# Patient Record
Sex: FEMALE | Race: WHITE | NOT HISPANIC OR LATINO | Employment: FULL TIME | URBAN - METROPOLITAN AREA
[De-identification: names, ages, dates, MRNs, and addresses within clinical notes are randomized per-mention and may not be internally consistent; named-entity substitution may affect disease eponyms.]

---

## 2017-10-31 ENCOUNTER — OFFICE VISIT (OUTPATIENT)
Dept: SLEEP MEDICINE | Facility: HOSPITAL | Age: 57
End: 2017-10-31
Attending: INTERNAL MEDICINE

## 2017-10-31 ENCOUNTER — APPOINTMENT (OUTPATIENT)
Dept: SLEEP MEDICINE | Facility: HOSPITAL | Age: 57
End: 2017-10-31
Attending: INTERNAL MEDICINE

## 2017-10-31 VITALS
SYSTOLIC BLOOD PRESSURE: 123 MMHG | WEIGHT: 215 LBS | BODY MASS INDEX: 32.58 KG/M2 | DIASTOLIC BLOOD PRESSURE: 77 MMHG | HEIGHT: 68 IN | HEART RATE: 76 BPM

## 2017-10-31 DIAGNOSIS — E11.9 TYPE 2 DIABETES MELLITUS WITHOUT COMPLICATION, UNSPECIFIED LONG TERM INSULIN USE STATUS: ICD-10-CM

## 2017-10-31 DIAGNOSIS — K21.9 GASTROESOPHAGEAL REFLUX DISEASE WITHOUT ESOPHAGITIS: ICD-10-CM

## 2017-10-31 DIAGNOSIS — G47.33 OBSTRUCTIVE SLEEP APNEA, ADULT: Primary | ICD-10-CM

## 2017-10-31 PROCEDURE — G0463 HOSPITAL OUTPT CLINIC VISIT: HCPCS

## 2017-10-31 NOTE — PROGRESS NOTES
PULMONARY SLEEP CONSULT NOTE      PATIENT IDENTIFICATION:  Name: Magda Nath  Age: 56 y.o.  Sex: female  :  1960  MRN: [id number]    DATE OF CONSULTATION:  10/31/2017   Referring Physician: No admitting provider for patient encounter.                  CHIEF COMPLAINT: Obstructive Sleep Apnea    History of Present Illness:   Magda Nath is a 56 y.o. female Pt with still multiple wakening up at night with sleepiness fatigue and snoring, witnessed apnea, Hard  to get up in the morning. Daytime fatigue sleepiness loss of energy, Redrock score of (16 )       Review of Systems:   Constitutional: negative   Eyes: negative   ENT/oropharynx: negative   Cardiovascular: negative   Respiratory: negative   Gastrointestinal: negative   Genitourinary: negative   Neurological: negative   Musculoskeletal: negative   Integument/breast: negative   Endocrine: negative   Allergic/Immunologic: negative     Past Medical History:  No past medical history on file.  DM  GERD  Anxiety  Past Surgical History:  No past surgical history on file.     Family History:  No family history on file.     Social History:   Social History   Substance Use Topics   • Smoking status: Not on file   • Smokeless tobacco: Not on file   • Alcohol use Not on file    current smoker     Allergies:  Allergies not on file    Home Meds:    (Not in a hospital admission)    Objective:    Vitals Ranges:   Heart Rate:  [76] 76  BP: (123)/(77) 123/77  Body mass index is 32.69 kg/(m^2).         Exam:    General Appearance:      Head:  Normocephalic, without obvious abnormality, atraumatic.   Eyes:  Conjunctiva/corneas clear, EOM's intact, both eyes.         Ears:  Normal external ear canals, both ears.    Nose:  Nares normal, no drainage      Throat:  Lips, mucosa, and tongue normal. Mallampati score 3    Neck:  Supple, symmetrical, trachea midline. No JVD.  Lungs:   Bilateral basal rhonchi bilaterally, respirations unlabored symmetrical wall movement.     Chest wall:  No tenderness or deformity.    Heart:  Regular rate and rhythm, S1 and S2 normal.  Abdomen: Soft, non-tender, no masses, no organomegaly.    Extremities: Trace edema no clubbing or Cyanosis        Data Review:  All labs (24hrs): No results found for this or any previous visit (from the past 24 hour(s)).     Imaging:  [unfilled]    ASSESSMENT:  Diagnoses and all orders for this visit:    Obstructive sleep apnea, adult    Type 2 diabetes mellitus without complication, unspecified long term insulin use status    Gastroesophageal reflux disease without esophagitis        PLAN:   This is patient with symptoms of obstructive sleep apnea, NPSG study ASAP / split night study, Avoid supine avoid sedative meds in pm, weight loss, Avoid driving. Long discussion with patient about the physiology of YAYO, and long term and short term benefit of treating YAYO   Treating YAYO will improve BP control           Robbi Shi MD. D, ABSM.  10/31/2017  11:54 AM

## 2017-11-03 ENCOUNTER — TRANSCRIBE ORDERS (OUTPATIENT)
Dept: PULMONOLOGY | Facility: HOSPITAL | Age: 57
End: 2017-11-03

## 2017-11-03 ENCOUNTER — TRANSCRIBE ORDERS (OUTPATIENT)
Dept: SLEEP MEDICINE | Facility: HOSPITAL | Age: 57
End: 2017-11-03

## 2017-11-03 DIAGNOSIS — G47.33 OSA (OBSTRUCTIVE SLEEP APNEA): Primary | ICD-10-CM

## 2017-12-17 ENCOUNTER — HOSPITAL ENCOUNTER (OUTPATIENT)
Dept: SLEEP MEDICINE | Facility: HOSPITAL | Age: 57
Discharge: HOME | End: 2017-12-17
Admitting: INTERNAL MEDICINE

## 2017-12-17 DIAGNOSIS — G47.33 OSA (OBSTRUCTIVE SLEEP APNEA): ICD-10-CM

## 2017-12-17 PROCEDURE — 95810 POLYSOM 6/> YRS 4/> PARAM: CPT

## 2018-01-02 ENCOUNTER — TELEPHONE (OUTPATIENT)
Dept: SLEEP MEDICINE | Facility: HOSPITAL | Age: 58
End: 2018-01-02

## 2018-01-02 NOTE — TELEPHONE ENCOUNTER
Tech spoke w/ pt regarding detailed SS results. Pt understands and is agreeable to scheduling titration study per MD. PAP titration scheduled for 2/18/18.

## 2018-02-09 ENCOUNTER — TELEPHONE (OUTPATIENT)
Dept: SLEEP MEDICINE | Facility: HOSPITAL | Age: 58
End: 2018-02-09

## 2018-05-29 ENCOUNTER — APPOINTMENT (OUTPATIENT)
Dept: SLEEP MEDICINE | Facility: HOSPITAL | Age: 58
End: 2018-05-29
Attending: INTERNAL MEDICINE

## 2018-06-12 ENCOUNTER — APPOINTMENT (OUTPATIENT)
Dept: SLEEP MEDICINE | Facility: HOSPITAL | Age: 58
End: 2018-06-12
Attending: INTERNAL MEDICINE

## 2023-03-22 ENCOUNTER — TRANSCRIBE ORDERS (OUTPATIENT)
Dept: ADMINISTRATIVE | Facility: HOSPITAL | Age: 63
End: 2023-03-22
Payer: COMMERCIAL

## 2023-03-22 ENCOUNTER — HOSPITAL ENCOUNTER (OUTPATIENT)
Dept: CARDIOLOGY | Facility: HOSPITAL | Age: 63
Discharge: HOME | End: 2023-03-22
Payer: COMMERCIAL

## 2023-03-22 ENCOUNTER — LAB (OUTPATIENT)
Dept: LAB | Facility: HOSPITAL | Age: 63
End: 2023-03-22
Payer: COMMERCIAL

## 2023-03-22 DIAGNOSIS — Z01.812 PRE-OPERATIVE LABORATORY EXAMINATION: ICD-10-CM

## 2023-03-22 DIAGNOSIS — Z01.812 PRE-OPERATIVE LABORATORY EXAMINATION: Primary | ICD-10-CM

## 2023-03-22 LAB
ANION GAP SERPL CALCULATED.3IONS-SCNC: 7 MMOL/L (ref 5–15)
BUN SERPL-MCNC: 10 MG/DL (ref 8–23)
BUN/CREAT SERPL: 16.9 (ref 7–25)
CALCIUM SPEC-SCNC: 8.9 MG/DL (ref 8.6–10.5)
CHLORIDE SERPL-SCNC: 104 MMOL/L (ref 98–107)
CO2 SERPL-SCNC: 29 MMOL/L (ref 22–29)
CREAT SERPL-MCNC: 0.59 MG/DL (ref 0.57–1)
DEPRECATED RDW RBC AUTO: 40.9 FL (ref 37–54)
EGFRCR SERPLBLD CKD-EPI 2021: 102.7 ML/MIN/1.73
EOSINOPHIL # BLD MANUAL: 0.18 10*3/MM3 (ref 0–0.4)
EOSINOPHIL NFR BLD MANUAL: 2 % (ref 0.3–6.2)
ERYTHROCYTE [DISTWIDTH] IN BLOOD BY AUTOMATED COUNT: 12.5 % (ref 12.3–15.4)
GLUCOSE SERPL-MCNC: 120 MG/DL (ref 65–99)
HBA1C MFR BLD: 8.4 % (ref 4.8–5.6)
HCT VFR BLD AUTO: 52.5 % (ref 34–46.6)
HGB BLD-MCNC: 17.3 G/DL (ref 12–15.9)
LYMPHOCYTES # BLD MANUAL: 2.85 10*3/MM3 (ref 0.7–3.1)
LYMPHOCYTES NFR BLD MANUAL: 6.1 % (ref 5–12)
MCH RBC QN AUTO: 29.5 PG (ref 26.6–33)
MCHC RBC AUTO-ENTMCNC: 33 G/DL (ref 31.5–35.7)
MCV RBC AUTO: 89.6 FL (ref 79–97)
MONOCYTES # BLD: 0.54 10*3/MM3 (ref 0.1–0.9)
NEUTROPHILS # BLD AUTO: 5.26 10*3/MM3 (ref 1.7–7)
NEUTROPHILS NFR BLD MANUAL: 59.6 % (ref 42.7–76)
PLAT MORPH BLD: NORMAL
PLATELET # BLD AUTO: 296 10*3/MM3 (ref 140–450)
PMV BLD AUTO: 10.1 FL (ref 6–12)
POTASSIUM SERPL-SCNC: 4.6 MMOL/L (ref 3.5–5.2)
QT INTERVAL: 416 MS
RBC # BLD AUTO: 5.86 10*6/MM3 (ref 3.77–5.28)
RBC MORPH BLD: NORMAL
SODIUM SERPL-SCNC: 140 MMOL/L (ref 136–145)
VARIANT LYMPHS NFR BLD MANUAL: 32.3 % (ref 19.6–45.3)
WBC MORPH BLD: NORMAL
WBC NRBC COR # BLD: 8.83 10*3/MM3 (ref 3.4–10.8)

## 2023-03-22 PROCEDURE — 83036 HEMOGLOBIN GLYCOSYLATED A1C: CPT

## 2023-03-22 PROCEDURE — 80048 BASIC METABOLIC PNL TOTAL CA: CPT

## 2023-03-22 PROCEDURE — 93005 ELECTROCARDIOGRAM TRACING: CPT | Performed by: ORTHOPAEDIC SURGERY

## 2023-03-22 PROCEDURE — 85007 BL SMEAR W/DIFF WBC COUNT: CPT

## 2023-03-22 PROCEDURE — 36415 COLL VENOUS BLD VENIPUNCTURE: CPT

## 2023-03-22 PROCEDURE — 93010 ELECTROCARDIOGRAM REPORT: CPT | Performed by: INTERNAL MEDICINE

## 2023-03-22 PROCEDURE — 85027 COMPLETE CBC AUTOMATED: CPT

## 2023-10-01 ENCOUNTER — HOSPITAL ENCOUNTER (INPATIENT)
Facility: HOSPITAL | Age: 63
LOS: 4 days | Discharge: HOME | DRG: 190 | End: 2023-10-05
Attending: EMERGENCY MEDICINE | Admitting: FAMILY MEDICINE
Payer: COMMERCIAL

## 2023-10-01 ENCOUNTER — APPOINTMENT (OUTPATIENT)
Dept: CT IMAGING | Facility: HOSPITAL | Age: 63
DRG: 190 | End: 2023-10-01
Payer: COMMERCIAL

## 2023-10-01 ENCOUNTER — APPOINTMENT (OUTPATIENT)
Dept: GENERAL RADIOLOGY | Facility: HOSPITAL | Age: 63
DRG: 190 | End: 2023-10-01
Payer: COMMERCIAL

## 2023-10-01 DIAGNOSIS — J44.1 COPD WITH ACUTE EXACERBATION: Primary | ICD-10-CM

## 2023-10-01 DIAGNOSIS — J96.01 ACUTE HYPOXIC RESPIRATORY FAILURE: ICD-10-CM

## 2023-10-01 DIAGNOSIS — J44.1 COPD EXACERBATION: ICD-10-CM

## 2023-10-01 LAB
ALBUMIN SERPL-MCNC: 3.7 G/DL (ref 3.5–5.2)
ALBUMIN/GLOB SERPL: 1.5 G/DL
ALP SERPL-CCNC: 93 U/L (ref 39–117)
ALT SERPL W P-5'-P-CCNC: 12 U/L (ref 1–33)
ANION GAP SERPL CALCULATED.3IONS-SCNC: 11.5 MMOL/L (ref 5–15)
AST SERPL-CCNC: 12 U/L (ref 1–32)
BASOPHILS # BLD AUTO: 0.06 10*3/MM3 (ref 0–0.2)
BASOPHILS NFR BLD AUTO: 0.7 % (ref 0–1.5)
BILIRUB SERPL-MCNC: 0.5 MG/DL (ref 0–1.2)
BUN SERPL-MCNC: 15 MG/DL (ref 8–23)
BUN/CREAT SERPL: 27.3 (ref 7–25)
CALCIUM SPEC-SCNC: 8.6 MG/DL (ref 8.6–10.5)
CHLORIDE SERPL-SCNC: 101 MMOL/L (ref 98–107)
CO2 SERPL-SCNC: 26.5 MMOL/L (ref 22–29)
CREAT SERPL-MCNC: 0.55 MG/DL (ref 0.57–1)
D DIMER PPP FEU-MCNC: 0.73 MCGFEU/ML (ref 0–0.61)
DEPRECATED RDW RBC AUTO: 48.2 FL (ref 37–54)
EGFRCR SERPLBLD CKD-EPI 2021: 104.4 ML/MIN/1.73
EOSINOPHIL # BLD AUTO: 0.31 10*3/MM3 (ref 0–0.4)
EOSINOPHIL NFR BLD AUTO: 3.6 % (ref 0.3–6.2)
ERYTHROCYTE [DISTWIDTH] IN BLOOD BY AUTOMATED COUNT: 14.6 % (ref 12.3–15.4)
GLOBULIN UR ELPH-MCNC: 2.5 GM/DL
GLUCOSE BLDC GLUCOMTR-MCNC: 247 MG/DL (ref 70–130)
GLUCOSE BLDC GLUCOMTR-MCNC: 337 MG/DL (ref 70–130)
GLUCOSE BLDC GLUCOMTR-MCNC: 340 MG/DL (ref 70–130)
GLUCOSE SERPL-MCNC: 203 MG/DL (ref 65–99)
HCT VFR BLD AUTO: 49.9 % (ref 34–46.6)
HGB BLD-MCNC: 15.8 G/DL (ref 12–15.9)
IMM GRANULOCYTES # BLD AUTO: 0.04 10*3/MM3 (ref 0–0.05)
IMM GRANULOCYTES NFR BLD AUTO: 0.5 % (ref 0–0.5)
LYMPHOCYTES # BLD AUTO: 2.16 10*3/MM3 (ref 0.7–3.1)
LYMPHOCYTES NFR BLD AUTO: 25 % (ref 19.6–45.3)
MCH RBC QN AUTO: 28.5 PG (ref 26.6–33)
MCHC RBC AUTO-ENTMCNC: 31.7 G/DL (ref 31.5–35.7)
MCV RBC AUTO: 89.9 FL (ref 79–97)
MONOCYTES # BLD AUTO: 0.97 10*3/MM3 (ref 0.1–0.9)
MONOCYTES NFR BLD AUTO: 11.2 % (ref 5–12)
NEUTROPHILS NFR BLD AUTO: 5.1 10*3/MM3 (ref 1.7–7)
NEUTROPHILS NFR BLD AUTO: 59 % (ref 42.7–76)
NRBC BLD AUTO-RTO: 0 /100 WBC (ref 0–0.2)
PLATELET # BLD AUTO: 293 10*3/MM3 (ref 140–450)
PMV BLD AUTO: 10 FL (ref 6–12)
POTASSIUM SERPL-SCNC: 4.2 MMOL/L (ref 3.5–5.2)
PROT SERPL-MCNC: 6.2 G/DL (ref 6–8.5)
RBC # BLD AUTO: 5.55 10*6/MM3 (ref 3.77–5.28)
SODIUM SERPL-SCNC: 139 MMOL/L (ref 136–145)
WBC NRBC COR # BLD: 8.64 10*3/MM3 (ref 3.4–10.8)

## 2023-10-01 PROCEDURE — 36415 COLL VENOUS BLD VENIPUNCTURE: CPT

## 2023-10-01 PROCEDURE — 99285 EMERGENCY DEPT VISIT HI MDM: CPT

## 2023-10-01 PROCEDURE — 85025 COMPLETE CBC W/AUTO DIFF WBC: CPT | Performed by: EMERGENCY MEDICINE

## 2023-10-01 PROCEDURE — 94799 UNLISTED PULMONARY SVC/PX: CPT

## 2023-10-01 PROCEDURE — 82948 REAGENT STRIP/BLOOD GLUCOSE: CPT

## 2023-10-01 PROCEDURE — 80053 COMPREHEN METABOLIC PANEL: CPT | Performed by: EMERGENCY MEDICINE

## 2023-10-01 PROCEDURE — 94761 N-INVAS EAR/PLS OXIMETRY MLT: CPT

## 2023-10-01 PROCEDURE — 71275 CT ANGIOGRAPHY CHEST: CPT

## 2023-10-01 PROCEDURE — 94640 AIRWAY INHALATION TREATMENT: CPT

## 2023-10-01 PROCEDURE — 85379 FIBRIN DEGRADATION QUANT: CPT | Performed by: EMERGENCY MEDICINE

## 2023-10-01 PROCEDURE — 25010000002 METHYLPREDNISOLONE PER 125 MG: Performed by: EMERGENCY MEDICINE

## 2023-10-01 PROCEDURE — 25010000002 METHYLPREDNISOLONE PER 40 MG: Performed by: FAMILY MEDICINE

## 2023-10-01 PROCEDURE — 25510000001 IOPAMIDOL PER 1 ML: Performed by: EMERGENCY MEDICINE

## 2023-10-01 PROCEDURE — 63710000001 INSULIN ASPART PER 5 UNITS: Performed by: FAMILY MEDICINE

## 2023-10-01 PROCEDURE — 71045 X-RAY EXAM CHEST 1 VIEW: CPT

## 2023-10-01 PROCEDURE — 63710000001 INSULIN DETEMIR PER 5 UNITS: Performed by: FAMILY MEDICINE

## 2023-10-01 RX ORDER — CYCLOBENZAPRINE HCL 10 MG
10 TABLET ORAL NIGHTLY PRN
COMMUNITY

## 2023-10-01 RX ORDER — FUROSEMIDE 20 MG/1
20 TABLET ORAL DAILY PRN
COMMUNITY

## 2023-10-01 RX ORDER — DEXTROSE MONOHYDRATE 25 G/50ML
10-50 INJECTION, SOLUTION INTRAVENOUS
Status: DISCONTINUED | OUTPATIENT
Start: 2023-10-01 | End: 2023-10-01 | Stop reason: SDUPTHER

## 2023-10-01 RX ORDER — MULTIVIT-MIN/IRON/FOLIC ACID/K 18-600-40
500 CAPSULE ORAL DAILY
COMMUNITY

## 2023-10-01 RX ORDER — ASPIRIN 81 MG/1
81 TABLET ORAL DAILY
COMMUNITY

## 2023-10-01 RX ORDER — VITAMIN B COMPLEX
CAPSULE ORAL DAILY
COMMUNITY

## 2023-10-01 RX ORDER — INSULIN ASPART 100 [IU]/ML
3-14 INJECTION, SOLUTION INTRAVENOUS; SUBCUTANEOUS
Status: DISCONTINUED | OUTPATIENT
Start: 2023-10-01 | End: 2023-10-05 | Stop reason: HOSPADM

## 2023-10-01 RX ORDER — ALBUTEROL SULFATE 2.5 MG/3ML
2.5 SOLUTION RESPIRATORY (INHALATION)
Status: COMPLETED | OUTPATIENT
Start: 2023-10-01 | End: 2023-10-01

## 2023-10-01 RX ORDER — SODIUM CHLORIDE 0.9 % (FLUSH) 0.9 %
10 SYRINGE (ML) INJECTION AS NEEDED
Status: DISCONTINUED | OUTPATIENT
Start: 2023-10-01 | End: 2023-10-05 | Stop reason: HOSPADM

## 2023-10-01 RX ORDER — BENZONATATE 100 MG/1
200 CAPSULE ORAL 3 TIMES DAILY PRN
Status: DISCONTINUED | OUTPATIENT
Start: 2023-10-01 | End: 2023-10-05 | Stop reason: HOSPADM

## 2023-10-01 RX ORDER — GUAIFENESIN 600 MG/1
600 TABLET, EXTENDED RELEASE ORAL EVERY 12 HOURS SCHEDULED
Status: DISCONTINUED | OUTPATIENT
Start: 2023-10-01 | End: 2023-10-05 | Stop reason: HOSPADM

## 2023-10-01 RX ORDER — NICOTINE POLACRILEX 4 MG
15 LOZENGE BUCCAL
Status: DISCONTINUED | OUTPATIENT
Start: 2023-10-01 | End: 2023-10-05 | Stop reason: HOSPADM

## 2023-10-01 RX ORDER — MELATONIN
1000 DAILY
COMMUNITY

## 2023-10-01 RX ORDER — NICOTINE POLACRILEX 4 MG
15 LOZENGE BUCCAL
Status: DISCONTINUED | OUTPATIENT
Start: 2023-10-01 | End: 2023-10-01 | Stop reason: SDUPTHER

## 2023-10-01 RX ORDER — MELOXICAM 15 MG/1
15 TABLET ORAL DAILY
COMMUNITY

## 2023-10-01 RX ORDER — IPRATROPIUM BROMIDE AND ALBUTEROL SULFATE 2.5; .5 MG/3ML; MG/3ML
3 SOLUTION RESPIRATORY (INHALATION) ONCE
Status: COMPLETED | OUTPATIENT
Start: 2023-10-01 | End: 2023-10-01

## 2023-10-01 RX ORDER — ATORVASTATIN CALCIUM 40 MG/1
40 TABLET, FILM COATED ORAL DAILY
COMMUNITY

## 2023-10-01 RX ORDER — ALBUTEROL SULFATE 2.5 MG/3ML
2.5 SOLUTION RESPIRATORY (INHALATION) EVERY 4 HOURS PRN
Status: DISCONTINUED | OUTPATIENT
Start: 2023-10-01 | End: 2023-10-05 | Stop reason: HOSPADM

## 2023-10-01 RX ORDER — LISINOPRIL 20 MG/1
20 TABLET ORAL
Status: DISCONTINUED | OUTPATIENT
Start: 2023-10-01 | End: 2023-10-05 | Stop reason: HOSPADM

## 2023-10-01 RX ORDER — PLECANATIDE 3 MG/1
3 TABLET ORAL DAILY PRN
COMMUNITY

## 2023-10-01 RX ORDER — MELOXICAM 7.5 MG/1
15 TABLET ORAL DAILY
Status: DISCONTINUED | OUTPATIENT
Start: 2023-10-01 | End: 2023-10-05 | Stop reason: HOSPADM

## 2023-10-01 RX ORDER — IPRATROPIUM BROMIDE AND ALBUTEROL SULFATE 2.5; .5 MG/3ML; MG/3ML
3 SOLUTION RESPIRATORY (INHALATION) EVERY 4 HOURS PRN
COMMUNITY

## 2023-10-01 RX ORDER — ESTRADIOL 2 MG/1
2 TABLET ORAL DAILY
COMMUNITY

## 2023-10-01 RX ORDER — LISINOPRIL 10 MG/1
10 TABLET ORAL DAILY
COMMUNITY

## 2023-10-01 RX ORDER — ZOLPIDEM TARTRATE 10 MG/1
10 TABLET ORAL NIGHTLY PRN
COMMUNITY

## 2023-10-01 RX ORDER — DEXTROSE MONOHYDRATE 25 G/50ML
25 INJECTION, SOLUTION INTRAVENOUS
Status: DISCONTINUED | OUTPATIENT
Start: 2023-10-01 | End: 2023-10-05 | Stop reason: HOSPADM

## 2023-10-01 RX ORDER — METHYLPREDNISOLONE SODIUM SUCCINATE 40 MG/ML
40 INJECTION, POWDER, LYOPHILIZED, FOR SOLUTION INTRAMUSCULAR; INTRAVENOUS EVERY 8 HOURS
Status: DISCONTINUED | OUTPATIENT
Start: 2023-10-01 | End: 2023-10-02

## 2023-10-01 RX ORDER — IPRATROPIUM BROMIDE AND ALBUTEROL SULFATE 2.5; .5 MG/3ML; MG/3ML
3 SOLUTION RESPIRATORY (INHALATION)
Status: DISCONTINUED | OUTPATIENT
Start: 2023-10-01 | End: 2023-10-02

## 2023-10-01 RX ORDER — ATORVASTATIN CALCIUM 40 MG/1
40 TABLET, FILM COATED ORAL NIGHTLY
Status: DISCONTINUED | OUTPATIENT
Start: 2023-10-01 | End: 2023-10-02

## 2023-10-01 RX ORDER — METHYLPREDNISOLONE SODIUM SUCCINATE 125 MG/2ML
125 INJECTION, POWDER, LYOPHILIZED, FOR SOLUTION INTRAMUSCULAR; INTRAVENOUS ONCE
Status: COMPLETED | OUTPATIENT
Start: 2023-10-01 | End: 2023-10-01

## 2023-10-01 RX ORDER — LEVOCETIRIZINE DIHYDROCHLORIDE 5 MG/1
5 TABLET, FILM COATED ORAL EVERY EVENING
COMMUNITY

## 2023-10-01 RX ORDER — FERROUS SULFATE 325(65) MG
325 TABLET ORAL 2 TIMES DAILY WITH MEALS
COMMUNITY

## 2023-10-01 RX ORDER — BUSPIRONE HYDROCHLORIDE 5 MG/1
5 TABLET ORAL 3 TIMES DAILY PRN
COMMUNITY

## 2023-10-01 RX ORDER — ENOXAPARIN SODIUM 100 MG/ML
40 INJECTION SUBCUTANEOUS EVERY 24 HOURS
Status: DISCONTINUED | OUTPATIENT
Start: 2023-10-02 | End: 2023-10-05 | Stop reason: HOSPADM

## 2023-10-01 RX ORDER — INSULIN ASPART 100 [IU]/ML
3-14 INJECTION, SOLUTION INTRAVENOUS; SUBCUTANEOUS ONCE
Status: COMPLETED | OUTPATIENT
Start: 2023-10-01 | End: 2023-10-01

## 2023-10-01 RX ADMIN — IOPAMIDOL 100 ML: 755 INJECTION, SOLUTION INTRAVENOUS at 09:00

## 2023-10-01 RX ADMIN — METHYLPREDNISOLONE SODIUM SUCCINATE 40 MG: 40 INJECTION, POWDER, FOR SOLUTION INTRAMUSCULAR; INTRAVENOUS at 11:40

## 2023-10-01 RX ADMIN — INSULIN DETEMIR 20 UNITS: 100 INJECTION, SOLUTION SUBCUTANEOUS at 21:24

## 2023-10-01 RX ADMIN — GUAIFENESIN 600 MG: 600 TABLET, EXTENDED RELEASE ORAL at 11:40

## 2023-10-01 RX ADMIN — INSULIN ASPART 10 UNITS: 100 INJECTION, SOLUTION INTRAVENOUS; SUBCUTANEOUS at 21:25

## 2023-10-01 RX ADMIN — METHYLPREDNISOLONE SODIUM SUCCINATE 40 MG: 40 INJECTION, POWDER, FOR SOLUTION INTRAMUSCULAR; INTRAVENOUS at 18:45

## 2023-10-01 RX ADMIN — ALBUTEROL SULFATE 2.5 MG: 2.5 SOLUTION RESPIRATORY (INHALATION) at 07:37

## 2023-10-01 RX ADMIN — IPRATROPIUM BROMIDE AND ALBUTEROL SULFATE 3 ML: .5; 3 SOLUTION RESPIRATORY (INHALATION) at 19:19

## 2023-10-01 RX ADMIN — LISINOPRIL 20 MG: 20 TABLET ORAL at 13:39

## 2023-10-01 RX ADMIN — ALBUTEROL SULFATE 2.5 MG: 2.5 SOLUTION RESPIRATORY (INHALATION) at 07:17

## 2023-10-01 RX ADMIN — Medication 10 ML: at 20:09

## 2023-10-01 RX ADMIN — INSULIN ASPART 10 UNITS: 100 INJECTION, SOLUTION INTRAVENOUS; SUBCUTANEOUS at 17:42

## 2023-10-01 RX ADMIN — IPRATROPIUM BROMIDE AND ALBUTEROL SULFATE 3 ML: .5; 3 SOLUTION RESPIRATORY (INHALATION) at 07:11

## 2023-10-01 RX ADMIN — METHYLPREDNISOLONE SODIUM SUCCINATE 125 MG: 125 INJECTION, POWDER, FOR SOLUTION INTRAMUSCULAR; INTRAVENOUS at 07:18

## 2023-10-01 RX ADMIN — BENZONATATE 200 MG: 100 CAPSULE ORAL at 20:09

## 2023-10-01 RX ADMIN — MELOXICAM 15 MG: 7.5 TABLET ORAL at 13:39

## 2023-10-01 RX ADMIN — INSULIN ASPART 5 UNITS: 100 INJECTION, SOLUTION INTRAVENOUS; SUBCUTANEOUS at 13:38

## 2023-10-01 RX ADMIN — IPRATROPIUM BROMIDE AND ALBUTEROL SULFATE 3 ML: .5; 3 SOLUTION RESPIRATORY (INHALATION) at 14:20

## 2023-10-01 RX ADMIN — ATORVASTATIN CALCIUM 40 MG: 40 TABLET, FILM COATED ORAL at 20:09

## 2023-10-01 RX ADMIN — INSULIN DETEMIR 60 UNITS: 100 INJECTION, SOLUTION SUBCUTANEOUS at 13:38

## 2023-10-01 RX ADMIN — GUAIFENESIN 600 MG: 600 TABLET, EXTENDED RELEASE ORAL at 20:09

## 2023-10-01 NOTE — Clinical Note
Level of Care: Telemetry [5]   Diagnosis: COPD exacerbation [942936]   Admitting Physician: MICHAELLE CALDERON [[id number]]   Bed Request Comments: COPD exacerbation with COVID 8 days ago.

## 2023-10-01 NOTE — PLAN OF CARE
Goal Outcome Evaluation:  Plan of Care Reviewed With: patient           Outcome Evaluation: Vss, Admitted from er. O2 at 2 liters. Receiving iv solumedrol. Placed on telemetry.

## 2023-10-01 NOTE — H&P
"[facility] HOSPITALIST     PCP: Provider, No Known    CODE status: Full     CHIEF COMPLAINT: SOB; low oxygen sats    HISTORY OF PRESENT ILLNESS:    63 y/o female with hx of DM II, hyperlipidemia, and recently diagnosed HTN, as well as tobacco abuse and likely COPD, presents to [facility] ED for persistent SOB.  Patient was diagnosed with COVID-19 about 10 days ago.  She was seen in immediate care and prescribed oral steroids, breathing treatments, Paxlovid, Symbicort, and doxycycline.  She started to feel a little better, and returned to work 5 days later.  Over the last few days, she has had more wheezing and a more productive cough with \"white foamy sputum.\"  She feels her lungs and chest getting tight, especially with exertion.  She has a pulse ox at home and has been noticing her sats drop to the 70s at night and mid to low 80s during the day.  It improves to high 80s after a breathing treatment, then drops back to low 80s.  She had fevers for the first few days of her illness, but none since.  She continues to smoke a few cigarettes before and after work.        Past Medical History:   Diagnosis Date    Diabetes mellitus      Past Surgical History:   Procedure Laterality Date    JOINT REPLACEMENT       History reviewed. No pertinent family history.  Social History     Tobacco Use    Smoking status: Every Day     Types: Cigarettes   Substance Use Topics    Alcohol use: Never    Drug use: Never     (Not in a hospital admission)    Allergies:  Macrobid [nitrofurantoin] and Erythromycin    Immunization History   Administered Date(s) Administered    COVID-19 (MODERNA) 1st,2nd,3rd Dose Monovalent 02/25/2021, 03/25/2021, 12/04/2021       REVIEW OF SYSTEMS:  Please see the above history of present illness for pertinent positives and negatives.  The remainder of the patient's systems have been reviewed and are negative.      Vital Signs  Temp:  [98.4 °F (36.9 °C)] 98.4 °F (36.9 °C)  Heart Rate:  " [90-99] 92  Resp:  [20-22] 20  BP: (153)/(81) 153/81         Physical Exam:  General: sitting up in chair; on oxygen; increased work of breathing  HENT: Head is atraumatic, normocephalic. Hearing is grossly intact  Eyes: Vision is grossly intact. Pupils appear equal and round.   Cardiovascular: RRR, S1-S2; no murmurs  Respiratory: Diffuse bilateral wheezing with diminished breath sounds at bases  Abdominal/GI: Soft, nontender, bowel sounds present. No rebound or guarding present.   Extremities: No peripheral edema noted.   Musculoskeletal: 5/5 MS Ues and Les   Skin: Warm and dry.   Psych: Mood and affect are appropriate. Cooperative with exam.   Neuro: No facial asymmetry noted. No focal deficits noted, hearing and vision are grossly intact.    Emotional Behavior: all of the following were found to be normal   Judgment and Insight   Mental Status   Memory   Mood and Affect: neither of the following found acutely        Depression                 Anxiety     Debilities: no signs of the following found    Physical Weakness     Handicaps     Disabilities     Agitation      Results Review:    I reviewed the patient's new clinical results.  Lab Results (most recent)       Procedure Component Value Units Date/Time    Comprehensive Metabolic Panel [[id number]]  (Abnormal) Collected: 10/01/23 0720    Specimen: Blood from Arm, Right Updated: 10/01/23 0744     Glucose 203 mg/dL      BUN 15 mg/dL      Creatinine 0.55 mg/dL      Sodium 139 mmol/L      Potassium 4.2 mmol/L      Chloride 101 mmol/L      CO2 26.5 mmol/L      Calcium 8.6 mg/dL      Total Protein 6.2 g/dL      Albumin 3.7 g/dL      ALT (SGPT) 12 U/L      AST (SGOT) 12 U/L      Alkaline Phosphatase 93 U/L      Total Bilirubin 0.5 mg/dL      Globulin 2.5 gm/dL      A/G Ratio 1.5 g/dL      BUN/Creatinine Ratio 27.3     Anion Gap 11.5 mmol/L      eGFR 104.4 mL/min/1.73     Narrative:      GFR Normal >60  Chronic Kidney Disease <60  Kidney Failure <15      D-dimer,  "Quantitative [[id number]]  (Abnormal) Collected: 10/01/23 0720    Specimen: Blood from Arm, Right Updated: 10/01/23 0740     D-Dimer, Quantitative 0.73 MCGFEU/mL     Narrative:      According to the assay 's published package insert, a normal (<0.50 MCGFEU/mL) D-dimer result in conjunction with a non-high clinical probability assessment, excludes deep vein thrombosis (DVT) and pulmonary embolism (PE) with high sensitivity.    D-dimer values increase with age and this can make VTE exclusion of an older population difficult. To address this, the [employer], based on best available evidence and recent guidelines, recommends that clinicians use age-adjusted D-dimer thresholds in patients greater than 50 years of age with: a) a low probability of PE who do not meet all Pulmonary Embolism Rule Out Criteria, or b) in those with intermediate probability of PE.   The formula for an age-adjusted D-dimer cut-off is \"age/100\".  For example, a 60 year old patient would have an age-adjusted cut-off of 0.60 MCGFEU/mL and an 80 year old 0.80 MCGFEU/mL.    CBC & Differential [[id number]]  (Abnormal) Collected: 10/01/23 0720    Specimen: Blood from Arm, Right Updated: 10/01/23 0727    Narrative:      The following orders were created for panel order CBC & Differential.  Procedure                               Abnormality         Status                     ---------                               -----------         ------                     CBC Auto Differential[[id number]]        Abnormal            Final result                 Please view results for these tests on the individual orders.    CBC Auto Differential [[id number]]  (Abnormal) Collected: 10/01/23 0720    Specimen: Blood from Arm, Right Updated: 10/01/23 0727     WBC 8.64 10*3/mm3      RBC 5.55 10*6/mm3      Hemoglobin 15.8 g/dL      Hematocrit 49.9 %      MCV 89.9 fL      MCH 28.5 pg      MCHC 31.7 g/dL      RDW 14.6 %      RDW-SD 48.2 fl      " MPV 10.0 fL      Platelets 293 10*3/mm3      Neutrophil % 59.0 %      Lymphocyte % 25.0 %      Monocyte % 11.2 %      Eosinophil % 3.6 %      Basophil % 0.7 %      Immature Grans % 0.5 %      Neutrophils, Absolute 5.10 10*3/mm3      Lymphocytes, Absolute 2.16 10*3/mm3      Monocytes, Absolute 0.97 10*3/mm3      Eosinophils, Absolute 0.31 10*3/mm3      Basophils, Absolute 0.06 10*3/mm3      Immature Grans, Absolute 0.04 10*3/mm3      nRBC 0.0 /100 WBC             Imaging Results (Most Recent)       Procedure Component Value Units Date/Time    CT Angiogram Chest [[id number]] Collected: 10/01/23 0919     Updated: 10/01/23 0926    Narrative:      CT ANGIOGRAM CHEST-10/1/2023     INDICATION:  Worsening shortness of air and cough. COVID diagnosis 8 days ago.     TECHNIQUE:  CT angiogram of the chest with IV contrast. 3-D reconstructions were  obtained and reviewed.   Radiation dose reduction techniques included  automated exposure control or exposure modulation based on body size.  Count of known CT and cardiac nuc med studies performed in previous 12  months: 0.     COMPARISON:  None available.     FINDINGS:  Adequate opacification of the pulmonary arteries with no filling  defects. Thoracic aorta normal in course and caliber without dissection.  Heart size is normal. No pericardial effusion.     No pleural effusion. No pneumothorax. No focal pulmonary infiltrates. 5  mm noncalcified pulmonary nodule in the left lower lobe (image 87).     Visualized upper abdomen is unremarkable.     No acute osseous abnormality.       Impression:      1. Negative for pulmonary embolus.  2. Negative for thoracic aortic aneurysm/dissection.  3. No acute pulmonary process.  4. 5 mm noncalcified pulmonary nodule in the left lower lobe. Management  recommendation: Based on current published guidelines, uncomplicated  pulmonary nodules less than 6 mm do not require CT follow-up. In high  risk patients, follow-up CT in 12 months is optional.  ([employer] guidelines, 2017).     This report was finalized on 10/1/2023 9:24 AM by Dr. Kevin Mendez MD.       XR Chest 1 View [[id number]] Collected: 10/01/23 0805     Updated: 10/01/23 0808    Narrative:      XR CHEST 1 VW-, 10/1/2023     HISTORY:  Shortness of breath. COVID diagnosis 1 week ago.     COMPARISON:  *  None available     FINDINGS:  Single frontal view(s) of the chest. The lungs are clear. No pleural  effusions. No pneumothorax. Heart size and mediastinal contours are  within normal limits. Pulmonary vasculature is unremarkable. No acute  osseous abnormalities.          Impression:      No acute cardiopulmonary findings.     This report was finalized on 10/1/2023 8:05 AM by Dr. Kevin Mendez MD.                 ECG/EMG Results (most recent)       None          reviewed    Assessment & Plan     Acute hypoxic respiratory failure  Secondary to acute COPD exacerbation likely triggered by COVID-19  I personally reviewed CXR and CTA chest showing no acute findings or infiltrates  Failed outpatient therapy with oral steroids, inhalers, and doxycycline  Admit and start IV steroids q 8 hours with scheduled and prn Duonebs  Mucinex and tessalon prn  IS to bedside; wean oxygen as tolerated  Considered Azithromycin, but patient with allergy to erythromycin years ago, so hold off for now     Pulmonary nodule  Noted on CTA chest  Needs outpatient follow-up in 6-12 months     DM II uncontrolled with hyperglycemia  On basaglar 60 U daily at home  Continue this daily and add 20 U dose nightly while on steroids  Accuchecks with SSI  Check hgb A1c in AM     HTN  Recently diagnosed following surgery   Continue lisinopril daily     Hyperlipidemia  Continue statin    Chronic back pain   Continue daily mobic    DVT ppx: Lovenox/ambulation           Chronic stable conditions to be managed with home medications include the following conditions listed below. Also please note: Every effort was made to accurately  obtain patient's home medications. This was done utilizing extensive chart review, ED documentation, recent pharmacy records, and where possible thorough discussion with the patient if medications were known by the patient. I have reviewed and edited the patient's home medications as per my efforts above and current med list can be found within home medications portion of this electronic medical record and is accurate as possible as of 10/01/23             I discussed the patient's findings and my recommendations with patient at bedside.      Michael Sandoval, DO  10/01/23  10:37 EDT      At [facility], we believe that sharing information builds trust and better relationships. You are receiving this note because you recently visited [facility]. It is possible you will see health information before a provider has talked with you about it. This kind of information can be easy to misunderstand. To help you fully understand what it means for your health, we urge you to discuss this note with your provider.

## 2023-10-01 NOTE — ED PROVIDER NOTES
"Subjective     History provided by:  Patient  History of Present Illness    Chief complaint: Shortness of breath and cough.    Location: Lungs    Quality/Severity: The patient states she is severely short of breath.  She has a cough that is productive of foamy sputum.  She has had postnasal drip and runny nose.  She has been wheezing.    Timing/Onset: Started 8 days ago.    Modifying Factors: Patient was diagnosed with COVID 8 days ago.  She was treated initially with Paxlovid, Doxy, and steroids.  She is using her Symbicort and albuterol inhalers without improvement.    Associated symptoms: Denies chest pain or fever.    Narrative: The patient is a 62-year-old white female who developed a cough, postnasal drip and congestion and shortness of breath 8 days ago.  She was diagnosed with COVID 9/21/2023 and treated with Paxlovid, doxycycline and steroids.  Her symptoms persist.  She had a bad coughing paroxysm last night.  Her Symbicort and albuterol inhalers are not not helping.  She has a history of COPD and continues to smoke a pack per day.    PLAN:bs  /64 (BP Location: Left arm, Patient Position: Lying)   Pulse 95   Temp 98.2 °F (36.8 °C) (Oral)   Resp 20   Ht 177.8 cm (70\")   Wt 95.2 kg (209 lb 14.1 oz)   LMP  (Exact Date)   SpO2 (!) 89%   BMI 30.11 kg/m²   Review of Systems    .edla  Past Medical History:   Diagnosis Date    Diabetes mellitus        Allergies   Allergen Reactions    Macrobid [Nitrofurantoin] Headache    Erythromycin Rash       Past Surgical History:   Procedure Laterality Date    JOINT REPLACEMENT         History reviewed. No pertinent family history.    Social History     Socioeconomic History    Marital status:    Tobacco Use    Smoking status: Every Day     Packs/day: 1.00     Types: Cigarettes   Vaping Use    Vaping Use: Never used   Substance and Sexual Activity    Alcohol use: Never    Drug use: Never           Objective   Physical Exam  Vitals and nursing note " reviewed.   Constitutional:       General: She is not in acute distress.     Appearance: She is well-developed. She is not toxic-appearing or diaphoretic.      Comments: The patient appears mild respiratory distress.  She does not appear toxic.  Review of her vital signs: She is afebrile with a temperature 98.4, respirations are slightly elevated 20 with a room air oxygen saturation this diminished at 89%, slightly tachycardic with heart rate of 99, blood pressure slightly elevated 153/81.   HENT:      Head: Normocephalic and atraumatic.      Nose: Nose normal.      Mouth/Throat:      Mouth: Mucous membranes are moist.      Pharynx: Oropharynx is clear. No pharyngeal swelling or oropharyngeal exudate.   Eyes:      General: No scleral icterus.        Right eye: No discharge.         Left eye: No discharge.      Pupils: Pupils are equal, round, and reactive to light.   Neck:      Thyroid: No thyromegaly.      Vascular: No JVD.   Cardiovascular:      Rate and Rhythm: Normal rate and regular rhythm.      Heart sounds: Normal heart sounds. No murmur heard.  Pulmonary:      Effort: Pulmonary effort is normal.      Breath sounds: Normal breath sounds. Examination of the right-upper field reveals wheezing. Examination of the left-upper field reveals wheezing. Examination of the right-middle field reveals wheezing. Examination of the left-middle field reveals wheezing. Examination of the right-lower field reveals wheezing. Examination of the left-lower field reveals wheezing. Wheezing (Inspiratory and expiratory) present. No decreased breath sounds or rales.   Chest:      Chest wall: No tenderness.   Abdominal:      General: Bowel sounds are normal. There is no distension.      Palpations: Abdomen is soft.      Tenderness: There is no abdominal tenderness.   Musculoskeletal:         General: No tenderness or deformity. Normal range of motion.      Cervical back: Normal range of motion and neck supple.      Right lower leg:  No edema.      Left lower leg: No edema.   Lymphadenopathy:      Cervical: No cervical adenopathy.   Skin:     General: Skin is warm and dry.      Capillary Refill: Capillary refill takes less than 2 seconds.      Findings: No erythema or rash.   Neurological:      General: No focal deficit present.      Mental Status: She is alert and oriented to person, place, and time.      Cranial Nerves: No cranial nerve deficit.      Coordination: Coordination normal.      Comments: No focal motor sensory deficit   Psychiatric:         Mood and Affect: Mood normal.         Behavior: Behavior normal.         Thought Content: Thought content normal.         Judgment: Judgment normal.       Procedures           ED Course  ED Course as of 10/01/23 1545   Sun Oct 01, 2023   0932 Review the patient's laboratory studies: The patient's CMP has an elevated glucose of 203, otherwise normal electrolytes, normal renal function test and normal liver enzymes.  D-dimer slightly elevated 0.73.  CBC is normal white count 8.6 with a normal differential.  Hemoglobin, hematocrit are essentially within normal limits. [TP]   0933 The patient's chest x-ray was interpreted by me and the radiologist as no acute disease. [TP]   0933 CT angiogram was obtained due to the patient having low oxygen saturations, slightly elevated D-dimer, and recently having COVID.  The CT angiogram was negative for pulmonary embolus and negative for acute pulmonary process.  A 5 mm noncalcified left lower lobe pulmonary nodule was noted. [TP]   0934 The patient has wheezing and appears to be having a COPD exacerbation.  She was administered Solu-Medrol 125 mg IV.  She was administered a DuoNeb followed by 2 albuterol nebulizer treatments. [TP]   0934 Repeat examination at 09: 30 after above treatments the patient still has expiratory wheezes.  On a liter of oxygen nasal cannula the patient has an oxygen saturation of 90%.  I increased her O2 to 2 L. [TP]   0937 09:37  patient discussed with Dr. Sandoval, hospitalist, who agrees to admit the patient. [TP]      ED Course User Index  [TP] Juwan Munoz MD                                           Medical Decision Making  My differential diagnosis for dyspnea includes but is not limited to:  Asthma, COPD, pneumonia, pulmonary embolus, acute respiratory distress syndrome, pneumothorax, pleural effusion, pulmonary fibrosis, congestive heart failure, myocardial infarction, DKA, uremia, acidosis, sepsis, anemia, drug related, hyperventilation, CNS disease     Problems Addressed:  COPD with acute exacerbation: complicated acute illness or injury    Amount and/or Complexity of Data Reviewed  Labs: ordered. Decision-making details documented in ED Course.  Radiology: ordered and independent interpretation performed. Decision-making details documented in ED Course.  Discussion of management or test interpretation with external provider(s): Details documented in the ED course.    Risk  Prescription drug management.  Decision regarding hospitalization.      Labs Reviewed   COMPREHENSIVE METABOLIC PANEL - Abnormal; Notable for the following components:       Result Value    Glucose 203 (*)     Creatinine 0.55 (*)     BUN/Creatinine Ratio 27.3 (*)     All other components within normal limits    Narrative:     GFR Normal >60  Chronic Kidney Disease <60  Kidney Failure <15     D-DIMER, QUANTITATIVE - Abnormal; Notable for the following components:    D-Dimer, Quantitative 0.73 (*)     All other components within normal limits    Narrative:     According to the assay 's published package insert, a normal (<0.50 MCGFEU/mL) D-dimer result in conjunction with a non-high clinical probability assessment, excludes deep vein thrombosis (DVT) and pulmonary embolism (PE) with high sensitivity.    D-dimer values increase with age and this can make VTE exclusion of an older population difficult. To address this, the [employer] of  "Physicians, based on best available evidence and recent guidelines, recommends that clinicians use age-adjusted D-dimer thresholds in patients greater than 50 years of age with: a) a low probability of PE who do not meet all Pulmonary Embolism Rule Out Criteria, or b) in those with intermediate probability of PE.   The formula for an age-adjusted D-dimer cut-off is \"age/100\".  For example, a 60 year old patient would have an age-adjusted cut-off of 0.60 MCGFEU/mL and an 80 year old 0.80 MCGFEU/mL.   CBC WITH AUTO DIFFERENTIAL - Abnormal; Notable for the following components:    RBC 5.55 (*)     Hematocrit 49.9 (*)     Monocytes, Absolute 0.97 (*)     All other components within normal limits   POCT GLUCOSE FINGERSTICK - Abnormal; Notable for the following components:    Glucose 247 (*)     All other components within normal limits   POCT GLUCOSE FINGERSTICK   POCT GLUCOSE FINGERSTICK   POCT GLUCOSE FINGERSTICK   CBC AND DIFFERENTIAL    Narrative:     The following orders were created for panel order CBC & Differential.  Procedure                               Abnormality         Status                     ---------                               -----------         ------                     CBC Auto Differential[[id number]]        Abnormal            Final result                 Please view results for these tests on the individual orders.   Rishi fry       Medication List      No changes were made to your prescriptions during this visit.             Final diagnoses:   COPD with acute exacerbation       ED Disposition  ED Disposition       ED Disposition   Decision to Admit    Condition   --    Comment   Level of Care: Telemetry [5]   Diagnosis: Acute hypoxic respiratory failure [[id number]]   Admitting Physician: MICHAELLE CALDERON [[id number]]   Certification: I Certify That Inpatient Hospital Services Are Medically Necessary For Greater Than 2 Midnights                 No follow-up provider specified.       Medication List "      No changes were made to your prescriptions during this visit.            Juwan Munoz MD  10/01/23 9361

## 2023-10-01 NOTE — NURSING NOTE
..Nursing report ED to floor  Magda Nath  62 y.o.  female    HPI :   Chief Complaint   Patient presents with    Shortness of Breath       Admitting doctor:   Michael Sadnoval DO    Admitting diagnosis:   The encounter diagnosis was COPD with acute exacerbation.    Code status:   Current Code Status       Date Active Code Status Order ID Comments User Context       Not on file            Allergies:   Macrobid [nitrofurantoin] and Erythromycin    Isolation:   No active isolations    Intake and Output  No intake or output data in the 24 hours ending 10/01/23 1034    Weight:   There were no vitals filed for this visit.    Most recent vitals:   Vitals:    10/01/23 0745 10/01/23 0800 10/01/23 0900 10/01/23 1000   BP:       BP Location:       Patient Position:       Pulse: 95 92 91 92   Resp:       Temp:       TempSrc:       SpO2: (!) 89% 92% 93%        Active LDAs/IV Access:   Lines, Drains & Airways       Active LDAs       Name Placement date Placement time Site Days    Peripheral IV 10/01/23 0658 Right Antecubital 10/01/23  0658  Antecubital  less than 1                    Labs (abnormal labs have a star):   Labs Reviewed   COMPREHENSIVE METABOLIC PANEL - Abnormal; Notable for the following components:       Result Value    Glucose 203 (*)     Creatinine 0.55 (*)     BUN/Creatinine Ratio 27.3 (*)     All other components within normal limits    Narrative:     GFR Normal >60  Chronic Kidney Disease <60  Kidney Failure <15     D-DIMER, QUANTITATIVE - Abnormal; Notable for the following components:    D-Dimer, Quantitative 0.73 (*)     All other components within normal limits    Narrative:     According to the assay 's published package insert, a normal (<0.50 MCGFEU/mL) D-dimer result in conjunction with a non-high clinical probability assessment, excludes deep vein thrombosis (DVT) and pulmonary embolism (PE) with high sensitivity.    D-dimer values increase with age and this can make VTE exclusion of an  "older population difficult. To address this, the [employer], based on best available evidence and recent guidelines, recommends that clinicians use age-adjusted D-dimer thresholds in patients greater than 50 years of age with: a) a low probability of PE who do not meet all Pulmonary Embolism Rule Out Criteria, or b) in those with intermediate probability of PE.   The formula for an age-adjusted D-dimer cut-off is \"age/100\".  For example, a 60 year old patient would have an age-adjusted cut-off of 0.60 MCGFEU/mL and an 80 year old 0.80 MCGFEU/mL.   CBC WITH AUTO DIFFERENTIAL - Abnormal; Notable for the following components:    RBC 5.55 (*)     Hematocrit 49.9 (*)     Monocytes, Absolute 0.97 (*)     All other components within normal limits   CBC AND DIFFERENTIAL    Narrative:     The following orders were created for panel order CBC & Differential.  Procedure                               Abnormality         Status                     ---------                               -----------         ------                     CBC Auto Differential[[id number]]        Abnormal            Final result                 Please view results for these tests on the individual orders.       EKG:   No orders to display       Meds given in ED:   Medications   sodium chloride 0.9 % flush 10 mL (has no administration in time range)   albuterol (PROVENTIL) nebulizer solution 0.083% 2.5 mg/3mL (has no administration in time range)   ipratropium-albuterol (DUO-NEB) nebulizer solution 3 mL (has no administration in time range)   methylPREDNISolone sodium succinate (SOLU-Medrol) injection 40 mg (has no administration in time range)   benzonatate (TESSALON) capsule 200 mg (has no administration in time range)   guaiFENesin (MUCINEX) 12 hr tablet 600 mg (has no administration in time range)   methylPREDNISolone sodium succinate (SOLU-Medrol) injection 125 mg (125 mg Intravenous Given 10/1/23 0718)   ipratropium-albuterol " (DUO-NEB) nebulizer solution 3 mL (3 mL Nebulization Given 10/1/23 0711)   albuterol (PROVENTIL) nebulizer solution 0.083% 2.5 mg/3mL (2.5 mg Nebulization Given 10/1/23 0737)   iopamidol (ISOVUE-370) 76 % injection 100 mL (100 mL Intravenous Given 10/1/23 0900)       Imaging results:  XR Chest 1 View    Result Date: 10/1/2023  No acute cardiopulmonary findings.  This report was finalized on 10/1/2023 8:05 AM by Dr. Kevin Mendez MD.      CT Angiogram Chest    Result Date: 10/1/2023  1. Negative for pulmonary embolus. 2. Negative for thoracic aortic aneurysm/dissection. 3. No acute pulmonary process. 4. 5 mm noncalcified pulmonary nodule in the left lower lobe. Management recommendation: Based on current published guidelines, uncomplicated pulmonary nodules less than 6 mm do not require CT follow-up. In high risk patients, follow-up CT in 12 months is optional. (Fleischner Society guidelines, 2017).  This report was finalized on 10/1/2023 9:24 AM by Dr. Kevin Mendez MD.       Ambulatory status:   -ambulatory    Social issues:   Social History     Socioeconomic History    Marital status:    Tobacco Use    Smoking status: Every Day     Types: Cigarettes   Substance and Sexual Activity    Alcohol use: Never    Drug use: Never       NIH Stroke Scale:         Sarkis Joshua RN  10/01/23 10:34 EDT

## 2023-10-02 LAB
ANION GAP SERPL CALCULATED.3IONS-SCNC: 9.1 MMOL/L (ref 5–15)
B PARAPERT DNA SPEC QL NAA+PROBE: NOT DETECTED
B PERT DNA SPEC QL NAA+PROBE: NOT DETECTED
BASOPHILS # BLD AUTO: 0.03 10*3/MM3 (ref 0–0.2)
BASOPHILS NFR BLD AUTO: 0.3 % (ref 0–1.5)
BUN SERPL-MCNC: 17 MG/DL (ref 8–23)
BUN/CREAT SERPL: 34.7 (ref 7–25)
C PNEUM DNA NPH QL NAA+NON-PROBE: NOT DETECTED
CALCIUM SPEC-SCNC: 8.7 MG/DL (ref 8.6–10.5)
CHLORIDE SERPL-SCNC: 104 MMOL/L (ref 98–107)
CO2 SERPL-SCNC: 26.9 MMOL/L (ref 22–29)
CREAT SERPL-MCNC: 0.49 MG/DL (ref 0.57–1)
DEPRECATED RDW RBC AUTO: 46.2 FL (ref 37–54)
EGFRCR SERPLBLD CKD-EPI 2021: 107.4 ML/MIN/1.73
EOSINOPHIL # BLD AUTO: 0 10*3/MM3 (ref 0–0.4)
EOSINOPHIL NFR BLD AUTO: 0 % (ref 0.3–6.2)
ERYTHROCYTE [DISTWIDTH] IN BLOOD BY AUTOMATED COUNT: 14.4 % (ref 12.3–15.4)
FLUAV SUBTYP SPEC NAA+PROBE: NOT DETECTED
FLUBV RNA ISLT QL NAA+PROBE: NOT DETECTED
GLUCOSE BLDC GLUCOMTR-MCNC: 225 MG/DL (ref 70–130)
GLUCOSE BLDC GLUCOMTR-MCNC: 243 MG/DL (ref 70–130)
GLUCOSE SERPL-MCNC: 266 MG/DL (ref 65–99)
HADV DNA SPEC NAA+PROBE: NOT DETECTED
HBA1C MFR BLD: 7.5 % (ref 4.8–5.6)
HCOV 229E RNA SPEC QL NAA+PROBE: NOT DETECTED
HCOV HKU1 RNA SPEC QL NAA+PROBE: NOT DETECTED
HCOV NL63 RNA SPEC QL NAA+PROBE: NOT DETECTED
HCOV OC43 RNA SPEC QL NAA+PROBE: NOT DETECTED
HCT VFR BLD AUTO: 46.4 % (ref 34–46.6)
HGB BLD-MCNC: 14.9 G/DL (ref 12–15.9)
HMPV RNA NPH QL NAA+NON-PROBE: NOT DETECTED
HPIV1 RNA ISLT QL NAA+PROBE: NOT DETECTED
HPIV2 RNA SPEC QL NAA+PROBE: NOT DETECTED
HPIV3 RNA NPH QL NAA+PROBE: NOT DETECTED
HPIV4 P GENE NPH QL NAA+PROBE: NOT DETECTED
IMM GRANULOCYTES # BLD AUTO: 0.11 10*3/MM3 (ref 0–0.05)
IMM GRANULOCYTES NFR BLD AUTO: 0.9 % (ref 0–0.5)
LYMPHOCYTES # BLD AUTO: 1.31 10*3/MM3 (ref 0.7–3.1)
LYMPHOCYTES NFR BLD AUTO: 11.1 % (ref 19.6–45.3)
M PNEUMO IGG SER IA-ACNC: NOT DETECTED
MCH RBC QN AUTO: 28.6 PG (ref 26.6–33)
MCHC RBC AUTO-ENTMCNC: 32.1 G/DL (ref 31.5–35.7)
MCV RBC AUTO: 89.1 FL (ref 79–97)
MONOCYTES # BLD AUTO: 0.81 10*3/MM3 (ref 0.1–0.9)
MONOCYTES NFR BLD AUTO: 6.9 % (ref 5–12)
NEUTROPHILS NFR BLD AUTO: 80.8 % (ref 42.7–76)
NEUTROPHILS NFR BLD AUTO: 9.53 10*3/MM3 (ref 1.7–7)
NRBC BLD AUTO-RTO: 0 /100 WBC (ref 0–0.2)
PLATELET # BLD AUTO: 268 10*3/MM3 (ref 140–450)
PMV BLD AUTO: 10.1 FL (ref 6–12)
POTASSIUM SERPL-SCNC: 4.7 MMOL/L (ref 3.5–5.2)
RBC # BLD AUTO: 5.21 10*6/MM3 (ref 3.77–5.28)
RHINOVIRUS RNA SPEC NAA+PROBE: DETECTED
RSV RNA NPH QL NAA+NON-PROBE: NOT DETECTED
SARS-COV-2 RNA NPH QL NAA+NON-PROBE: NOT DETECTED
SODIUM SERPL-SCNC: 140 MMOL/L (ref 136–145)
WBC NRBC COR # BLD: 11.79 10*3/MM3 (ref 3.4–10.8)

## 2023-10-02 PROCEDURE — 25010000002 METHYLPREDNISOLONE PER 40 MG: Performed by: FAMILY MEDICINE

## 2023-10-02 PROCEDURE — 94799 UNLISTED PULMONARY SVC/PX: CPT

## 2023-10-02 PROCEDURE — 83036 HEMOGLOBIN GLYCOSYLATED A1C: CPT | Performed by: FAMILY MEDICINE

## 2023-10-02 PROCEDURE — 25010000002 METHYLPREDNISOLONE PER 125 MG: Performed by: HOSPITALIST

## 2023-10-02 PROCEDURE — 63710000001 INSULIN ASPART PER 5 UNITS: Performed by: FAMILY MEDICINE

## 2023-10-02 PROCEDURE — 25010000002 ENOXAPARIN PER 10 MG: Performed by: FAMILY MEDICINE

## 2023-10-02 PROCEDURE — 82948 REAGENT STRIP/BLOOD GLUCOSE: CPT

## 2023-10-02 PROCEDURE — 94760 N-INVAS EAR/PLS OXIMETRY 1: CPT

## 2023-10-02 PROCEDURE — 0202U NFCT DS 22 TRGT SARS-COV-2: CPT | Performed by: HOSPITALIST

## 2023-10-02 PROCEDURE — 94664 DEMO&/EVAL PT USE INHALER: CPT

## 2023-10-02 PROCEDURE — 63710000001 INSULIN DETEMIR PER 5 UNITS: Performed by: FAMILY MEDICINE

## 2023-10-02 PROCEDURE — 94761 N-INVAS EAR/PLS OXIMETRY MLT: CPT

## 2023-10-02 PROCEDURE — 85025 COMPLETE CBC W/AUTO DIFF WBC: CPT | Performed by: FAMILY MEDICINE

## 2023-10-02 PROCEDURE — 80048 BASIC METABOLIC PNL TOTAL CA: CPT | Performed by: FAMILY MEDICINE

## 2023-10-02 RX ORDER — MELATONIN
1000 DAILY
Status: DISCONTINUED | OUTPATIENT
Start: 2023-10-02 | End: 2023-10-05 | Stop reason: HOSPADM

## 2023-10-02 RX ORDER — CETIRIZINE HYDROCHLORIDE 10 MG/1
10 TABLET ORAL DAILY
Status: DISCONTINUED | OUTPATIENT
Start: 2023-10-02 | End: 2023-10-05 | Stop reason: HOSPADM

## 2023-10-02 RX ORDER — FERROUS SULFATE TAB EC 324 MG (65 MG FE EQUIVALENT) 324 (65 FE) MG
324 TABLET DELAYED RESPONSE ORAL 2 TIMES DAILY WITH MEALS
Status: DISCONTINUED | OUTPATIENT
Start: 2023-10-02 | End: 2023-10-05 | Stop reason: HOSPADM

## 2023-10-02 RX ORDER — BUSPIRONE HYDROCHLORIDE 5 MG/1
5 TABLET ORAL 3 TIMES DAILY PRN
Status: DISCONTINUED | OUTPATIENT
Start: 2023-10-02 | End: 2023-10-05 | Stop reason: HOSPADM

## 2023-10-02 RX ORDER — ASCORBIC ACID 500 MG
500 TABLET ORAL DAILY
Status: DISCONTINUED | OUTPATIENT
Start: 2023-10-02 | End: 2023-10-05 | Stop reason: HOSPADM

## 2023-10-02 RX ORDER — CYCLOBENZAPRINE HCL 10 MG
10 TABLET ORAL NIGHTLY PRN
Status: DISCONTINUED | OUTPATIENT
Start: 2023-10-02 | End: 2023-10-05 | Stop reason: HOSPADM

## 2023-10-02 RX ORDER — NICOTINE POLACRILEX 4 MG
15 LOZENGE BUCCAL
Status: DISCONTINUED | OUTPATIENT
Start: 2023-10-02 | End: 2023-10-05 | Stop reason: HOSPADM

## 2023-10-02 RX ORDER — ATORVASTATIN CALCIUM 40 MG/1
40 TABLET, FILM COATED ORAL DAILY
Status: DISCONTINUED | OUTPATIENT
Start: 2023-10-02 | End: 2023-10-05 | Stop reason: HOSPADM

## 2023-10-02 RX ORDER — METHYLPREDNISOLONE SODIUM SUCCINATE 125 MG/2ML
60 INJECTION, POWDER, LYOPHILIZED, FOR SOLUTION INTRAMUSCULAR; INTRAVENOUS EVERY 6 HOURS
Status: COMPLETED | OUTPATIENT
Start: 2023-10-02 | End: 2023-10-03

## 2023-10-02 RX ORDER — IPRATROPIUM BROMIDE AND ALBUTEROL SULFATE 2.5; .5 MG/3ML; MG/3ML
3 SOLUTION RESPIRATORY (INHALATION)
Status: DISCONTINUED | OUTPATIENT
Start: 2023-10-03 | End: 2023-10-05 | Stop reason: HOSPADM

## 2023-10-02 RX ORDER — FOLIC ACID/VIT B COMPLEX AND C 0.8 MG
1 TABLET ORAL DAILY
Status: DISCONTINUED | OUTPATIENT
Start: 2023-10-02 | End: 2023-10-05 | Stop reason: HOSPADM

## 2023-10-02 RX ORDER — ESTRADIOL 1 MG/1
2 TABLET ORAL DAILY
Status: DISCONTINUED | OUTPATIENT
Start: 2023-10-02 | End: 2023-10-05 | Stop reason: HOSPADM

## 2023-10-02 RX ORDER — ASPIRIN 81 MG/1
81 TABLET ORAL DAILY
Status: DISCONTINUED | OUTPATIENT
Start: 2023-10-02 | End: 2023-10-05 | Stop reason: HOSPADM

## 2023-10-02 RX ORDER — DEXTROSE MONOHYDRATE 25 G/50ML
25 INJECTION, SOLUTION INTRAVENOUS
Status: DISCONTINUED | OUTPATIENT
Start: 2023-10-02 | End: 2023-10-05 | Stop reason: HOSPADM

## 2023-10-02 RX ORDER — IPRATROPIUM BROMIDE AND ALBUTEROL SULFATE 2.5; .5 MG/3ML; MG/3ML
3 SOLUTION RESPIRATORY (INHALATION) EVERY 4 HOURS PRN
Status: DISCONTINUED | OUTPATIENT
Start: 2023-10-02 | End: 2023-10-05 | Stop reason: HOSPADM

## 2023-10-02 RX ADMIN — BUSPIRONE HYDROCHLORIDE 5 MG: 5 TABLET ORAL at 13:21

## 2023-10-02 RX ADMIN — Medication 1 TABLET: at 13:08

## 2023-10-02 RX ADMIN — INSULIN DETEMIR 20 UNITS: 100 INJECTION, SOLUTION SUBCUTANEOUS at 21:20

## 2023-10-02 RX ADMIN — MELOXICAM 15 MG: 7.5 TABLET ORAL at 09:54

## 2023-10-02 RX ADMIN — ATORVASTATIN CALCIUM 40 MG: 40 TABLET, FILM COATED ORAL at 13:08

## 2023-10-02 RX ADMIN — METHYLPREDNISOLONE SODIUM SUCCINATE 60 MG: 125 INJECTION, POWDER, FOR SOLUTION INTRAMUSCULAR; INTRAVENOUS at 09:55

## 2023-10-02 RX ADMIN — ASPIRIN 81 MG: 81 TABLET, COATED ORAL at 13:08

## 2023-10-02 RX ADMIN — METHYLPREDNISOLONE SODIUM SUCCINATE 40 MG: 40 INJECTION, POWDER, FOR SOLUTION INTRAMUSCULAR; INTRAVENOUS at 02:53

## 2023-10-02 RX ADMIN — IPRATROPIUM BROMIDE AND ALBUTEROL SULFATE 3 ML: .5; 3 SOLUTION RESPIRATORY (INHALATION) at 19:18

## 2023-10-02 RX ADMIN — GUAIFENESIN 600 MG: 600 TABLET, EXTENDED RELEASE ORAL at 21:20

## 2023-10-02 RX ADMIN — OXYCODONE HYDROCHLORIDE AND ACETAMINOPHEN 500 MG: 500 TABLET ORAL at 13:08

## 2023-10-02 RX ADMIN — INSULIN ASPART 5 UNITS: 100 INJECTION, SOLUTION INTRAVENOUS; SUBCUTANEOUS at 13:08

## 2023-10-02 RX ADMIN — ESTRADIOL 2 MG: 1 TABLET ORAL at 13:08

## 2023-10-02 RX ADMIN — CETIRIZINE HYDROCHLORIDE 10 MG: 10 TABLET, FILM COATED ORAL at 13:08

## 2023-10-02 RX ADMIN — BENZONATATE 200 MG: 100 CAPSULE ORAL at 09:54

## 2023-10-02 RX ADMIN — IPRATROPIUM BROMIDE AND ALBUTEROL SULFATE 3 ML: .5; 3 SOLUTION RESPIRATORY (INHALATION) at 13:26

## 2023-10-02 RX ADMIN — ENOXAPARIN SODIUM 40 MG: 40 INJECTION SUBCUTANEOUS at 09:55

## 2023-10-02 RX ADMIN — FERROUS SULFATE TAB EC 324 MG (65 MG FE EQUIVALENT) 324 MG: 324 (65 FE) TABLET DELAYED RESPONSE at 17:45

## 2023-10-02 RX ADMIN — IPRATROPIUM BROMIDE AND ALBUTEROL SULFATE 3 ML: .5; 3 SOLUTION RESPIRATORY (INHALATION) at 00:36

## 2023-10-02 RX ADMIN — INSULIN ASPART 12 UNITS: 100 INJECTION, SOLUTION INTRAVENOUS; SUBCUTANEOUS at 21:20

## 2023-10-02 RX ADMIN — INSULIN ASPART 10 UNITS: 100 INJECTION, SOLUTION INTRAVENOUS; SUBCUTANEOUS at 17:38

## 2023-10-02 RX ADMIN — CHOLECALCIFEROL TAB 25 MCG (1000 UNIT) 1000 UNITS: 25 TAB at 13:08

## 2023-10-02 RX ADMIN — GUAIFENESIN 600 MG: 600 TABLET, EXTENDED RELEASE ORAL at 09:55

## 2023-10-02 RX ADMIN — METHYLPREDNISOLONE SODIUM SUCCINATE 60 MG: 125 INJECTION, POWDER, FOR SOLUTION INTRAMUSCULAR; INTRAVENOUS at 17:14

## 2023-10-02 RX ADMIN — IPRATROPIUM BROMIDE AND ALBUTEROL SULFATE 3 ML: .5; 3 SOLUTION RESPIRATORY (INHALATION) at 07:11

## 2023-10-02 RX ADMIN — INSULIN ASPART 5 UNITS: 100 INJECTION, SOLUTION INTRAVENOUS; SUBCUTANEOUS at 08:00

## 2023-10-02 RX ADMIN — LISINOPRIL 20 MG: 20 TABLET ORAL at 09:55

## 2023-10-02 RX ADMIN — BENZONATATE 200 MG: 100 CAPSULE ORAL at 21:20

## 2023-10-02 RX ADMIN — METHYLPREDNISOLONE SODIUM SUCCINATE 60 MG: 125 INJECTION, POWDER, FOR SOLUTION INTRAMUSCULAR; INTRAVENOUS at 21:20

## 2023-10-02 NOTE — PLAN OF CARE
"Goal Outcome Evaluation:  Plan of Care Reviewed With: patient        Progress: improving  Outcome Evaluation: Patient ambulated in room several times. VSS and titrated patient between 1-2L throughout shift. Home medications were added to treatment plan. Patient reported \"feeling better\" this evening.         "

## 2023-10-02 NOTE — PAYOR COMM NOTE
"Magda Garcia (62 y.o. Female)     [address] [address]  Facility NPI: [id number]    [facility]  Fax: [phone]  Phone: [phone] ([place]: 1053)  Subject: ADMISSION NOTIFICATION AUTH CLINICALS  Reference #: [id number]  Please don't hesitate to contact me with any questions or concerns.            Date of Birth   1960    Social Security Number       Address   [address] [place], [place] [address]    Home Phone   [phone]    MRN   [id number]       Buddhist   Unknown    Marital Status                               Admission Date   10/1/23    Admission Type   Emergency    Admitting Provider   Michael Sandoval DO    Attending Provider   Michael Sandoval DO    Department, Room/Bed   [facility] MED SURG, 1414/1       Discharge Date       Discharge Disposition       Discharge Destination                                 Attending Provider: Michael Sandoval DO    Allergies: Macrobid [Nitrofurantoin], Erythromycin    Isolation: Enh Drop/Con   Infection: COVID (rule out) (10/02/23)   Code Status: CPR    Ht: 177.8 cm (70\")   Wt: 95.2 kg (209 lb 14.1 oz)    Admission Cmt: None   Principal Problem: COPD exacerbation [J44.1]                   Active Insurance as of 10/1/2023       Primary Coverage       Payor Plan Insurance Group Employer/Plan Group    ANTHEM BLUE CROSS ANTHEM BLUE CROSS BLUE [employer]IELD PPO [id number]       Payor Plan Address Payor Plan Phone Number Payor Plan Fax Number Effective Dates    [address] [phone]  1/1/2014 - None Entered    [place] [address]         Subscriber Name Subscriber Birth Date Member ID       MAGDA GARCIA 1960 [id number]                     Emergency Contacts        (Rel.) Home Phone Work Phone Mobile Phone    Alexey Garcia (Spouse) [phone] -- --              Insurance Information                  ANTHEM BLUE CROSS/ANTHEM BLUE CROSS BLUE SHIELD PPO Phone: " "[phone]    Subscriber: Magda Nath Subscriber#: [id number]    Group#: [id number] Precert#: --          Problem List             Codes Noted - Resolved       Hospital    * (Principal) COPD exacerbation ICD-10-CM: J44.1  ICD-9-CM: 491.21 10/1/2023 - Present    Acute hypoxic respiratory failure ICD-10-CM: J96.01  ICD-9-CM: 518.81 10/1/2023 - Present       Non-Hospital    Gastroesophageal reflux disease without esophagitis ICD-10-CM: K21.9  ICD-9-CM: 530.81 10/31/2017 - Present    Type 2 diabetes mellitus without complication ICD-10-CM: E11.9  ICD-9-CM: 250.00 10/31/2017 - Present    Obstructive sleep apnea, adult ICD-10-CM: G47.33  ICD-9-CM: 327.23 10/31/2017 - Present        History & Physical        Michael Sandoval DO at 10/01/23 1036          [facility] HOSPITALIST     PCP: Provider, No Known    CODE status: Full     CHIEF COMPLAINT: SOB; low oxygen sats    HISTORY OF PRESENT ILLNESS:    61 y/o female with hx of DM II, hyperlipidemia, and recently diagnosed HTN, as well as tobacco abuse and likely COPD, presents to [facility] ED for persistent SOB.  Patient was diagnosed with COVID-19 about 10 days ago.  She was seen in immediate care and prescribed oral steroids, breathing treatments, Paxlovid, Symbicort, and doxycycline.  She started to feel a little better, and returned to work 5 days later.  Over the last few days, she has had more wheezing and a more productive cough with \"white foamy sputum.\"  She feels her lungs and chest getting tight, especially with exertion.  She has a pulse ox at home and has been noticing her sats drop to the 70s at night and mid to low 80s during the day.  It improves to high 80s after a breathing treatment, then drops back to low 80s.  She had fevers for the first few days of her illness, but none since.  She continues to smoke a few cigarettes before and after work.        Past Medical History:   Diagnosis Date    Diabetes mellitus      Past " Surgical History:   Procedure Laterality Date    JOINT REPLACEMENT       History reviewed. No pertinent family history.  Social History     Tobacco Use    Smoking status: Every Day     Types: Cigarettes   Substance Use Topics    Alcohol use: Never    Drug use: Never     (Not in a hospital admission)    Allergies:  Macrobid [nitrofurantoin] and Erythromycin    Immunization History   Administered Date(s) Administered    COVID-19 (MODERNA) 1st,2nd,3rd Dose Monovalent 02/25/2021, 03/25/2021, 12/04/2021       REVIEW OF SYSTEMS:  Please see the above history of present illness for pertinent positives and negatives.  The remainder of the patient's systems have been reviewed and are negative.      Vital Signs  Temp:  [98.4 °F (36.9 °C)] 98.4 °F (36.9 °C)  Heart Rate:  [90-99] 92  Resp:  [20-22] 20  BP: (153)/(81) 153/81         Physical Exam:  General: sitting up in chair; on oxygen; increased work of breathing  HENT: Head is atraumatic, normocephalic. Hearing is grossly intact  Eyes: Vision is grossly intact. Pupils appear equal and round.   Cardiovascular: RRR, S1-S2; no murmurs  Respiratory: Diffuse bilateral wheezing with diminished breath sounds at bases  Abdominal/GI: Soft, nontender, bowel sounds present. No rebound or guarding present.   Extremities: No peripheral edema noted.   Musculoskeletal: 5/5 MS Ues and Les   Skin: Warm and dry.   Psych: Mood and affect are appropriate. Cooperative with exam.   Neuro: No facial asymmetry noted. No focal deficits noted, hearing and vision are grossly intact.    Emotional Behavior: all of the following were found to be normal   Judgment and Insight   Mental Status   Memory   Mood and Affect: neither of the following found acutely        Depression                 Anxiety     Debilities: no signs of the following found    Physical Weakness     Handicaps     Disabilities     Agitation      Results Review:    I reviewed the patient's new clinical results.  Lab Results (most  "recent)       Procedure Component Value Units Date/Time    Comprehensive Metabolic Panel [[id number]]  (Abnormal) Collected: 10/01/23 0720    Specimen: Blood from Arm, Right Updated: 10/01/23 0744     Glucose 203 mg/dL      BUN 15 mg/dL      Creatinine 0.55 mg/dL      Sodium 139 mmol/L      Potassium 4.2 mmol/L      Chloride 101 mmol/L      CO2 26.5 mmol/L      Calcium 8.6 mg/dL      Total Protein 6.2 g/dL      Albumin 3.7 g/dL      ALT (SGPT) 12 U/L      AST (SGOT) 12 U/L      Alkaline Phosphatase 93 U/L      Total Bilirubin 0.5 mg/dL      Globulin 2.5 gm/dL      A/G Ratio 1.5 g/dL      BUN/Creatinine Ratio 27.3     Anion Gap 11.5 mmol/L      eGFR 104.4 mL/min/1.73     Narrative:      GFR Normal >60  Chronic Kidney Disease <60  Kidney Failure <15      D-dimer, Quantitative [[id number]]  (Abnormal) Collected: 10/01/23 0720    Specimen: Blood from Arm, Right Updated: 10/01/23 0740     D-Dimer, Quantitative 0.73 MCGFEU/mL     Narrative:      According to the assay 's published package insert, a normal (<0.50 MCGFEU/mL) D-dimer result in conjunction with a non-high clinical probability assessment, excludes deep vein thrombosis (DVT) and pulmonary embolism (PE) with high sensitivity.    D-dimer values increase with age and this can make VTE exclusion of an older population difficult. To address this, the American College of Physicians, based on best available evidence and recent guidelines, recommends that clinicians use age-adjusted D-dimer thresholds in patients greater than 50 years of age with: a) a low probability of PE who do not meet all Pulmonary Embolism Rule Out Criteria, or b) in those with intermediate probability of PE.   The formula for an age-adjusted D-dimer cut-off is \"age/100\".  For example, a 60 year old patient would have an age-adjusted cut-off of 0.60 MCGFEU/mL and an 80 year old 0.80 MCGFEU/mL.    CBC & Differential [[id number]]  (Abnormal) Collected: 10/01/23 0720    Specimen: Blood from " Arm, Right Updated: 10/01/23 0727    Narrative:      The following orders were created for panel order CBC & Differential.  Procedure                               Abnormality         Status                     ---------                               -----------         ------                     CBC Auto Differential[[id number]]        Abnormal            Final result                 Please view results for these tests on the individual orders.    CBC Auto Differential [[id number]]  (Abnormal) Collected: 10/01/23 0720    Specimen: Blood from Arm, Right Updated: 10/01/23 0727     WBC 8.64 10*3/mm3      RBC 5.55 10*6/mm3      Hemoglobin 15.8 g/dL      Hematocrit 49.9 %      MCV 89.9 fL      MCH 28.5 pg      MCHC 31.7 g/dL      RDW 14.6 %      RDW-SD 48.2 fl      MPV 10.0 fL      Platelets 293 10*3/mm3      Neutrophil % 59.0 %      Lymphocyte % 25.0 %      Monocyte % 11.2 %      Eosinophil % 3.6 %      Basophil % 0.7 %      Immature Grans % 0.5 %      Neutrophils, Absolute 5.10 10*3/mm3      Lymphocytes, Absolute 2.16 10*3/mm3      Monocytes, Absolute 0.97 10*3/mm3      Eosinophils, Absolute 0.31 10*3/mm3      Basophils, Absolute 0.06 10*3/mm3      Immature Grans, Absolute 0.04 10*3/mm3      nRBC 0.0 /100 WBC             Imaging Results (Most Recent)       Procedure Component Value Units Date/Time    CT Angiogram Chest [[id number]] Collected: 10/01/23 0919     Updated: 10/01/23 0926    Narrative:      CT ANGIOGRAM CHEST-10/1/2023     INDICATION:  Worsening shortness of air and cough. COVID diagnosis 8 days ago.     TECHNIQUE:  CT angiogram of the chest with IV contrast. 3-D reconstructions were  obtained and reviewed.   Radiation dose reduction techniques included  automated exposure control or exposure modulation based on body size.  Count of known CT and cardiac nuc med studies performed in previous 12  months: 0.     COMPARISON:  None available.     FINDINGS:  Adequate opacification of the pulmonary arteries with no  filling  defects. Thoracic aorta normal in course and caliber without dissection.  Heart size is normal. No pericardial effusion.     No pleural effusion. No pneumothorax. No focal pulmonary infiltrates. 5  mm noncalcified pulmonary nodule in the left lower lobe (image 87).     Visualized upper abdomen is unremarkable.     No acute osseous abnormality.       Impression:      1. Negative for pulmonary embolus.  2. Negative for thoracic aortic aneurysm/dissection.  3. No acute pulmonary process.  4. 5 mm noncalcified pulmonary nodule in the left lower lobe. Management  recommendation: Based on current published guidelines, uncomplicated  pulmonary nodules less than 6 mm do not require CT follow-up. In high  risk patients, follow-up CT in 12 months is optional. (Fleisch[employer]  [employer] guidelines, 2017).     This report was finalized on 10/1/2023 9:24 AM by Dr. Kevin Mendez MD.       XR Chest 1 View [[id number]] Collected: 10/01/23 0805     Updated: 10/01/23 0808    Narrative:      XR CHEST 1 VW-, 10/1/2023     HISTORY:  Shortness of breath. COVID diagnosis 1 week ago.     COMPARISON:  *  None available     FINDINGS:  Single frontal view(s) of the chest. The lungs are clear. No pleural  effusions. No pneumothorax. Heart size and mediastinal contours are  within normal limits. Pulmonary vasculature is unremarkable. No acute  osseous abnormalities.          Impression:      No acute cardiopulmonary findings.     This report was finalized on 10/1/2023 8:05 AM by Dr. Kevin Mendez MD.                 ECG/EMG Results (most recent)       None          reviewed    Assessment & Plan     Acute hypoxic respiratory failure  Secondary to acute COPD exacerbation likely triggered by COVID-19  I personally reviewed CXR and CTA chest showing no acute findings or infiltrates  Failed outpatient therapy with oral steroids, inhalers, and doxycycline  Admit and start IV steroids q 8 hours with scheduled and prn Duonebs  Mucinex and tessalon  prn  IS to bedside; wean oxygen as tolerated  Considered Azithromycin, but patient with allergy to erythromycin years ago, so hold off for now     Pulmonary nodule  Noted on CTA chest  Needs outpatient follow-up in 6-12 months     DM II uncontrolled with hyperglycemia  On basaglar 60 U daily at home  Continue this daily and add 20 U dose nightly while on steroids  Accuchecks with SSI  Check hgb A1c in AM     HTN  Recently diagnosed following surgery   Continue lisinopril daily     Hyperlipidemia  Continue statin    Chronic back pain   Continue daily mobic    DVT ppx: Lovenox/ambulation           Chronic stable conditions to be managed with home medications include the following conditions listed below. Also please note: Every effort was made to accurately obtain patient's home medications. This was done utilizing extensive chart review, ED documentation, recent pharmacy records, and where possible thorough discussion with the patient if medications were known by the patient. I have reviewed and edited the patient's home medications as per my efforts above and current med list can be found within home medications portion of this electronic medical record and is accurate as possible as of 10/01/23             I discussed the patient's findings and my recommendations with patient at bedside.      Michael Sandoval DO  10/01/23  10:37 EDT      At [facility], we believe that sharing information builds trust and better relationships. You are receiving this note because you recently visited [facility]. It is possible you will see health information before a provider has talked with you about it. This kind of information can be easy to misunderstand. To help you fully understand what it means for your health, we urge you to discuss this note with your provider.      Electronically signed by Michael Sandoval DO at 10/01/23 1120       Facility-Administered Medications as of 10/2/2023   Medication Dose Route Frequency  Provider Last Rate Last Admin    [COMPLETED] albuterol (PROVENTIL) nebulizer solution 0.083% 2.5 mg/3mL  2.5 mg Nebulization Q15 Min Juwan Munoz MD   2.5 mg at 10/01/23 0737    albuterol (PROVENTIL) nebulizer solution 0.083% 2.5 mg/3mL  2.5 mg Nebulization Q4H PRN Michael Sandoval DO        ascorbic acid (VITAMIN C) tablet 500 mg  500 mg Oral Daily Agustín Villarreal MD   500 mg at 10/02/23 1308    aspirin EC tablet 81 mg  81 mg Oral Daily Agustín Villarreal MD   81 mg at 10/02/23 1308    atorvastatin (LIPITOR) tablet 40 mg  40 mg Oral Daily Agustín Villarreal MD   40 mg at 10/02/23 1308    b complex-vitamin c-folic acid (NEPHRO-AMIE) tablet 1 tablet  1 tablet Oral Daily Agustín Villarreal MD   1 tablet at 10/02/23 1308    benzonatate (TESSALON) capsule 200 mg  200 mg Oral TID PRN Michael Sandoval DO   200 mg at 10/02/23 0954    busPIRone (BUSPAR) tablet 5 mg  5 mg Oral TID PRN Agustín Villarreal MD        cetirizine (zyrTEC) tablet 10 mg  10 mg Oral Daily Agustín Villarreal MD   10 mg at 10/02/23 1308    cholecalciferol (VITAMIN D3) tablet 1,000 Units  1,000 Units Oral Daily Agustín Villarreal MD   1,000 Units at 10/02/23 1308    cyclobenzaprine (FLEXERIL) tablet 10 mg  10 mg Oral Nightly PRN Agustín Villarreal MD        dextrose (D50W) (25 g/50 mL) IV injection 25 g  25 g Intravenous Q15 Min PRN Michael Sandoval DO        dextrose (GLUTOSE) oral gel 15 g  15 g Oral Q15 Min PRN Michael Sandoval DO        Enoxaparin Sodium (LOVENOX) syringe 40 mg  40 mg Subcutaneous Q24H Michael Sandoval DO   40 mg at 10/02/23 0955    estradiol (ESTRACE) tablet 2 mg  2 mg Oral Daily Agustín Villarreal MD   2 mg at 10/02/23 1308    ferrous sulfate EC tablet 324 mg  324 mg Oral BID With Meals Agustín Villarreal MD        guaiFENesin (MUCINEX) 12 hr tablet 600 mg  600 mg Oral Q12H Michael Sandoval DO   600 mg at 10/02/23 0955    Insulin Aspart (novoLOG) injection 3-14 Units  3-14 Units Subcutaneous 4x Daily AC & at Bedtime Michael Sandoval DO   5 Units at 10/02/23  0800    [COMPLETED] Insulin Aspart (novoLOG) injection 3-14 Units  3-14 Units Subcutaneous Once Michael Sandoval DO   5 Units at 10/01/23 1338    insulin detemir (LEVEMIR) injection 20 Units  20 Units Subcutaneous Nightly Michael Sandoval DO   20 Units at 10/01/23 2124    [START ON 10/3/2023] insulin detemir (LEVEMIR) injection 70 Units  70 Units Subcutaneous Daily Agustín Villarreal MD        [COMPLETED] iopamidol (ISOVUE-370) 76 % injection 100 mL  100 mL Intravenous Once in imaging Juwan Munoz MD   100 mL at 10/01/23 0900    [COMPLETED] ipratropium-albuterol (DUO-NEB) nebulizer solution 3 mL  3 mL Nebulization Once Juwan Munoz MD   3 mL at 10/01/23 0711    ipratropium-albuterol (DUO-NEB) nebulizer solution 3 mL  3 mL Nebulization Q6H - RT Michael Sandoval DO   3 mL at 10/02/23 0711    lisinopril (PRINIVIL,ZESTRIL) tablet 20 mg  20 mg Oral Q24H Michael Sandoval DO   20 mg at 10/02/23 0955    meloxicam (MOBIC) tablet 15 mg  15 mg Oral Daily Michael Sandoval DO   15 mg at 10/02/23 0954    [COMPLETED] methylPREDNISolone sodium succinate (SOLU-Medrol) injection 125 mg  125 mg Intravenous Once Juwan Munoz MD   125 mg at 10/01/23 0718    methylPREDNISolone sodium succinate (SOLU-Medrol) injection 60 mg  60 mg Intravenous Q6H Agustín Villarreal MD   60 mg at 10/02/23 0955    sodium chloride 0.9 % flush 10 mL  10 mL Intravenous PRN Juwan Munoz MD   10 mL at 10/01/23 2009     Lab Results (last 72 hours)       Procedure Component Value Units Date/Time    POC Glucose Once [[id number]]  (Abnormal) Collected: 10/02/23 1219    Specimen: Blood Updated: 10/02/23 1224     Glucose 225 mg/dL     Respiratory Panel PCR w/COVID-19(SARS-CoV-2) HARPER/WILIAM/DINORA/PAD/COR/MAD/JASON In-House, NP Swab in UT/VTM, 3-4 HR TAT - Swab, Nasopharynx [[id number]] Collected: 10/02/23 1037    Specimen: Swab from Nasopharynx Updated: 10/02/23 1049    POC Glucose Once [[id number]]  (Abnormal) Collected: 10/02/23 0757    Specimen: Blood Updated: 10/02/23  0803     Glucose 243 mg/dL     Hemoglobin A1c [[id number]]  (Abnormal) Collected: 10/02/23 0353    Specimen: Blood Updated: 10/02/23 0450     Hemoglobin A1C 7.50 %     Narrative:      Hemoglobin A1C Ranges:    Increased Risk for Diabetes  5.7% to 6.4%  Diabetes                     >= 6.5%  Diabetic Goal                < 7.0%    Basic Metabolic Panel [[id number]]  (Abnormal) Collected: 10/02/23 0353    Specimen: Blood Updated: 10/02/23 0438     Glucose 266 mg/dL      BUN 17 mg/dL      Creatinine 0.49 mg/dL      Sodium 140 mmol/L      Potassium 4.7 mmol/L      Chloride 104 mmol/L      CO2 26.9 mmol/L      Calcium 8.7 mg/dL      BUN/Creatinine Ratio 34.7     Anion Gap 9.1 mmol/L      eGFR 107.4 mL/min/1.73     Narrative:      GFR Normal >60  Chronic Kidney Disease <60  Kidney Failure <15      CBC Auto Differential [[id number]]  (Abnormal) Collected: 10/02/23 0353    Specimen: Blood Updated: 10/02/23 0416     WBC 11.79 10*3/mm3      RBC 5.21 10*6/mm3      Hemoglobin 14.9 g/dL      Hematocrit 46.4 %      MCV 89.1 fL      MCH 28.6 pg      MCHC 32.1 g/dL      RDW 14.4 %      RDW-SD 46.2 fl      MPV 10.1 fL      Platelets 268 10*3/mm3      Neutrophil % 80.8 %      Lymphocyte % 11.1 %      Monocyte % 6.9 %      Eosinophil % 0.0 %      Basophil % 0.3 %      Immature Grans % 0.9 %      Neutrophils, Absolute 9.53 10*3/mm3      Lymphocytes, Absolute 1.31 10*3/mm3      Monocytes, Absolute 0.81 10*3/mm3      Eosinophils, Absolute 0.00 10*3/mm3      Basophils, Absolute 0.03 10*3/mm3      Immature Grans, Absolute 0.11 10*3/mm3      nRBC 0.0 /100 WBC     POC Glucose Once [[id number]]  (Abnormal) Collected: 10/01/23 2117    Specimen: Blood Updated: 10/01/23 2125     Glucose 337 mg/dL     POC Glucose Once [[id number]]  (Abnormal) Collected: 10/01/23 1709    Specimen: Blood Updated: 10/01/23 1717     Glucose 340 mg/dL     POC Glucose Once [[id number]]  (Abnormal) Collected: 10/01/23 1220    Specimen: Blood Updated: 10/01/23 1236     Glucose  "247 mg/dL     Comprehensive Metabolic Panel [[id number]]  (Abnormal) Collected: 10/01/23 0720    Specimen: Blood from Arm, Right Updated: 10/01/23 0744     Glucose 203 mg/dL      BUN 15 mg/dL      Creatinine 0.55 mg/dL      Sodium 139 mmol/L      Potassium 4.2 mmol/L      Chloride 101 mmol/L      CO2 26.5 mmol/L      Calcium 8.6 mg/dL      Total Protein 6.2 g/dL      Albumin 3.7 g/dL      ALT (SGPT) 12 U/L      AST (SGOT) 12 U/L      Alkaline Phosphatase 93 U/L      Total Bilirubin 0.5 mg/dL      Globulin 2.5 gm/dL      A/G Ratio 1.5 g/dL      BUN/Creatinine Ratio 27.3     Anion Gap 11.5 mmol/L      eGFR 104.4 mL/min/1.73     Narrative:      GFR Normal >60  Chronic Kidney Disease <60  Kidney Failure <15      D-dimer, Quantitative [[id number]]  (Abnormal) Collected: 10/01/23 0720    Specimen: Blood from Arm, Right Updated: 10/01/23 0740     D-Dimer, Quantitative 0.73 MCGFEU/mL     Narrative:      According to the assay 's published package insert, a normal (<0.50 MCGFEU/mL) D-dimer result in conjunction with a non-high clinical probability assessment, excludes deep vein thrombosis (DVT) and pulmonary embolism (PE) with high sensitivity.    D-dimer values increase with age and this can make VTE exclusion of an older population difficult. To address this, the [employer], based on best available evidence and recent guidelines, recommends that clinicians use age-adjusted D-dimer thresholds in patients greater than 50 years of age with: a) a low probability of PE who do not meet all Pulmonary Embolism Rule Out Criteria, or b) in those with intermediate probability of PE.   The formula for an age-adjusted D-dimer cut-off is \"age/100\".  For example, a 60 year old patient would have an age-adjusted cut-off of 0.60 MCGFEU/mL and an 80 year old 0.80 MCGFEU/mL.    CBC & Differential [[id number]]  (Abnormal) Collected: 10/01/23 0720    Specimen: Blood from Arm, Right Updated: 10/01/23 0727    " Narrative:      The following orders were created for panel order CBC & Differential.  Procedure                               Abnormality         Status                     ---------                               -----------         ------                     CBC Auto Differential[[id number]]        Abnormal            Final result                 Please view results for these tests on the individual orders.    CBC Auto Differential [[id number]]  (Abnormal) Collected: 10/01/23 0720    Specimen: Blood from Arm, Right Updated: 10/01/23 0727     WBC 8.64 10*3/mm3      RBC 5.55 10*6/mm3      Hemoglobin 15.8 g/dL      Hematocrit 49.9 %      MCV 89.9 fL      MCH 28.5 pg      MCHC 31.7 g/dL      RDW 14.6 %      RDW-SD 48.2 fl      MPV 10.0 fL      Platelets 293 10*3/mm3      Neutrophil % 59.0 %      Lymphocyte % 25.0 %      Monocyte % 11.2 %      Eosinophil % 3.6 %      Basophil % 0.7 %      Immature Grans % 0.5 %      Neutrophils, Absolute 5.10 10*3/mm3      Lymphocytes, Absolute 2.16 10*3/mm3      Monocytes, Absolute 0.97 10*3/mm3      Eosinophils, Absolute 0.31 10*3/mm3      Basophils, Absolute 0.06 10*3/mm3      Immature Grans, Absolute 0.04 10*3/mm3      nRBC 0.0 /100 WBC           Imaging Results (Last 72 Hours)       Procedure Component Value Units Date/Time    CT Angiogram Chest [[id number]] Collected: 10/01/23 0919     Updated: 10/01/23 0926    Narrative:      CT ANGIOGRAM CHEST-10/1/2023     INDICATION:  Worsening shortness of air and cough. COVID diagnosis 8 days ago.     TECHNIQUE:  CT angiogram of the chest with IV contrast. 3-D reconstructions were  obtained and reviewed.   Radiation dose reduction techniques included  automated exposure control or exposure modulation based on body size.  Count of known CT and cardiac nuc med studies performed in previous 12  months: 0.     COMPARISON:  None available.     FINDINGS:  Adequate opacification of the pulmonary arteries with no filling  defects. Thoracic aorta  normal in course and caliber without dissection.  Heart size is normal. No pericardial effusion.     No pleural effusion. No pneumothorax. No focal pulmonary infiltrates. 5  mm noncalcified pulmonary nodule in the left lower lobe (image 87).     Visualized upper abdomen is unremarkable.     No acute osseous abnormality.       Impression:      1. Negative for pulmonary embolus.  2. Negative for thoracic aortic aneurysm/dissection.  3. No acute pulmonary process.  4. 5 mm noncalcified pulmonary nodule in the left lower lobe. Management  recommendation: Based on current published guidelines, uncomplicated  pulmonary nodules less than 6 mm do not require CT follow-up. In high  risk patients, follow-up CT in 12 months is optional. ([employer] guidelines, 2017).     This report was finalized on 10/1/2023 9:24 AM by Dr. Kevin Mendez MD.       XR Chest 1 View [[id number]] Collected: 10/01/23 0805     Updated: 10/01/23 0808    Narrative:      XR CHEST 1 VW-, 10/1/2023     HISTORY:  Shortness of breath. COVID diagnosis 1 week ago.     COMPARISON:  *  None available     FINDINGS:  Single frontal view(s) of the chest. The lungs are clear. No pleural  effusions. No pneumothorax. Heart size and mediastinal contours are  within normal limits. Pulmonary vasculature is unremarkable. No acute  osseous abnormalities.          Impression:      No acute cardiopulmonary findings.     This report was finalized on 10/1/2023 8:05 AM by Dr. Kevin Mendze MD.             ECG/EMG Results (last 72 hours)       ** No results found for the last 72 hours. **          Orders (last 72 hrs)        Start     Ordered    10/03/23 0900  insulin detemir (LEVEMIR) injection 70 Units  Daily         10/02/23 0918    10/03/23 0600  Document Pulse Oximetry - On Room Air / Home O2 Level  Daily      Comments: Room Air Oxygen Levels Should Only Be Measured if Patient Oxygen Needs are <4L  Home O2 Oxygen Levels Should Only Be Measured Once Patient Within  2L of Home O2 Baseline  Reapply Oxygen if O2 Sat Drops Below 88%    10/01/23 1034    10/02/23 1800  ferrous sulfate EC tablet 324 mg  2 Times Daily With Meals         10/02/23 1124    10/02/23 1225  POC Glucose Once  PROCEDURE ONCE        Comments: Complete no more than 45 minutes prior to patient eating      10/02/23 1219    10/02/23 1215  ascorbic acid (VITAMIN C) tablet 500 mg  Daily         10/02/23 1124    10/02/23 1215  aspirin EC tablet 81 mg  Daily         10/02/23 1124    10/02/23 1215  atorvastatin (LIPITOR) tablet 40 mg  Daily         10/02/23 1124    10/02/23 1215  b complex-vitamin c-folic acid (NEPHRO-AMIE) tablet 1 tablet  Daily         10/02/23 1124    10/02/23 1215  cholecalciferol (VITAMIN D3) tablet 1,000 Units  Daily         10/02/23 1124    10/02/23 1215  estradiol (ESTRACE) tablet 2 mg  Daily         10/02/23 1124    10/02/23 1215  cetirizine (zyrTEC) tablet 10 mg  Daily         10/02/23 1124    10/02/23 1122  cyclobenzaprine (FLEXERIL) tablet 10 mg  Nightly PRN         10/02/23 1124    10/02/23 1122  busPIRone (BUSPAR) tablet 5 mg  3 Times Daily PRN         10/02/23 1124    10/02/23 0930  methylPREDNISolone sodium succinate (SOLU-Medrol) injection 60 mg  Every 6 Hours         10/02/23 0917    10/02/23 0917  Code Status and Medical Interventions:  Continuous         10/02/23 0916    10/02/23 0917  Respiratory Panel PCR w/COVID-19(SARS-CoV-2) [facility]/WILIAM/DINORA/PAD/COR/MAD/JASON In-House, NP Swab in UTM/VTM, 3-4 HR TAT - Swab, Nasopharynx  Once         10/02/23 0916    10/02/23 0900  Enoxaparin Sodium (LOVENOX) syringe 40 mg  Every 24 Hours         10/01/23 1122    10/02/23 0804  POC Glucose Once  PROCEDURE ONCE        Comments: Complete no more than 45 minutes prior to patient eating      10/02/23 0757    10/02/23 0600  Tobacco Cessation Education  Daily      Comments: Document in Education Activity    10/01/23 1034    10/02/23 0600  Respiratory Treatment Education (MDI / Spacer / Nebulizer)  Daily       Comments: Document in Education Activity    10/01/23 1034    10/02/23 0600  COPD Education  Daily      Comments: Document in Education Activity    10/01/23 1034    10/02/23 0600  Basic Metabolic Panel  Morning Draw         10/01/23 1034    10/02/23 0600  CBC Auto Differential  Morning Draw         10/01/23 1034    10/02/23 0600  Hemoglobin A1c  Morning Draw         10/01/23 1105    10/01/23 2126  POC Glucose Once  PROCEDURE ONCE        Comments: Complete no more than 45 minutes prior to patient eating      10/01/23 2117    10/01/23 2100  insulin detemir (LEVEMIR) injection 20 Units  Nightly         10/01/23 1106    10/01/23 2100  atorvastatin (LIPITOR) tablet 40 mg  Nightly,   Status:  Discontinued         10/01/23 1107    10/01/23 1730  Insulin Aspart (novoLOG) injection 3-14 Units  4 Times Daily Before Meals & Nightly         10/01/23 1304    10/01/23 1718  POC Glucose Once  PROCEDURE ONCE        Comments: Complete no more than 45 minutes prior to patient eating      10/01/23 1709    10/01/23 1700  POC Glucose TID AC  3 Times Daily Before Meals      Comments: Complete no more than 45 minutes prior to patient eating      10/01/23 1304    10/01/23 1415  Insulin Aspart (novoLOG) injection 3-14 Units  Once         10/01/23 1329    10/01/23 1400  Incentive Spirometry  Every 4 Hours While Awake       10/01/23 1123    10/01/23 1303  dextrose (GLUTOSE) oral gel 15 g  Every 15 Minutes PRN         10/01/23 1304    10/01/23 1303  dextrose (D50W) (25 g/50 mL) IV injection 25 g  Every 15 Minutes PRN         10/01/23 1304    10/01/23 1300  ipratropium-albuterol (DUO-NEB) nebulizer solution 3 mL  Every 6 Hours - RT         10/01/23 1034    10/01/23 1253  Diet: Diabetic Diets; Consistent Carbohydrate; Texture: Regular Texture (IDDSI 7); Fluid Consistency: Thin (IDDSI 0)  Diet Effective Now         10/01/23 1253    10/01/23 1237  POC Glucose Once  PROCEDURE ONCE        Comments: Complete no more than 45 minutes prior to patient  eating      10/01/23 1220    10/01/23 1200  Cough / Deep Breathe  Every 4 Hours       10/01/23 1034    10/01/23 1200  insulin detemir (LEVEMIR) injection 60 Units  Daily,   Status:  Discontinued         10/01/23 1106    10/01/23 1200  lisinopril (PRINIVIL,ZESTRIL) tablet 20 mg  Every 24 Hours Scheduled         10/01/23 1107    10/01/23 1200  meloxicam (MOBIC) tablet 15 mg  Daily         10/01/23 1107    10/01/23 1103  Initiate Glucommander™ SQ  Once,   Status:  Canceled         10/01/23 1105    10/01/23 1103  RN to Order STAT Glucose (Lab Performed) for POC Glucose <10 or >600  Continuous        Comments: Do NOT Delay Treatment of Unstable Patient to Obtain Glucose Sample From Lab  Notify Provider of Results    10/01/23 1105    10/01/23 1102  dextrose (GLUTOSE) oral gel 15 g  Every 15 Minutes PRN,   Status:  Discontinued         10/01/23 1105    10/01/23 1102  dextrose (D50W) (25 g/50 mL) IV injection 10-50 mL  Every 15 Minutes PRN,   Status:  Discontinued         10/01/23 1105    10/01/23 1102  glucagon (GLUCAGEN) injection 1 mg  Every 15 Minutes PRN,   Status:  Discontinued         10/01/23 1105    10/01/23 1050  methylPREDNISolone sodium succinate (SOLU-Medrol) injection 40 mg  Every 8 Hours,   Status:  Discontinued         10/01/23 1034    10/01/23 1050  guaiFENesin (MUCINEX) 12 hr tablet 600 mg  Every 12 Hours Scheduled         10/01/23 1034    10/01/23 1034  benzonatate (TESSALON) capsule 200 mg  3 Times Daily PRN         10/01/23 1034    10/01/23 1033  albuterol (PROVENTIL) nebulizer solution 0.083% 2.5 mg/3mL  Every 4 Hours PRN         10/01/23 1034    10/01/23 1033  Cardiac Monitoring  Continuous        Comments: Follow Standing Orders As Outlined in Process Instructions (Open Order Report to View Full Instructions)    10/01/23 1032    10/01/23 1033  Reason for COPD Admission: New Oxygen Requirements  Once         10/01/23 1034    10/01/23 1031  Inpatient Admission  Once         10/01/23 1032    10/01/23  0941  Initiate Observation Status  Once         10/01/23 0942    10/01/23 0921  iopamidol (ISOVUE-370) 76 % injection 100 mL  Once in Imaging         10/01/23 0905    10/01/23 0802  CT Angiogram Chest  1 Time Imaging         10/01/23 0802    10/01/23 0717  methylPREDNISolone sodium succinate (SOLU-Medrol) injection 125 mg  Once         10/01/23 0701    10/01/23 0717  ipratropium-albuterol (DUO-NEB) nebulizer solution 3 mL  Once         10/01/23 0701    10/01/23 0717  albuterol (PROVENTIL) nebulizer solution 0.083% 2.5 mg/3mL  Every 15 Minutes         10/01/23 0701    10/01/23 0702  CBC & Differential  Once         10/01/23 0701    10/01/23 0702  Comprehensive Metabolic Panel  Once         10/01/23 0701    10/01/23 0702  XR Chest 1 View  1 Time Imaging         10/01/23 0701    10/01/23 0702  D-dimer, Quantitative  Once         10/01/23 0701    10/01/23 0702  CBC Auto Differential  PROCEDURE ONCE         10/01/23 0702    10/01/23 0701  Insert Peripheral IV  Once        See Hyperspace for full Linked Orders Report.    10/01/23 0701    10/01/23 0700  sodium chloride 0.9 % flush 10 mL  As Needed        See Hyperspace for full Linked Orders Report.    10/01/23 0701    Unscheduled  POC Glucose PRN  As Needed      Comments: Complete no more than 45 minutes prior to patient eating      10/01/23 1105    Unscheduled  Treat Hypoglycemia As Recommended By Glucommander™ & Notify Provider of Treatment  As Needed      Comments: Follow Hypoglycemia Orders As Outlined in Process Instructions (Open Order Report to View Full Instructions)  Notify Provider Any Time Hypoglycemia Treatment is Administered    10/01/23 1105    Unscheduled  Follow Hypoglycemia Standing Orders For Blood Glucose <70 & Notify Provider of Treatment  As Needed      Comments: Follow Hypoglycemia Orders As Outlined in Process Instructions (Open Order Report to View Full Instructions)  Notify Provider Any Time Hypoglycemia Treatment is Administered    10/01/23 1304     Unscheduled  Oxygen Therapy- Nasal Cannula; Titrate 1-6 LPM Per SpO2; 88 - 92%  Continuous PRN       10/02/23 0610    --  Mirabegron ER (MYRBETRIQ) 25 MG tablet sustained-release 24 hour 24 hr tablet  Daily         10/01/23 1207    --  estradiol (ESTRACE) 2 MG tablet  Daily         10/01/23 1207    --  meloxicam (MOBIC) 15 MG tablet  Daily         10/01/23 1207    --  atorvastatin (LIPITOR) 40 MG tablet  Daily         10/01/23 1207    --  Insulin Glargine (BASAGLAR KWIKPEN SC)  Daily         10/01/23 1207    --  lisinopril (PRINIVIL,ZESTRIL) 10 MG tablet  Daily         10/01/23 1207    --  ferrous sulfate 325 (65 FE) MG tablet  2 Times Daily With Meals         10/01/23 1207    --  cyclobenzaprine (FLEXERIL) 10 MG tablet  Nightly PRN         10/01/23 1207    --  busPIRone (BUSPAR) 5 MG tablet  3 Times Daily PRN         10/01/23 1207    --  ALBUTEROL SULFATE IN  Every 6 Hours PRN         10/01/23 1207    --  zolpidem (Ambien) 10 MG tablet  Nightly PRN         10/01/23 1207    --  levocetirizine (XYZAL) 5 MG tablet  Every Evening         10/01/23 1207    --  aspirin 81 MG EC tablet  Daily         10/01/23 1207    --  ipratropium-albuterol (DUO-NEB) 0.5-2.5 mg/3 ml nebulizer  Every 4 Hours PRN         10/01/23 1207    --  Plecanatide (Trulance) 3 MG tablet  Daily PRN         10/01/23 1207    --  furosemide (LASIX) 20 MG tablet  Daily PRN         10/01/23 1207    --  B Complex Vitamins (vitamin b complex) capsule capsule  Daily         10/01/23 1207    --  Ascorbic Acid (Vitamin C) 500 MG capsule  Daily         10/01/23 1207    --  cholecalciferol (VITAMIN D3) 1.25 MG (75712 UT) capsule  Every 7 Days         10/01/23 1207    --  Cholecalciferol 25 MCG (1000 UT) tablet  Daily         10/01/23 1207                  Operative/Procedure Notes (last 72 hours)  Notes from 09/29/23 1308 through 10/02/23 1308   No notes of this type exist for this encounter.          Physician Progress Notes (last 72 hours)        Phil  "Agustín MALIK MD at 10/02/23 0918          Hospitalist Team      Patient Care Team:  Provider, No Known as PCP - General      Chief Complaint:  Follow-up AECOPD    Subjective    Feels a little better this morning, but she reports she desaturated to 82% as she was up to brush her teeth.  She reports no dyspnea at rest.  She denies chest pain.  Tolerating PO.    Objective    Vital Signs  Temp:  [97.9 °F (36.6 °C)-98.3 °F (36.8 °C)] 98.3 °F (36.8 °C)  Heart Rate:  [77-95] 78  Resp:  [16-22] 16  BP: (120-141)/(64-77) 120/66  Oxygen Therapy  SpO2: 92 %  Pulse Oximetry Type: Continuous  Device (Oxygen Therapy): nasal cannula  Flow (L/min): 2}    Flowsheet Rows      Flowsheet Row First Filed Value   Admission Height 177.8 cm (70\") Documented at 10/01/2023 1103   Admission Weight 95.2 kg (209 lb 14.1 oz) Documented at 10/01/2023 1103          Physical Exam:    General: Appears stated age in no acute distress.  Lungs: Diminished breath sounds w/ scattered expiratory wheezes and squeeks.  CV: Regular rate and rhythm.  No murmur appreciated.  Radial pulses are 2+ and symmetric.  Abdomen: Obese, soft, and non-tender w/ active bowel sounds.  MSK: No C/C/E.  Neuro: CN II-XII grossly intact.  Psych: Normal affect.  Ox3.    Results Review:     I reviewed the patient's new clinical results.    Lab Results (last 24 hours)       Procedure Component Value Units Date/Time    POC Glucose Once [[id number]]  (Abnormal) Collected: 10/02/23 0757    Specimen: Blood Updated: 10/02/23 0803     Glucose 243 mg/dL     Hemoglobin A1c [[id number]]  (Abnormal) Collected: 10/02/23 0353    Specimen: Blood Updated: 10/02/23 0450     Hemoglobin A1C 7.50 %     Narrative:      Hemoglobin A1C Ranges:    Increased Risk for Diabetes  5.7% to 6.4%  Diabetes                     >= 6.5%  Diabetic Goal                < 7.0%    Basic Metabolic Panel [[id number]]  (Abnormal) Collected: 10/02/23 0353    Specimen: Blood Updated: 10/02/23 0438     Glucose 266 mg/dL      BUN " 17 mg/dL      Creatinine 0.49 mg/dL      Sodium 140 mmol/L      Potassium 4.7 mmol/L      Chloride 104 mmol/L      CO2 26.9 mmol/L      Calcium 8.7 mg/dL      BUN/Creatinine Ratio 34.7     Anion Gap 9.1 mmol/L      eGFR 107.4 mL/min/1.73     Narrative:      GFR Normal >60  Chronic Kidney Disease <60  Kidney Failure <15      CBC Auto Differential [[id number]]  (Abnormal) Collected: 10/02/23 0353    Specimen: Blood Updated: 10/02/23 0416     WBC 11.79 10*3/mm3      RBC 5.21 10*6/mm3      Hemoglobin 14.9 g/dL      Hematocrit 46.4 %      MCV 89.1 fL      MCH 28.6 pg      MCHC 32.1 g/dL      RDW 14.4 %      RDW-SD 46.2 fl      MPV 10.1 fL      Platelets 268 10*3/mm3      Neutrophil % 80.8 %      Lymphocyte % 11.1 %      Monocyte % 6.9 %      Eosinophil % 0.0 %      Basophil % 0.3 %      Immature Grans % 0.9 %      Neutrophils, Absolute 9.53 10*3/mm3      Lymphocytes, Absolute 1.31 10*3/mm3      Monocytes, Absolute 0.81 10*3/mm3      Eosinophils, Absolute 0.00 10*3/mm3      Basophils, Absolute 0.03 10*3/mm3      Immature Grans, Absolute 0.11 10*3/mm3      nRBC 0.0 /100 WBC     POC Glucose Once [[id number]]  (Abnormal) Collected: 10/01/23 2117    Specimen: Blood Updated: 10/01/23 2125     Glucose 337 mg/dL     POC Glucose Once [[id number]]  (Abnormal) Collected: 10/01/23 1709    Specimen: Blood Updated: 10/01/23 1717     Glucose 340 mg/dL     POC Glucose Once [[id number]]  (Abnormal) Collected: 10/01/23 1220    Specimen: Blood Updated: 10/01/23 1236     Glucose 247 mg/dL             Imaging Results (Last 24 Hours)       Procedure Component Value Units Date/Time    CT Angiogram Chest [[id number]] Collected: 10/01/23 0919     Updated: 10/01/23 0926    Narrative:      CT ANGIOGRAM CHEST-10/1/2023     INDICATION:  Worsening shortness of air and cough. COVID diagnosis 8 days ago.     TECHNIQUE:  CT angiogram of the chest with IV contrast. 3-D reconstructions were  obtained and reviewed.   Radiation dose reduction techniques  included  automated exposure control or exposure modulation based on body size.  Count of known CT and cardiac nuc med studies performed in previous 12  months: 0.     COMPARISON:  None available.     FINDINGS:  Adequate opacification of the pulmonary arteries with no filling  defects. Thoracic aorta normal in course and caliber without dissection.  Heart size is normal. No pericardial effusion.     No pleural effusion. No pneumothorax. No focal pulmonary infiltrates. 5  mm noncalcified pulmonary nodule in the left lower lobe (image 87).     Visualized upper abdomen is unremarkable.     No acute osseous abnormality.       Impression:      1. Negative for pulmonary embolus.  2. Negative for thoracic aortic aneurysm/dissection.  3. No acute pulmonary process.  4. 5 mm noncalcified pulmonary nodule in the left lower lobe. Management  recommendation: Based on current published guidelines, uncomplicated  pulmonary nodules less than 6 mm do not require CT follow-up. In high  risk patients, follow-up CT in 12 months is optional. ([employer] guidelines, 2017).     This report was finalized on 10/1/2023 9:24 AM by Dr. Kevin Mendez MD.               X-ray reviewed personally by physician.        Medication Review:   I have reviewed the patient's current medication list    Current Facility-Administered Medications:     albuterol (PROVENTIL) nebulizer solution 0.083% 2.5 mg/3mL, 2.5 mg, Nebulization, Q4H PRN, Michael Sandoval DO    atorvastatin (LIPITOR) tablet 40 mg, 40 mg, Oral, Nightly, Mcihael Sandoval DO, 40 mg at 10/01/23 2009    benzonatate (TESSALON) capsule 200 mg, 200 mg, Oral, TID PRN, Michael Sandoval DO, 200 mg at 10/01/23 2009    dextrose (D50W) (25 g/50 mL) IV injection 25 g, 25 g, Intravenous, Q15 Min PRN, Michael Sandoval DO    dextrose (GLUTOSE) oral gel 15 g, 15 g, Oral, Q15 Min PRN, Michael Sandoval DO    Enoxaparin Sodium (LOVENOX) syringe 40 mg, 40 mg, Subcutaneous, Q24H, Michael Sandoval,      guaiFENesin (MUCINEX) 12 hr tablet 600 mg, 600 mg, Oral, Q12H, Michael Sandoval DO, 600 mg at 10/01/23 2009    Insulin Aspart (novoLOG) injection 3-14 Units, 3-14 Units, Subcutaneous, 4x Daily AC & at Bedtime, Michael Sandoval DO, 10 Units at 10/01/23 2125    insulin detemir (LEVEMIR) injection 20 Units, 20 Units, Subcutaneous, Nightly, Michael Sandoval DO, 20 Units at 10/01/23 2124    [START ON 10/3/2023] insulin detemir (LEVEMIR) injection 70 Units, 70 Units, Subcutaneous, Daily, Agustín Villarreal MD    ipratropium-albuterol (DUO-NEB) nebulizer solution 3 mL, 3 mL, Nebulization, Q6H - RT, Michael Sandoval DO, 3 mL at 10/02/23 0711    lisinopril (PRINIVIL,ZESTRIL) tablet 20 mg, 20 mg, Oral, Q24H, Michael Sandoval DO, 20 mg at 10/01/23 1339    meloxicam (MOBIC) tablet 15 mg, 15 mg, Oral, Daily, Michael Sandoval DO, 15 mg at 10/01/23 1339    methylPREDNISolone sodium succinate (SOLU-Medrol) injection 60 mg, 60 mg, Intravenous, Q6H, Agustín Villarreal MD    [COMPLETED] Insert Peripheral IV, , , Once **AND** sodium chloride 0.9 % flush 10 mL, 10 mL, Intravenous, PRN, Juwan Munoz MD, 10 mL at 10/01/23 2009      Assessment & Plan     AECOPD: Will increase steroid frequency and dose today.  Continue nebs.  I will check a vitral panel to exclude RSV.  CXR and CT negative for acute process.  Diabetes Mellitus, Type 2 in Obese: A1c near goal at 7.5%.  Increased basal insulin dose given blood glucose trend and change in steroid dose and freqeuncy.  Anxiety: resume home BuSpar.  Essential Hypertension: At goal on exam.  Trend is good.  No change to current regimen.    Plan for disposition: Predicated on hospital course.    Agustín Villarreal MD  10/02/23  09:18 EDT    Electronically signed by Agustín Villarreal MD at 10/02/23 1125       Consult Notes (last 72 hours)  Notes from 09/29/23 1308 through 10/02/23 1308   No notes of this type exist for this encounter.

## 2023-10-02 NOTE — CASE MANAGEMENT/SOCIAL WORK
Discharge Planning Assessment  [facility]     Patient Name: Magda Nath  MRN: [id number]  Today's Date: 10/2/2023    Admit Date: 10/1/2023    Plan: Home with    Discharge Needs Assessment       Row Name 10/02/23 1155       Living Environment    People in Home spouse    Name(s) of People in Home marie Blackburn    Current Living Arrangements home    Duration at Residence 20 + Y    Potentially Unsafe Housing Conditions none    Primary Care Provided by self    Provides Primary Care For no one    Caregiving Concerns No care giving concerns voiced by patient at this time.    Family Caregiver if Needed spouse    Family Caregiver Names Alxeey,     Quality of Family Relationships unable to assess    Able to Return to Prior Arrangements yes    Living Arrangement Comments Patient states she lives with her  in a single story house with a basement and two steps to gain entry       Resource/Environmental Concerns    Resource/Environmental Concerns none    Transportation Concerns none       Food Insecurity    Within the past 12 months, you worried that your food would run out before you got the money to buy more. Never true       Transition Planning    Patient/Family Anticipates Transition to home with family    Patient/Family Anticipated Services at Transition none    Transportation Anticipated family or friend will provide;car, drives self  pt states she has her car her for transportation home       Discharge Needs Assessment    Equipment Currently Used at Home nebulizer;pulse ox    Concerns to be Addressed denies needs/concerns at this time;adjustment to diagnosis/illness;discharge planning    Concerns Comments pt voiced no discharge needs at this time.    Anticipated Changes Related to Illness none    Equipment Needed After Discharge none    Outpatient/Agency/Support Group Needs --  pt declined needing these services at this time.    Discharge Facility/Level of Care Needs --  pt declined needing  "these services at this time.    Patient's Choice of Community Agency(s) pt states he [facility] after he hip surgery in April of this year    Current Discharge Risk --  none    Discharge Coordination/Progress Patient states he plans on returning home at discharge with her  to help if needed and voiced no discharge needs at this time.                   Discharge Plan       Row Name 10/02/23 0865       Plan    Plan Home with     Patient/Family in Agreement with Plan yes    Plan Comments 0930am, into room and introduced self and role of CM. Discussed discharge disposition with patient at bedside. Patient is currently sitting on the side of the bed and remains on supplemental oxygen via NC and voiced no concerns. Patient confirms that the info on her face sheet is correct and that she see's SVETA Briggs as PCP. She states she uses [facility] in [place] and currently has no problem picking up or paying for her med's. She also states she does not have a living will and declines information regarding one. Patient states she lives with her  in a single story house with a basement and two steps to gain entry and normally has no issues entering the home or maneuvering inside. She states she is independent with her ADL's, works and drives and has her car here for transportation home. She also states she uses a nebulizer and pulse ox at home but has \"all the equipment she needs from a hip surgery in April of this year\" and declines needing any other equipment at discharge. Patient states she used [facility] for [facility] therapy after her hip surgery but states she will not need HH at discharge and declined other services such as STR and LTC at this time. Patient states she plans on returning home at discharge with her  to help if needed and voiced no discharge needs at this time. She had no other questions or concerns regarding discharge plans. Name and number placed on white board in " room. CM will follow.                  Continued Care and Services - Admitted Since 10/1/2023    Coordination has not been started for this encounter.          Demographic Summary       Row Name 10/02/23 1154       General Information    Admission Type inpatient    Arrived From home    Referral Source admission list    Reason for Consult discharge planning    Preferred Language English       Contact Information    Permission Granted to Share Info With                    Functional Status    No documentation.                  Psychosocial    No documentation.                  Abuse/Neglect    No documentation.                  Legal    No documentation.                  Substance Abuse    No documentation.                  Patient Forms    No documentation.                     Yudelka Lawson RN

## 2023-10-02 NOTE — PROGRESS NOTES
"Hospitalist Team      Patient Care Team:  Provider, No Known as PCP - General      Chief Complaint:  Follow-up AECOPD    Subjective    Feels a little better this morning, but she reports she desaturated to 82% as she was up to brush her teeth.  She reports no dyspnea at rest.  She denies chest pain.  Tolerating PO.    Objective    Vital Signs  Temp:  [97.9 °F (36.6 °C)-98.3 °F (36.8 °C)] 98.3 °F (36.8 °C)  Heart Rate:  [77-95] 78  Resp:  [16-22] 16  BP: (120-141)/(64-77) 120/66  Oxygen Therapy  SpO2: 92 %  Pulse Oximetry Type: Continuous  Device (Oxygen Therapy): nasal cannula  Flow (L/min): 2}    Flowsheet Rows      Flowsheet Row First Filed Value   Admission Height 177.8 cm (70\") Documented at 10/01/2023 1103   Admission Weight 95.2 kg (209 lb 14.1 oz) Documented at 10/01/2023 1103          Physical Exam:    General: Appears stated age in no acute distress.  Lungs: Diminished breath sounds w/ scattered expiratory wheezes and squeeks.  CV: Regular rate and rhythm.  No murmur appreciated.  Radial pulses are 2+ and symmetric.  Abdomen: Obese, soft, and non-tender w/ active bowel sounds.  MSK: No C/C/E.  Neuro: CN II-XII grossly intact.  Psych: Normal affect.  Ox3.    Results Review:     I reviewed the patient's new clinical results.    Lab Results (last 24 hours)       Procedure Component Value Units Date/Time    POC Glucose Once [[id number]]  (Abnormal) Collected: 10/02/23 0757    Specimen: Blood Updated: 10/02/23 0803     Glucose 243 mg/dL     Hemoglobin A1c [[id number]]  (Abnormal) Collected: 10/02/23 0353    Specimen: Blood Updated: 10/02/23 0450     Hemoglobin A1C 7.50 %     Narrative:      Hemoglobin A1C Ranges:    Increased Risk for Diabetes  5.7% to 6.4%  Diabetes                     >= 6.5%  Diabetic Goal                < 7.0%    Basic Metabolic Panel [[id number]]  (Abnormal) Collected: 10/02/23 0353    Specimen: Blood Updated: 10/02/23 0438     Glucose 266 mg/dL      BUN 17 mg/dL      Creatinine 0.49 mg/dL     "  Sodium 140 mmol/L      Potassium 4.7 mmol/L      Chloride 104 mmol/L      CO2 26.9 mmol/L      Calcium 8.7 mg/dL      BUN/Creatinine Ratio 34.7     Anion Gap 9.1 mmol/L      eGFR 107.4 mL/min/1.73     Narrative:      GFR Normal >60  Chronic Kidney Disease <60  Kidney Failure <15      CBC Auto Differential [[id number]]  (Abnormal) Collected: 10/02/23 0353    Specimen: Blood Updated: 10/02/23 0416     WBC 11.79 10*3/mm3      RBC 5.21 10*6/mm3      Hemoglobin 14.9 g/dL      Hematocrit 46.4 %      MCV 89.1 fL      MCH 28.6 pg      MCHC 32.1 g/dL      RDW 14.4 %      RDW-SD 46.2 fl      MPV 10.1 fL      Platelets 268 10*3/mm3      Neutrophil % 80.8 %      Lymphocyte % 11.1 %      Monocyte % 6.9 %      Eosinophil % 0.0 %      Basophil % 0.3 %      Immature Grans % 0.9 %      Neutrophils, Absolute 9.53 10*3/mm3      Lymphocytes, Absolute 1.31 10*3/mm3      Monocytes, Absolute 0.81 10*3/mm3      Eosinophils, Absolute 0.00 10*3/mm3      Basophils, Absolute 0.03 10*3/mm3      Immature Grans, Absolute 0.11 10*3/mm3      nRBC 0.0 /100 WBC     POC Glucose Once [[id number]]  (Abnormal) Collected: 10/01/23 2117    Specimen: Blood Updated: 10/01/23 2125     Glucose 337 mg/dL     POC Glucose Once [[id number]]  (Abnormal) Collected: 10/01/23 1709    Specimen: Blood Updated: 10/01/23 1717     Glucose 340 mg/dL     POC Glucose Once [[id number]]  (Abnormal) Collected: 10/01/23 1220    Specimen: Blood Updated: 10/01/23 1236     Glucose 247 mg/dL             Imaging Results (Last 24 Hours)       Procedure Component Value Units Date/Time    CT Angiogram Chest [[id number]] Collected: 10/01/23 0919     Updated: 10/01/23 0926    Narrative:      CT ANGIOGRAM CHEST-10/1/2023     INDICATION:  Worsening shortness of air and cough. COVID diagnosis 8 days ago.     TECHNIQUE:  CT angiogram of the chest with IV contrast. 3-D reconstructions were  obtained and reviewed.   Radiation dose reduction techniques included  automated exposure control or exposure  modulation based on body size.  Count of known CT and cardiac nuc med studies performed in previous 12  months: 0.     COMPARISON:  None available.     FINDINGS:  Adequate opacification of the pulmonary arteries with no filling  defects. Thoracic aorta normal in course and caliber without dissection.  Heart size is normal. No pericardial effusion.     No pleural effusion. No pneumothorax. No focal pulmonary infiltrates. 5  mm noncalcified pulmonary nodule in the left lower lobe (image 87).     Visualized upper abdomen is unremarkable.     No acute osseous abnormality.       Impression:      1. Negative for pulmonary embolus.  2. Negative for thoracic aortic aneurysm/dissection.  3. No acute pulmonary process.  4. 5 mm noncalcified pulmonary nodule in the left lower lobe. Management  recommendation: Based on current published guidelines, uncomplicated  pulmonary nodules less than 6 mm do not require CT follow-up. In high  risk patients, follow-up CT in 12 months is optional. ([employer] guidelines, 2017).     This report was finalized on 10/1/2023 9:24 AM by Dr. Kevin Mendez MD.               X-ray reviewed personally by physician.        Medication Review:   I have reviewed the patient's current medication list    Current Facility-Administered Medications:     albuterol (PROVENTIL) nebulizer solution 0.083% 2.5 mg/3mL, 2.5 mg, Nebulization, Q4H PRN, Michael Sandoval DO    atorvastatin (LIPITOR) tablet 40 mg, 40 mg, Oral, Nightly, Michael Sandoval DO, 40 mg at 10/01/23 2009    benzonatate (TESSALON) capsule 200 mg, 200 mg, Oral, TID PRN, Michael Sandoval DO, 200 mg at 10/01/23 2009    dextrose (D50W) (25 g/50 mL) IV injection 25 g, 25 g, Intravenous, Q15 Min PRN, Michael Sandoval DO    dextrose (GLUTOSE) oral gel 15 g, 15 g, Oral, Q15 Min PRN, Michael Sandoval DO    Enoxaparin Sodium (LOVENOX) syringe 40 mg, 40 mg, Subcutaneous, Q24H, Michael Sandoval DO    guaiFENesin (MUCINEX) 12 hr tablet 600 mg, 600 mg, Oral,  Q12H, Michael Sandoval DO, 600 mg at 10/01/23 2009    Insulin Aspart (novoLOG) injection 3-14 Units, 3-14 Units, Subcutaneous, 4x Daily AC & at Bedtime, Michael Sandoval DO, 10 Units at 10/01/23 2125    insulin detemir (LEVEMIR) injection 20 Units, 20 Units, Subcutaneous, Nightly, Michael Sandoval DO, 20 Units at 10/01/23 2124    [START ON 10/3/2023] insulin detemir (LEVEMIR) injection 70 Units, 70 Units, Subcutaneous, Daily, Agustín Villarreal MD    ipratropium-albuterol (DUO-NEB) nebulizer solution 3 mL, 3 mL, Nebulization, Q6H - RT, Michael Sandoval DO, 3 mL at 10/02/23 0711    lisinopril (PRINIVIL,ZESTRIL) tablet 20 mg, 20 mg, Oral, Q24H, Michael Sandoval DO, 20 mg at 10/01/23 1339    meloxicam (MOBIC) tablet 15 mg, 15 mg, Oral, Daily, Michael Sandoval DO, 15 mg at 10/01/23 1339    methylPREDNISolone sodium succinate (SOLU-Medrol) injection 60 mg, 60 mg, Intravenous, Q6H, Agustín Villarreal MD    [COMPLETED] Insert Peripheral IV, , , Once **AND** sodium chloride 0.9 % flush 10 mL, 10 mL, Intravenous, PRN, Juwan Munoz MD, 10 mL at 10/01/23 2009      Assessment & Plan     AECOPD: Will increase steroid frequency and dose today.  Continue nebs.  I will check a vitral panel to exclude RSV.  CXR and CT negative for acute process.  Diabetes Mellitus, Type 2 in Obese: A1c near goal at 7.5%.  Increased basal insulin dose given blood glucose trend and change in steroid dose and freqeuncy.  Anxiety: resume home BuSpar.  Essential Hypertension: At goal on exam.  Trend is good.  No change to current regimen.    Plan for disposition: Predicated on hospital course.    Agustín Villarreal MD  10/02/23  09:18 EDT

## 2023-10-02 NOTE — PLAN OF CARE
Goal Outcome Evaluation:  Plan of Care Reviewed With: patient        Progress: no change  Outcome Evaluation: VSS, O2 2L NC, continues IV solumedrol, normal sinus rhythm on telemetry, PRN tessalon pearls for cough, ambulates independently, safety measures in place, call light within reach.

## 2023-10-03 PROCEDURE — 63710000001 INSULIN ASPART PER 5 UNITS: Performed by: FAMILY MEDICINE

## 2023-10-03 PROCEDURE — 25010000002 METHYLPREDNISOLONE PER 125 MG: Performed by: HOSPITALIST

## 2023-10-03 PROCEDURE — 94799 UNLISTED PULMONARY SVC/PX: CPT

## 2023-10-03 PROCEDURE — 63710000001 INSULIN DETEMIR PER 5 UNITS: Performed by: HOSPITALIST

## 2023-10-03 RX ORDER — METHYLPREDNISOLONE SODIUM SUCCINATE 125 MG/2ML
60 INJECTION, POWDER, LYOPHILIZED, FOR SOLUTION INTRAMUSCULAR; INTRAVENOUS EVERY 6 HOURS
Status: COMPLETED | OUTPATIENT
Start: 2023-10-03 | End: 2023-10-04

## 2023-10-03 RX ADMIN — INSULIN DETEMIR 25 UNITS: 100 INJECTION, SOLUTION SUBCUTANEOUS at 20:33

## 2023-10-03 RX ADMIN — INSULIN ASPART 8 UNITS: 100 INJECTION, SOLUTION INTRAVENOUS; SUBCUTANEOUS at 12:00

## 2023-10-03 RX ADMIN — METHYLPREDNISOLONE SODIUM SUCCINATE 60 MG: 125 INJECTION, POWDER, FOR SOLUTION INTRAMUSCULAR; INTRAVENOUS at 14:50

## 2023-10-03 RX ADMIN — INSULIN ASPART 10 UNITS: 100 INJECTION, SOLUTION INTRAVENOUS; SUBCUTANEOUS at 20:33

## 2023-10-03 RX ADMIN — LISINOPRIL 20 MG: 20 TABLET ORAL at 09:32

## 2023-10-03 RX ADMIN — INSULIN ASPART 10 UNITS: 100 INJECTION, SOLUTION INTRAVENOUS; SUBCUTANEOUS at 17:45

## 2023-10-03 RX ADMIN — IPRATROPIUM BROMIDE AND ALBUTEROL SULFATE 3 ML: .5; 3 SOLUTION RESPIRATORY (INHALATION) at 19:58

## 2023-10-03 RX ADMIN — ESTRADIOL 2 MG: 1 TABLET ORAL at 09:32

## 2023-10-03 RX ADMIN — ASPIRIN 81 MG: 81 TABLET, COATED ORAL at 09:32

## 2023-10-03 RX ADMIN — ATORVASTATIN CALCIUM 40 MG: 40 TABLET, FILM COATED ORAL at 09:32

## 2023-10-03 RX ADMIN — INSULIN DETEMIR 75 UNITS: 100 INJECTION, SOLUTION SUBCUTANEOUS at 09:32

## 2023-10-03 RX ADMIN — METHYLPREDNISOLONE SODIUM SUCCINATE 60 MG: 125 INJECTION, POWDER, FOR SOLUTION INTRAMUSCULAR; INTRAVENOUS at 05:18

## 2023-10-03 RX ADMIN — BENZONATATE 200 MG: 100 CAPSULE ORAL at 09:32

## 2023-10-03 RX ADMIN — FERROUS SULFATE TAB EC 324 MG (65 MG FE EQUIVALENT) 324 MG: 324 (65 FE) TABLET DELAYED RESPONSE at 09:32

## 2023-10-03 RX ADMIN — METHYLPREDNISOLONE SODIUM SUCCINATE 60 MG: 125 INJECTION, POWDER, FOR SOLUTION INTRAMUSCULAR; INTRAVENOUS at 09:33

## 2023-10-03 RX ADMIN — Medication 1 TABLET: at 09:32

## 2023-10-03 RX ADMIN — BUSPIRONE HYDROCHLORIDE 5 MG: 5 TABLET ORAL at 09:32

## 2023-10-03 RX ADMIN — CHOLECALCIFEROL TAB 25 MCG (1000 UNIT) 1000 UNITS: 25 TAB at 09:32

## 2023-10-03 RX ADMIN — OXYCODONE HYDROCHLORIDE AND ACETAMINOPHEN 500 MG: 500 TABLET ORAL at 09:32

## 2023-10-03 RX ADMIN — FERROUS SULFATE TAB EC 324 MG (65 MG FE EQUIVALENT) 324 MG: 324 (65 FE) TABLET DELAYED RESPONSE at 17:46

## 2023-10-03 RX ADMIN — GUAIFENESIN 600 MG: 600 TABLET, EXTENDED RELEASE ORAL at 09:32

## 2023-10-03 RX ADMIN — METHYLPREDNISOLONE SODIUM SUCCINATE 60 MG: 125 INJECTION, POWDER, FOR SOLUTION INTRAMUSCULAR; INTRAVENOUS at 20:29

## 2023-10-03 RX ADMIN — CETIRIZINE HYDROCHLORIDE 10 MG: 10 TABLET, FILM COATED ORAL at 09:32

## 2023-10-03 RX ADMIN — BUSPIRONE HYDROCHLORIDE 5 MG: 5 TABLET ORAL at 17:56

## 2023-10-03 RX ADMIN — GUAIFENESIN 600 MG: 600 TABLET, EXTENDED RELEASE ORAL at 20:29

## 2023-10-03 RX ADMIN — IPRATROPIUM BROMIDE AND ALBUTEROL SULFATE 3 ML: .5; 3 SOLUTION RESPIRATORY (INHALATION) at 11:14

## 2023-10-03 RX ADMIN — INSULIN ASPART 10 UNITS: 100 INJECTION, SOLUTION INTRAVENOUS; SUBCUTANEOUS at 08:15

## 2023-10-03 RX ADMIN — MELOXICAM 15 MG: 7.5 TABLET ORAL at 09:32

## 2023-10-03 RX ADMIN — IPRATROPIUM BROMIDE AND ALBUTEROL SULFATE 3 ML: .5; 3 SOLUTION RESPIRATORY (INHALATION) at 07:18

## 2023-10-03 RX ADMIN — IPRATROPIUM BROMIDE AND ALBUTEROL SULFATE 3 ML: .5; 3 SOLUTION RESPIRATORY (INHALATION) at 15:22

## 2023-10-03 NOTE — CASE MANAGEMENT/SOCIAL WORK
Continued Stay Note  [facility]     Patient Name: Magda Nath  MRN: [id number]  Today's Date: 10/3/2023    Admit Date: 10/1/2023    Plan: plan home with    Discharge Plan       Row Name 10/03/23 1136       Plan    Plan plan home with     Patient/Family in Agreement with Plan yes    Plan Comments Fllow up visit, patient is off unit initally, arrives to room with  and states she ambulated outside for the first time. Introduced self and CM # placed on white board. CM will follow for dc needs - possible need for new 02(?).                   Discharge Codes    No documentation.                       Bhavin Weber RN

## 2023-10-03 NOTE — PLAN OF CARE
"Goal Outcome Evaluation:  Plan of Care Reviewed With: patient        Progress: improving  Outcome Evaluation: Patient remained on 1L throughout shift. Ambulated in hallways and room. Able to shower today. Pt reported, \"Feeling emotionally better today.\" No c/o of pain/n/v. Patient is in better spirits and anxious to work on returning to baseline of no 02 with exertions.         "

## 2023-10-03 NOTE — PROGRESS NOTES
"Hospitalist Team      Patient Care Team:  Francesca Adrian PA as PCP - General (Physician Assistant)      Chief Complaint:  Follow-up AECOPD due to Acute Rhinovirus Infection    Subjective    Feeling much better this morning!  Ms. Nath reports she felt good enough to get herself cleaned up.  She denies chest pain, but still notes her SaO2 drop w/ any activity.  She is tolerating PO.    Objective    Vital Signs  Temp:  [98.1 °F (36.7 °C)-98.5 °F (36.9 °C)] 98.2 °F (36.8 °C)  Heart Rate:  [65-94] 74  Resp:  [18-24] 20  BP: (110-141)/(50-82) 141/82  Oxygen Therapy  SpO2: 92 %  Pulse Oximetry Type: Continuous  Device (Oxygen Therapy): nasal cannula  Flow (L/min): 2}    Flowsheet Rows      Flowsheet Row First Filed Value   Admission Height 177.8 cm (70\") Documented at 10/01/2023 1103   Admission Weight 95.2 kg (209 lb 14.1 oz) Documented at 10/01/2023 1103          Physical Exam:    General: Appears in no acute distress.  Lungs: Coarse expiratory wheezes are noted throughout all fields.  Respirations are non-labored.  CV: Regular rate and rhythm.  No murmurs appreciated.  Radial and pedal pulses are 2+ and symmetric.  Abdomen: Obese, soft, and non-tender w/ active bowel sounds.  MSK: No C/C/E.  Neuro: CN II-XII grossly intact.  Psych: Pleasant affect.  Ox3.    Results Review:     I reviewed the patient's new clinical results.    Lab Results (last 24 hours)       Procedure Component Value Units Date/Time    Respiratory Panel PCR w/COVID-19(SARS-CoV-2) [facility] In-House, NP Swab in UTM/VTM, 3-4 HR TAT - Swab, Nasopharynx [[id number]]  (Abnormal) Collected: 10/02/23 1037    Specimen: Swab from Nasopharynx Updated: 10/02/23 1825     ADENOVIRUS, PCR Not Detected     Coronavirus 229E Not Detected     Coronavirus HKU1 Not Detected     Coronavirus NL63 Not Detected     Coronavirus OC43 Not Detected     COVID19 Not Detected     Human Metapneumovirus Not Detected     Human Rhinovirus/Enterovirus Detected     " Influenza A PCR Not Detected     Influenza B PCR Not Detected     Parainfluenza Virus 1 Not Detected     Parainfluenza Virus 2 Not Detected     Parainfluenza Virus 3 Not Detected     Parainfluenza Virus 4 Not Detected     RSV, PCR Not Detected     Bordetella pertussis pcr Not Detected     Bordetella parapertussis PCR Not Detected     Chlamydophila pneumoniae PCR Not Detected     Mycoplasma pneumo by PCR Not Detected    Narrative:      In the setting of a positive respiratory panel with a viral infection PLUS a negative procalcitonin without other underlying concern for bacterial infection, consider observing off antibiotics or discontinuation of antibiotics and continue supportive care. If the respiratory panel is positive for atypical bacterial infection (Bordetella pertussis, Chlamydophila pneumoniae, or Mycoplasma pneumoniae), consider antibiotic de-escalation to target atypical bacterial infection.    POC Glucose Once [[id number]]  (Abnormal) Collected: 10/02/23 1219    Specimen: Blood Updated: 10/02/23 1224     Glucose 225 mg/dL             Imaging Results (Last 24 Hours)       ** No results found for the last 24 hours. **              Medication Review:   I have reviewed the patient's current medication list    Current Facility-Administered Medications:     albuterol (PROVENTIL) nebulizer solution 0.083% 2.5 mg/3mL, 2.5 mg, Nebulization, Q4H PRN, Michael Sandoval DO    ascorbic acid (VITAMIN C) tablet 500 mg, 500 mg, Oral, Daily, Agustín Villarreal MD, 500 mg at 10/03/23 0932    aspirin EC tablet 81 mg, 81 mg, Oral, Daily, Agustín Villarreal MD, 81 mg at 10/03/23 0932    atorvastatin (LIPITOR) tablet 40 mg, 40 mg, Oral, Daily, Agustín Villarreal MD, 40 mg at 10/03/23 0932    b complex-vitamin c-folic acid (NEPHRO-AMIE) tablet 1 tablet, 1 tablet, Oral, Daily, Agustín Villarreal MD, 1 tablet at 10/03/23 0932    benzonatate (TESSALON) capsule 200 mg, 200 mg, Oral, TID PRN, Michael Sandoval DO, 200 mg at 10/03/23 0932     busPIRone (BUSPAR) tablet 5 mg, 5 mg, Oral, TID PRN, Agustín Villarreal MD, 5 mg at 10/03/23 0932    cetirizine (zyrTEC) tablet 10 mg, 10 mg, Oral, Daily, Agustín Villarreal MD, 10 mg at 10/03/23 0932    cholecalciferol (VITAMIN D3) tablet 1,000 Units, 1,000 Units, Oral, Daily, Agustín Villarreal MD, 1,000 Units at 10/03/23 0932    cyclobenzaprine (FLEXERIL) tablet 10 mg, 10 mg, Oral, Nightly PRN, Agustín Villarreal MD    dextrose (D50W) (25 g/50 mL) IV injection 25 g, 25 g, Intravenous, Q15 Min PRN, Michael Sandoval DO    dextrose (D50W) (25 g/50 mL) IV injection 25 g, 25 g, Intravenous, Q15 Min PRN, Agustín Villarreal MD    dextrose (GLUTOSE) oral gel 15 g, 15 g, Oral, Q15 Min PRN, Michael Sandoval DO    dextrose (GLUTOSE) oral gel 15 g, 15 g, Oral, Q15 Min PRN, Agustín Villarreal MD    Enoxaparin Sodium (LOVENOX) syringe 40 mg, 40 mg, Subcutaneous, Q24H, Michael Sandoval DO, 40 mg at 10/02/23 0955    estradiol (ESTRACE) tablet 2 mg, 2 mg, Oral, Daily, Agustín Villarreal MD, 2 mg at 10/03/23 0932    ferrous sulfate EC tablet 324 mg, 324 mg, Oral, BID With Meals, Agustín Villarreal MD, 324 mg at 10/03/23 0932    guaiFENesin (MUCINEX) 12 hr tablet 600 mg, 600 mg, Oral, Q12H, Michael Sandoval DO, 600 mg at 10/03/23 0932    Insulin Aspart (novoLOG) injection 3-14 Units, 3-14 Units, Subcutaneous, 4x Daily AC & at Bedtime, Michael Sandoval DO, 10 Units at 10/03/23 0815    insulin detemir (LEVEMIR) injection 25 Units, 25 Units, Subcutaneous, Nightly, Agustín Villarreal MD    insulin detemir (LEVEMIR) injection 75 Units, 75 Units, Subcutaneous, Daily, Agustín Villarreal MD, 75 Units at 10/03/23 0932    insulin patient supplied pump, , Subcutaneous, Continuous, Agustín Villarreal MD    ipratropium-albuterol (DUO-NEB) nebulizer solution 3 mL, 3 mL, Nebulization, 4x Daily - RT, Luz Rosales APRN, 3 mL at 10/03/23 0718    ipratropium-albuterol (DUO-NEB) nebulizer solution 3 mL, 3 mL, Nebulization, Q4H PRN, Luz Rosales APRN    lisinopril  (PRINIVIL,ZESTRIL) tablet 20 mg, 20 mg, Oral, Q24H, Michael Sandoval DO, 20 mg at 10/03/23 0932    meloxicam (MOBIC) tablet 15 mg, 15 mg, Oral, Daily, Michael Sandoval DO, 15 mg at 10/03/23 0932    methylPREDNISolone sodium succinate (SOLU-Medrol) injection 60 mg, 60 mg, Intravenous, Q6H, Agustín Villarreal MD, 60 mg at 10/03/23 0933    [COMPLETED] Insert Peripheral IV, , , Once **AND** sodium chloride 0.9 % flush 10 mL, 10 mL, Intravenous, PRN, Juwan Munoz MD, 10 mL at 10/01/23 2009      Assessment & Plan     Acute Exacerbation of COPD due to Acute Rhinovirus Infection w/ Hypoxia: Appears clinically better, but exam is unchanged.  Will burst her w/ another day of IV solumedrol.  Continue nebs.  Check Mag and Phos in A.M.  Diabetes Mellitus, Type 2: A1c near goal at 7.5%.  Glucose trend a little better, but not at goal.  I've increased her basal insulin a little more.  Continue to monitor.  Anxiety: Much better today.  Continue BuSpar.  Essential Hypertension: Near goal on exam, but trend is good.  Continue to monitor.    Plan for disposition: Home when able.    Agustín Villarreal MD  10/03/23  09:51 EDT

## 2023-10-03 NOTE — PLAN OF CARE
Goal Outcome Evaluation:  Plan of Care Reviewed With: patient        Progress: improving  Outcome Evaluation: VS stable, NC 2 LPM, no c/o SOA. IV steroids. Pt reports breathing easier this morning, reports less wheezing. Rested well over night.

## 2023-10-04 LAB
ANION GAP SERPL CALCULATED.3IONS-SCNC: 9.4 MMOL/L (ref 5–15)
BUN SERPL-MCNC: 19 MG/DL (ref 8–23)
BUN/CREAT SERPL: 36.5 (ref 7–25)
CALCIUM SPEC-SCNC: 9 MG/DL (ref 8.6–10.5)
CHLORIDE SERPL-SCNC: 94 MMOL/L (ref 98–107)
CO2 SERPL-SCNC: 28.6 MMOL/L (ref 22–29)
CREAT SERPL-MCNC: 0.52 MG/DL (ref 0.57–1)
EGFRCR SERPLBLD CKD-EPI 2021: 105.9 ML/MIN/1.73
GLUCOSE SERPL-MCNC: 276 MG/DL (ref 65–99)
POTASSIUM SERPL-SCNC: 4.9 MMOL/L (ref 3.5–5.2)
SODIUM SERPL-SCNC: 132 MMOL/L (ref 136–145)

## 2023-10-04 PROCEDURE — 25010000002 ENOXAPARIN PER 10 MG: Performed by: FAMILY MEDICINE

## 2023-10-04 PROCEDURE — 25010000002 METHYLPREDNISOLONE PER 125 MG: Performed by: HOSPITALIST

## 2023-10-04 PROCEDURE — 94799 UNLISTED PULMONARY SVC/PX: CPT

## 2023-10-04 PROCEDURE — 63710000001 PREDNISONE PER 1 MG: Performed by: HOSPITALIST

## 2023-10-04 PROCEDURE — 63710000001 INSULIN ASPART PER 5 UNITS: Performed by: FAMILY MEDICINE

## 2023-10-04 PROCEDURE — 80048 BASIC METABOLIC PNL TOTAL CA: CPT | Performed by: HOSPITALIST

## 2023-10-04 PROCEDURE — 63710000001 INSULIN DETEMIR PER 5 UNITS: Performed by: HOSPITALIST

## 2023-10-04 RX ORDER — CALCIUM CARBONATE 500 MG/1
2 TABLET, CHEWABLE ORAL 3 TIMES DAILY PRN
Status: DISCONTINUED | OUTPATIENT
Start: 2023-10-04 | End: 2023-10-05 | Stop reason: HOSPADM

## 2023-10-04 RX ORDER — PREDNISONE 20 MG/1
40 TABLET ORAL 2 TIMES DAILY WITH MEALS
Status: DISCONTINUED | OUTPATIENT
Start: 2023-10-04 | End: 2023-10-05 | Stop reason: HOSPADM

## 2023-10-04 RX ADMIN — METHYLPREDNISOLONE SODIUM SUCCINATE 60 MG: 125 INJECTION, POWDER, FOR SOLUTION INTRAMUSCULAR; INTRAVENOUS at 04:27

## 2023-10-04 RX ADMIN — FERROUS SULFATE TAB EC 324 MG (65 MG FE EQUIVALENT) 324 MG: 324 (65 FE) TABLET DELAYED RESPONSE at 17:05

## 2023-10-04 RX ADMIN — IPRATROPIUM BROMIDE AND ALBUTEROL SULFATE 3 ML: .5; 3 SOLUTION RESPIRATORY (INHALATION) at 11:47

## 2023-10-04 RX ADMIN — ASPIRIN 81 MG: 81 TABLET, COATED ORAL at 09:37

## 2023-10-04 RX ADMIN — IPRATROPIUM BROMIDE AND ALBUTEROL SULFATE 3 ML: .5; 3 SOLUTION RESPIRATORY (INHALATION) at 07:54

## 2023-10-04 RX ADMIN — INSULIN ASPART 8 UNITS: 100 INJECTION, SOLUTION INTRAVENOUS; SUBCUTANEOUS at 17:05

## 2023-10-04 RX ADMIN — IPRATROPIUM BROMIDE AND ALBUTEROL SULFATE 3 ML: .5; 3 SOLUTION RESPIRATORY (INHALATION) at 15:13

## 2023-10-04 RX ADMIN — INSULIN ASPART 8 UNITS: 100 INJECTION, SOLUTION INTRAVENOUS; SUBCUTANEOUS at 21:01

## 2023-10-04 RX ADMIN — PREDNISONE 40 MG: 20 TABLET ORAL at 17:05

## 2023-10-04 RX ADMIN — BUSPIRONE HYDROCHLORIDE 5 MG: 5 TABLET ORAL at 10:15

## 2023-10-04 RX ADMIN — PREDNISONE 40 MG: 20 TABLET ORAL at 09:38

## 2023-10-04 RX ADMIN — INSULIN DETEMIR 60 UNITS: 100 INJECTION, SOLUTION SUBCUTANEOUS at 09:38

## 2023-10-04 RX ADMIN — ENOXAPARIN SODIUM 40 MG: 40 INJECTION SUBCUTANEOUS at 09:37

## 2023-10-04 RX ADMIN — INSULIN ASPART 10 UNITS: 100 INJECTION, SOLUTION INTRAVENOUS; SUBCUTANEOUS at 12:30

## 2023-10-04 RX ADMIN — LISINOPRIL 20 MG: 20 TABLET ORAL at 09:38

## 2023-10-04 RX ADMIN — GUAIFENESIN 600 MG: 600 TABLET, EXTENDED RELEASE ORAL at 09:38

## 2023-10-04 RX ADMIN — Medication 1 TABLET: at 09:38

## 2023-10-04 RX ADMIN — MELOXICAM 15 MG: 7.5 TABLET ORAL at 09:38

## 2023-10-04 RX ADMIN — CHOLECALCIFEROL TAB 25 MCG (1000 UNIT) 1000 UNITS: 25 TAB at 09:38

## 2023-10-04 RX ADMIN — OXYCODONE HYDROCHLORIDE AND ACETAMINOPHEN 500 MG: 500 TABLET ORAL at 09:38

## 2023-10-04 RX ADMIN — GUAIFENESIN 600 MG: 600 TABLET, EXTENDED RELEASE ORAL at 21:02

## 2023-10-04 RX ADMIN — ESTRADIOL 2 MG: 1 TABLET ORAL at 09:38

## 2023-10-04 RX ADMIN — FERROUS SULFATE TAB EC 324 MG (65 MG FE EQUIVALENT) 324 MG: 324 (65 FE) TABLET DELAYED RESPONSE at 09:38

## 2023-10-04 RX ADMIN — INSULIN ASPART 8 UNITS: 100 INJECTION, SOLUTION INTRAVENOUS; SUBCUTANEOUS at 09:39

## 2023-10-04 RX ADMIN — BUSPIRONE HYDROCHLORIDE 5 MG: 5 TABLET ORAL at 18:35

## 2023-10-04 RX ADMIN — ATORVASTATIN CALCIUM 40 MG: 40 TABLET, FILM COATED ORAL at 09:38

## 2023-10-04 RX ADMIN — IPRATROPIUM BROMIDE AND ALBUTEROL SULFATE 3 ML: .5; 3 SOLUTION RESPIRATORY (INHALATION) at 19:52

## 2023-10-04 RX ADMIN — CETIRIZINE HYDROCHLORIDE 10 MG: 10 TABLET, FILM COATED ORAL at 09:37

## 2023-10-04 RX ADMIN — ANTACID TABLETS 2 TABLET: 500 TABLET, CHEWABLE ORAL at 10:15

## 2023-10-04 NOTE — PLAN OF CARE
Goal Outcome Evaluation:  Plan of Care Reviewed With: patient        Progress: improving  Outcome Evaluation: VS stable, on 2 LPM NC overnight. IV steroids. Breathing has improved, breath sounds becoming more clear. Ambulating independently, tolerating activity well. Rested well overnight.

## 2023-10-04 NOTE — PAYOR COMM NOTE
"Magda Garcia (62 y.o. Female)     [address] [address]  Facility NPI: [id number]  [facility]  Fax: [phone]  Phone: [phone] ([place]: [id number])  Subject: ADMISSION NOTIFICATION AUTH CLINICALS  Reference #: [id number]  Please don't hesitate to contact me with any questions or concerns.            Date of Birth   1960    Social Security Number       Address   [address] [place], [place] [address]    Home Phone   [phone]    MRN   [id number]       Confucianism   Unknown    Marital Status                               Admission Date   10/1/23    Admission Type   Emergency    Admitting Provider   Michael Sandoval DO    Attending Provider   Michael Sandoval DO    Department, Room/Bed   [facility] MED SURG, 1414/1       Discharge Date       Discharge Disposition       Discharge Destination                                 Attending Provider: Michael Sandoval DO    Allergies: Macrobid [Nitrofurantoin], Erythromycin    Isolation: Droplet   Infection: Rhinovirus  (10/02/23)   Code Status: CPR    Ht: 177.8 cm (70\")   Wt: 95.2 kg (209 lb 14.1 oz)    Admission Cmt: None   Principal Problem: COPD exacerbation [J44.1]                   Active Insurance as of 10/1/2023       Primary Coverage       Payor Plan Insurance Group Employer/Plan Group    [employer] PPO [id number]       Payor Plan Address Payor Plan Phone Number Payor Plan Fax Number Effective Dates    [address] [phone]  1/1/2014 - None Entered    [place] [address]         Subscriber Name Subscriber Birth Date Member ID       MAGDA GARCIA 1960 [id number]                     Emergency Contacts        (Rel.) Home Phone Work Phone Mobile Phone    Alexey Garcia (Spouse) [phone] -- --              Insurance Information                  [employer] BLUE CROSS/[employer] BLUE [employer] PPO Phone: [phone]    " "Subscriber: Magda Nath Subscriber#: [id number]    Group#: [id number] Precert#: --             History & Physical        JaimeMichaelDO at 10/01/23 1036          [facility] HOSPITALIST     PCP: Provider, No Known    CODE status: Full     CHIEF COMPLAINT: SOB; low oxygen sats    HISTORY OF PRESENT ILLNESS:    61 y/o female with hx of DM II, hyperlipidemia, and recently diagnosed HTN, as well as tobacco abuse and likely COPD, presents to [facility] ED for persistent SOB.  Patient was diagnosed with COVID-19 about 10 days ago.  She was seen in immediate care and prescribed oral steroids, breathing treatments, Paxlovid, Symbicort, and doxycycline.  She started to feel a little better, and returned to work 5 days later.  Over the last few days, she has had more wheezing and a more productive cough with \"white foamy sputum.\"  She feels her lungs and chest getting tight, especially with exertion.  She has a pulse ox at home and has been noticing her sats drop to the 70s at night and mid to low 80s during the day.  It improves to high 80s after a breathing treatment, then drops back to low 80s.  She had fevers for the first few days of her illness, but none since.  She continues to smoke a few cigarettes before and after work.        Past Medical History:   Diagnosis Date    Diabetes mellitus      Past Surgical History:   Procedure Laterality Date    JOINT REPLACEMENT       History reviewed. No pertinent family history.  Social History     Tobacco Use    Smoking status: Every Day     Types: Cigarettes   Substance Use Topics    Alcohol use: Never    Drug use: Never     (Not in a hospital admission)    Allergies:  Macrobid [nitrofurantoin] and Erythromycin    Immunization History   Administered Date(s) Administered    COVID-19 (MODERNA) 1st,2nd,3rd Dose Monovalent 02/25/2021, 03/25/2021, 12/04/2021       REVIEW OF SYSTEMS:  Please see the above history of present illness for pertinent " positives and negatives.  The remainder of the patient's systems have been reviewed and are negative.      Vital Signs  Temp:  [98.4 °F (36.9 °C)] 98.4 °F (36.9 °C)  Heart Rate:  [90-99] 92  Resp:  [20-22] 20  BP: (153)/(81) 153/81         Physical Exam:  General: sitting up in chair; on oxygen; increased work of breathing  HENT: Head is atraumatic, normocephalic. Hearing is grossly intact  Eyes: Vision is grossly intact. Pupils appear equal and round.   Cardiovascular: RRR, S1-S2; no murmurs  Respiratory: Diffuse bilateral wheezing with diminished breath sounds at bases  Abdominal/GI: Soft, nontender, bowel sounds present. No rebound or guarding present.   Extremities: No peripheral edema noted.   Musculoskeletal: 5/5 MS Ues and Les   Skin: Warm and dry.   Psych: Mood and affect are appropriate. Cooperative with exam.   Neuro: No facial asymmetry noted. No focal deficits noted, hearing and vision are grossly intact.    Emotional Behavior: all of the following were found to be normal   Judgment and Insight   Mental Status   Memory   Mood and Affect: neither of the following found acutely        Depression                 Anxiety     Debilities: no signs of the following found    Physical Weakness     Handicaps     Disabilities     Agitation      Results Review:    I reviewed the patient's new clinical results.  Lab Results (most recent)       Procedure Component Value Units Date/Time    Comprehensive Metabolic Panel [[id number]]  (Abnormal) Collected: 10/01/23 0720    Specimen: Blood from Arm, Right Updated: 10/01/23 0744     Glucose 203 mg/dL      BUN 15 mg/dL      Creatinine 0.55 mg/dL      Sodium 139 mmol/L      Potassium 4.2 mmol/L      Chloride 101 mmol/L      CO2 26.5 mmol/L      Calcium 8.6 mg/dL      Total Protein 6.2 g/dL      Albumin 3.7 g/dL      ALT (SGPT) 12 U/L      AST (SGOT) 12 U/L      Alkaline Phosphatase 93 U/L      Total Bilirubin 0.5 mg/dL      Globulin 2.5 gm/dL      A/G Ratio 1.5 g/dL       "BUN/Creatinine Ratio 27.3     Anion Gap 11.5 mmol/L      eGFR 104.4 mL/min/1.73     Narrative:      GFR Normal >60  Chronic Kidney Disease <60  Kidney Failure <15      D-dimer, Quantitative [[id number]]  (Abnormal) Collected: 10/01/23 0720    Specimen: Blood from Arm, Right Updated: 10/01/23 0740     D-Dimer, Quantitative 0.73 MCGFEU/mL     Narrative:      According to the assay 's published package insert, a normal (<0.50 MCGFEU/mL) D-dimer result in conjunction with a non-high clinical probability assessment, excludes deep vein thrombosis (DVT) and pulmonary embolism (PE) with high sensitivity.    D-dimer values increase with age and this can make VTE exclusion of an older population difficult. To address this, the American College of Physicians, based on best available evidence and recent guidelines, recommends that clinicians use age-adjusted D-dimer thresholds in patients greater than 50 years of age with: a) a low probability of PE who do not meet all Pulmonary Embolism Rule Out Criteria, or b) in those with intermediate probability of PE.   The formula for an age-adjusted D-dimer cut-off is \"age/100\".  For example, a 60 year old patient would have an age-adjusted cut-off of 0.60 MCGFEU/mL and an 80 year old 0.80 MCGFEU/mL.    CBC & Differential [[id number]]  (Abnormal) Collected: 10/01/23 0720    Specimen: Blood from Arm, Right Updated: 10/01/23 0727    Narrative:      The following orders were created for panel order CBC & Differential.  Procedure                               Abnormality         Status                     ---------                               -----------         ------                     CBC Auto Differential[[id number]]        Abnormal            Final result                 Please view results for these tests on the individual orders.    CBC Auto Differential [[id number]]  (Abnormal) Collected: 10/01/23 0720    Specimen: Blood from Arm, Right Updated: 10/01/23 0727     WBC 8.64 " 10*3/mm3      RBC 5.55 10*6/mm3      Hemoglobin 15.8 g/dL      Hematocrit 49.9 %      MCV 89.9 fL      MCH 28.5 pg      MCHC 31.7 g/dL      RDW 14.6 %      RDW-SD 48.2 fl      MPV 10.0 fL      Platelets 293 10*3/mm3      Neutrophil % 59.0 %      Lymphocyte % 25.0 %      Monocyte % 11.2 %      Eosinophil % 3.6 %      Basophil % 0.7 %      Immature Grans % 0.5 %      Neutrophils, Absolute 5.10 10*3/mm3      Lymphocytes, Absolute 2.16 10*3/mm3      Monocytes, Absolute 0.97 10*3/mm3      Eosinophils, Absolute 0.31 10*3/mm3      Basophils, Absolute 0.06 10*3/mm3      Immature Grans, Absolute 0.04 10*3/mm3      nRBC 0.0 /100 WBC             Imaging Results (Most Recent)       Procedure Component Value Units Date/Time    CT Angiogram Chest [[id number]] Collected: 10/01/23 0919     Updated: 10/01/23 0926    Narrative:      CT ANGIOGRAM CHEST-10/1/2023     INDICATION:  Worsening shortness of air and cough. COVID diagnosis 8 days ago.     TECHNIQUE:  CT angiogram of the chest with IV contrast. 3-D reconstructions were  obtained and reviewed.   Radiation dose reduction techniques included  automated exposure control or exposure modulation based on body size.  Count of known CT and cardiac nuc med studies performed in previous 12  months: 0.     COMPARISON:  None available.     FINDINGS:  Adequate opacification of the pulmonary arteries with no filling  defects. Thoracic aorta normal in course and caliber without dissection.  Heart size is normal. No pericardial effusion.     No pleural effusion. No pneumothorax. No focal pulmonary infiltrates. 5  mm noncalcified pulmonary nodule in the left lower lobe (image 87).     Visualized upper abdomen is unremarkable.     No acute osseous abnormality.       Impression:      1. Negative for pulmonary embolus.  2. Negative for thoracic aortic aneurysm/dissection.  3. No acute pulmonary process.  4. 5 mm noncalcified pulmonary nodule in the left lower lobe. Management  recommendation: Based  on current published guidelines, uncomplicated  pulmonary nodules less than 6 mm do not require CT follow-up. In high  risk patients, follow-up CT in 12 months is optional. ([employer]  [employer] guidelines, 2017).     This report was finalized on 10/1/2023 9:24 AM by Dr. Kevin Mendez MD.       XR Chest 1 View [[id number]] Collected: 10/01/23 0805     Updated: 10/01/23 0808    Narrative:      XR CHEST 1 VW-, 10/1/2023     HISTORY:  Shortness of breath. COVID diagnosis 1 week ago.     COMPARISON:  *  None available     FINDINGS:  Single frontal view(s) of the chest. The lungs are clear. No pleural  effusions. No pneumothorax. Heart size and mediastinal contours are  within normal limits. Pulmonary vasculature is unremarkable. No acute  osseous abnormalities.          Impression:      No acute cardiopulmonary findings.     This report was finalized on 10/1/2023 8:05 AM by Dr. Kevin Mendez MD.                 ECG/EMG Results (most recent)       None          reviewed    Assessment & Plan     Acute hypoxic respiratory failure  Secondary to acute COPD exacerbation likely triggered by COVID-19  I personally reviewed CXR and CTA chest showing no acute findings or infiltrates  Failed outpatient therapy with oral steroids, inhalers, and doxycycline  Admit and start IV steroids q 8 hours with scheduled and prn Duonebs  Mucinex and tessalon prn  IS to bedside; wean oxygen as tolerated  Considered Azithromycin, but patient with allergy to erythromycin years ago, so hold off for now     Pulmonary nodule  Noted on CTA chest  Needs outpatient follow-up in 6-12 months     DM II uncontrolled with hyperglycemia  On basaglar 60 U daily at home  Continue this daily and add 20 U dose nightly while on steroids  Accuchecks with SSI  Check hgb A1c in AM     HTN  Recently diagnosed following surgery   Continue lisinopril daily     Hyperlipidemia  Continue statin    Chronic back pain   Continue daily mobic    DVT ppx: Lovenox/ambulation            Chronic stable conditions to be managed with home medications include the following conditions listed below. Also please note: Every effort was made to accurately obtain patient's home medications. This was done utilizing extensive chart review, ED documentation, recent pharmacy records, and where possible thorough discussion with the patient if medications were known by the patient. I have reviewed and edited the patient's home medications as per my efforts above and current med list can be found within home medications portion of this electronic medical record and is accurate as possible as of 10/01/23             I discussed the patient's findings and my recommendations with patient at bedside.      Michael Sandoval DO  10/01/23  10:37 EDT      At [facility], we believe that sharing information builds trust and better relationships. You are receiving this note because you recently visited [facility]. It is possible you will see health information before a provider has talked with you about it. This kind of information can be easy to misunderstand. To help you fully understand what it means for your health, we urge you to discuss this note with your provider.      Electronically signed by Michael Sandoval DO at 10/01/23 1120       Facility-Administered Medications as of 10/4/2023   Medication Dose Route Frequency Provider Last Rate Last Admin    [COMPLETED] albuterol (PROVENTIL) nebulizer solution 0.083% 2.5 mg/3mL  2.5 mg Nebulization Q15 Min Juwan Munoz MD   2.5 mg at 10/01/23 0737    albuterol (PROVENTIL) nebulizer solution 0.083% 2.5 mg/3mL  2.5 mg Nebulization Q4H PRN Michael Sandoval DO        ascorbic acid (VITAMIN C) tablet 500 mg  500 mg Oral Daily Agustín Villarreal MD   500 mg at 10/04/23 0938    aspirin EC tablet 81 mg  81 mg Oral Daily Agustín Villarreal MD   81 mg at 10/04/23 0937    atorvastatin (LIPITOR) tablet 40 mg  40 mg Oral Daily Agustín Villarreal MD   40 mg at 10/04/23 0938    b  complex-vitamin c-folic acid (NEPHRO-AMIE) tablet 1 tablet  1 tablet Oral Daily Agustín Villarreal MD   1 tablet at 10/04/23 0938    benzonatate (TESSALON) capsule 200 mg  200 mg Oral TID PRN Michael Sandoval DO   200 mg at 10/03/23 0932    busPIRone (BUSPAR) tablet 5 mg  5 mg Oral TID PRN Agustín Villarreal MD   5 mg at 10/04/23 1015    calcium carbonate (TUMS) chewable tablet 500 mg (200 mg elemental)  2 tablet Oral TID PRN Agustín Villarreal MD   2 tablet at 10/04/23 1015    cetirizine (zyrTEC) tablet 10 mg  10 mg Oral Daily Agustín Villarreal MD   10 mg at 10/04/23 0937    cholecalciferol (VITAMIN D3) tablet 1,000 Units  1,000 Units Oral Daily Agustín Villarreal MD   1,000 Units at 10/04/23 0938    cyclobenzaprine (FLEXERIL) tablet 10 mg  10 mg Oral Nightly PRN Agustín Villarreal MD        dextrose (D50W) (25 g/50 mL) IV injection 25 g  25 g Intravenous Q15 Min PRN Michael Sandoval DO        dextrose (D50W) (25 g/50 mL) IV injection 25 g  25 g Intravenous Q15 Min PRN Agustín Villarreal MD        dextrose (GLUTOSE) oral gel 15 g  15 g Oral Q15 Min PRN Michael Sandoval DO        dextrose (GLUTOSE) oral gel 15 g  15 g Oral Q15 Min PRN Agustín Villarreal MD        Enoxaparin Sodium (LOVENOX) syringe 40 mg  40 mg Subcutaneous Q24H Michael Sandoval DO   40 mg at 10/04/23 0937    estradiol (ESTRACE) tablet 2 mg  2 mg Oral Daily Agustín Villarreal MD   2 mg at 10/04/23 0938    ferrous sulfate EC tablet 324 mg  324 mg Oral BID With Meals Agustín Villarreal MD   324 mg at 10/04/23 0938    guaiFENesin (MUCINEX) 12 hr tablet 600 mg  600 mg Oral Q12H Michael Sandoval DO   600 mg at 10/04/23 0938    Insulin Aspart (novoLOG) injection 3-14 Units  3-14 Units Subcutaneous 4x Daily AC & at Bedtime Michael Sandoval DO   10 Units at 10/04/23 1230    [COMPLETED] Insulin Aspart (novoLOG) injection 3-14 Units  3-14 Units Subcutaneous Once Michael Sandoval DO   5 Units at 10/01/23 1338    insulin detemir (LEVEMIR) injection 60 Units  60 Units Subcutaneous Daily Phil  Agustín MALIK MD   60 Units at 10/04/23 0938    insulin patient supplied pump   Subcutaneous Continuous Agustín Villarreal MD        [COMPLETED] iopamidol (ISOVUE-370) 76 % injection 100 mL  100 mL Intravenous Once in imaging Juwan Munoz MD   100 mL at 10/01/23 0900    [COMPLETED] ipratropium-albuterol (DUO-NEB) nebulizer solution 3 mL  3 mL Nebulization Once Juwan Munoz MD   3 mL at 10/01/23 0711    ipratropium-albuterol (DUO-NEB) nebulizer solution 3 mL  3 mL Nebulization 4x Daily - RT Luz Rosales APRN   3 mL at 10/04/23 1513    ipratropium-albuterol (DUO-NEB) nebulizer solution 3 mL  3 mL Nebulization Q4H PRN Luz Rosales APRN        lisinopril (PRINIVIL,ZESTRIL) tablet 20 mg  20 mg Oral Q24H Michael Sandoval DO   20 mg at 10/04/23 0938    meloxicam (MOBIC) tablet 15 mg  15 mg Oral Daily Michael Sandoval DO   15 mg at 10/04/23 0938    [COMPLETED] methylPREDNISolone sodium succinate (SOLU-Medrol) injection 125 mg  125 mg Intravenous Once Juwan Munoz MD   125 mg at 10/01/23 0718    [COMPLETED] methylPREDNISolone sodium succinate (SOLU-Medrol) injection 60 mg  60 mg Intravenous Q6H Agustín Villarreal MD   60 mg at 10/03/23 0518    [COMPLETED] methylPREDNISolone sodium succinate (SOLU-Medrol) injection 60 mg  60 mg Intravenous Q6H Agustín Villarreal MD   60 mg at 10/04/23 0427    predniSONE (DELTASONE) tablet 40 mg  40 mg Oral BID With Meals Agustín Villarreal MD   40 mg at 10/04/23 0938    sodium chloride 0.9 % flush 10 mL  10 mL Intravenous PRN Juwan Munoz MD   10 mL at 10/01/23 2009     Lab Results (last 72 hours)       Procedure Component Value Units Date/Time    Basic Metabolic Panel [[id number]]  (Abnormal) Collected: 10/04/23 0836    Specimen: Blood Updated: 10/04/23 0854     Glucose 276 mg/dL      BUN 19 mg/dL      Creatinine 0.52 mg/dL      Sodium 132 mmol/L      Potassium 4.9 mmol/L      Chloride 94 mmol/L      CO2 28.6 mmol/L      Calcium 9.0 mg/dL      BUN/Creatinine Ratio 36.5     Anion  Gap 9.4 mmol/L      eGFR 105.9 mL/min/1.73     Narrative:      GFR Normal >60  Chronic Kidney Disease <60  Kidney Failure <15      Respiratory Panel PCR w/COVID-19(SARS-CoV-2) [facility]/WILIAM/DINORA/PAD/COR/MAD/JASON In-House, NP Swab in UTM/VTM, 3-4 HR TAT - Swab, Nasopharynx [[id number]]  (Abnormal) Collected: 10/02/23 1037    Specimen: Swab from Nasopharynx Updated: 10/02/23 1825     ADENOVIRUS, PCR Not Detected     Coronavirus 229E Not Detected     Coronavirus HKU1 Not Detected     Coronavirus NL63 Not Detected     Coronavirus OC43 Not Detected     COVID19 Not Detected     Human Metapneumovirus Not Detected     Human Rhinovirus/Enterovirus Detected     Influenza A PCR Not Detected     Influenza B PCR Not Detected     Parainfluenza Virus 1 Not Detected     Parainfluenza Virus 2 Not Detected     Parainfluenza Virus 3 Not Detected     Parainfluenza Virus 4 Not Detected     RSV, PCR Not Detected     Bordetella pertussis pcr Not Detected     Bordetella parapertussis PCR Not Detected     Chlamydophila pneumoniae PCR Not Detected     Mycoplasma pneumo by PCR Not Detected    Narrative:      In the setting of a positive respiratory panel with a viral infection PLUS a negative procalcitonin without other underlying concern for bacterial infection, consider observing off antibiotics or discontinuation of antibiotics and continue supportive care. If the respiratory panel is positive for atypical bacterial infection (Bordetella pertussis, Chlamydophila pneumoniae, or Mycoplasma pneumoniae), consider antibiotic de-escalation to target atypical bacterial infection.    POC Glucose Once [[id number]]  (Abnormal) Collected: 10/02/23 1219    Specimen: Blood Updated: 10/02/23 1224     Glucose 225 mg/dL     POC Glucose Once [[id number]]  (Abnormal) Collected: 10/02/23 0757    Specimen: Blood Updated: 10/02/23 0803     Glucose 243 mg/dL     Hemoglobin A1c [[id number]]  (Abnormal) Collected: 10/02/23 0353    Specimen: Blood Updated: 10/02/23 0450      Hemoglobin A1C 7.50 %     Narrative:      Hemoglobin A1C Ranges:    Increased Risk for Diabetes  5.7% to 6.4%  Diabetes                     >= 6.5%  Diabetic Goal                < 7.0%    Basic Metabolic Panel [[id number]]  (Abnormal) Collected: 10/02/23 0353    Specimen: Blood Updated: 10/02/23 0438     Glucose 266 mg/dL      BUN 17 mg/dL      Creatinine 0.49 mg/dL      Sodium 140 mmol/L      Potassium 4.7 mmol/L      Chloride 104 mmol/L      CO2 26.9 mmol/L      Calcium 8.7 mg/dL      BUN/Creatinine Ratio 34.7     Anion Gap 9.1 mmol/L      eGFR 107.4 mL/min/1.73     Narrative:      GFR Normal >60  Chronic Kidney Disease <60  Kidney Failure <15      CBC Auto Differential [[id number]]  (Abnormal) Collected: 10/02/23 0353    Specimen: Blood Updated: 10/02/23 0416     WBC 11.79 10*3/mm3      RBC 5.21 10*6/mm3      Hemoglobin 14.9 g/dL      Hematocrit 46.4 %      MCV 89.1 fL      MCH 28.6 pg      MCHC 32.1 g/dL      RDW 14.4 %      RDW-SD 46.2 fl      MPV 10.1 fL      Platelets 268 10*3/mm3      Neutrophil % 80.8 %      Lymphocyte % 11.1 %      Monocyte % 6.9 %      Eosinophil % 0.0 %      Basophil % 0.3 %      Immature Grans % 0.9 %      Neutrophils, Absolute 9.53 10*3/mm3      Lymphocytes, Absolute 1.31 10*3/mm3      Monocytes, Absolute 0.81 10*3/mm3      Eosinophils, Absolute 0.00 10*3/mm3      Basophils, Absolute 0.03 10*3/mm3      Immature Grans, Absolute 0.11 10*3/mm3      nRBC 0.0 /100 WBC     POC Glucose Once [[id number]]  (Abnormal) Collected: 10/01/23 2117    Specimen: Blood Updated: 10/01/23 2125     Glucose 337 mg/dL     POC Glucose Once [[id number]]  (Abnormal) Collected: 10/01/23 1709    Specimen: Blood Updated: 10/01/23 1717     Glucose 340 mg/dL           Imaging Results (Last 72 Hours)       ** No results found for the last 72 hours. **          ECG/EMG Results (last 72 hours)       ** No results found for the last 72 hours. **          Orders (last 72 hrs)        Start     Ordered    10/05/23 0900   insulin detemir (LEVEMIR) injection 60 Units  Daily,   Status:  Discontinued         10/04/23 0919    10/05/23 0600  Basic Metabolic Panel  Morning Draw         10/04/23 1331    10/04/23 1015  predniSONE (DELTASONE) tablet 40 mg  2 Times Daily With Meals         10/04/23 0919    10/04/23 1015  insulin detemir (LEVEMIR) injection 60 Units  Daily         10/04/23 0927    10/04/23 1006  calcium carbonate (TUMS) chewable tablet 500 mg (200 mg elemental)  3 Times Daily PRN         10/04/23 1006    10/04/23 0731  Basic Metabolic Panel  Once         10/04/23 0730    10/03/23 2100  insulin detemir (LEVEMIR) injection 25 Units  Nightly,   Status:  Discontinued         10/03/23 0736    10/03/23 0900  insulin detemir (LEVEMIR) injection 70 Units  Daily,   Status:  Discontinued         10/02/23 0918    10/03/23 0900  insulin detemir (LEVEMIR) injection 75 Units  Daily,   Status:  Discontinued         10/03/23 0736    10/03/23 0830  methylPREDNISolone sodium succinate (SOLU-Medrol) injection 60 mg  Every 6 Hours         10/03/23 0735    10/03/23 0800  ipratropium-albuterol (DUO-NEB) nebulizer solution 3 mL  4 Times Daily - RT         10/02/23 1937    10/03/23 0600  Document Pulse Oximetry - On Room Air / Home O2 Level  Daily      Comments: Room Air Oxygen Levels Should Only Be Measured if Patient Oxygen Needs are <4L  Home O2 Oxygen Levels Should Only Be Measured Once Patient Within 2L of Home O2 Baseline  Reapply Oxygen if O2 Sat Drops Below 88%    10/01/23 1034    10/02/23 1937  ipratropium-albuterol (DUO-NEB) nebulizer solution 3 mL  Every 4 Hours PRN         10/02/23 1937    10/02/23 1800  ferrous sulfate EC tablet 324 mg  2 Times Daily With Meals         10/02/23 1124    10/02/23 1700  POC Glucose TID AC  3 Times Daily Before Meals,   Status:  Canceled      Comments: Complete no more than 45 minutes prior to patient eating      10/02/23 1452    10/02/23 1545  insulin patient supplied pump  Continuous         10/02/23 3327     10/02/23 1451  Assess if the patient or caregiver is able to manage the insulin pump, every shift, and with any changes to the patient's status.  Every Shift       10/02/23 1452    10/02/23 1451  Have patient/caregiver complete the Insulin Pump Contract and place in patient chart.  Once         10/02/23 1452    10/02/23 1450  dextrose (GLUTOSE) oral gel 15 g  Every 15 Minutes PRN         10/02/23 1452    10/02/23 1450  dextrose (D50W) (25 g/50 mL) IV injection 25 g  Every 15 Minutes PRN         10/02/23 1452    10/02/23 1450  glucagon (GLUCAGEN) injection 1 mg  Every 15 Minutes PRN,   Status:  Discontinued         10/02/23 1452    10/02/23 1225  POC Glucose Once  PROCEDURE ONCE        Comments: Complete no more than 45 minutes prior to patient eating      10/02/23 1219    10/02/23 1215  ascorbic acid (VITAMIN C) tablet 500 mg  Daily         10/02/23 1124    10/02/23 1215  aspirin EC tablet 81 mg  Daily         10/02/23 1124    10/02/23 1215  atorvastatin (LIPITOR) tablet 40 mg  Daily         10/02/23 1124    10/02/23 1215  b complex-vitamin c-folic acid (NEPHRO-AMIE) tablet 1 tablet  Daily         10/02/23 1124    10/02/23 1215  cholecalciferol (VITAMIN D3) tablet 1,000 Units  Daily         10/02/23 1124    10/02/23 1215  estradiol (ESTRACE) tablet 2 mg  Daily         10/02/23 1124    10/02/23 1215  cetirizine (zyrTEC) tablet 10 mg  Daily         10/02/23 1124    10/02/23 1122  cyclobenzaprine (FLEXERIL) tablet 10 mg  Nightly PRN         10/02/23 1124    10/02/23 1122  busPIRone (BUSPAR) tablet 5 mg  3 Times Daily PRN         10/02/23 1124    10/02/23 0930  methylPREDNISolone sodium succinate (SOLU-Medrol) injection 60 mg  Every 6 Hours         10/02/23 0917    10/02/23 0917  Code Status and Medical Interventions:  Continuous         10/02/23 0916    10/02/23 0917  Respiratory Panel PCR w/COVID-19(SARS-CoV-2) [facility] In-House, NP Swab in UTM/VTM, 3-4 HR TAT - Swab, Nasopharynx  Once          10/02/23 0916    10/02/23 0900  Enoxaparin Sodium (LOVENOX) syringe 40 mg  Every 24 Hours         10/01/23 1122    10/02/23 0804  POC Glucose Once  PROCEDURE ONCE        Comments: Complete no more than 45 minutes prior to patient eating      10/02/23 0757    10/02/23 0600  Tobacco Cessation Education  Daily      Comments: Document in Education Activity    10/01/23 1034    10/02/23 0600  Respiratory Treatment Education (MDI / Spacer / Nebulizer)  Daily      Comments: Document in Education Activity    10/01/23 1034    10/02/23 0600  COPD Education  Daily      Comments: Document in Education Activity    10/01/23 1034    10/02/23 0600  Basic Metabolic Panel  Morning Draw         10/01/23 1034    10/02/23 0600  CBC Auto Differential  Morning Draw         10/01/23 1034    10/02/23 0600  Hemoglobin A1c  Morning Draw         10/01/23 1105    10/01/23 2126  POC Glucose Once  PROCEDURE ONCE        Comments: Complete no more than 45 minutes prior to patient eating      10/01/23 2117    10/01/23 2100  insulin detemir (LEVEMIR) injection 20 Units  Nightly,   Status:  Discontinued         10/01/23 1106    10/01/23 2100  atorvastatin (LIPITOR) tablet 40 mg  Nightly,   Status:  Discontinued         10/01/23 1107    10/01/23 1730  Insulin Aspart (novoLOG) injection 3-14 Units  4 Times Daily Before Meals & Nightly         10/01/23 1304    10/01/23 1718  POC Glucose Once  PROCEDURE ONCE        Comments: Complete no more than 45 minutes prior to patient eating      10/01/23 1709    10/01/23 1700  POC Glucose TID AC  3 Times Daily Before Meals,   Status:  Canceled      Comments: Complete no more than 45 minutes prior to patient eating      10/01/23 1304    10/01/23 1400  Incentive Spirometry  Every 4 Hours While Awake       10/01/23 1123    10/01/23 1303  Follow Hypoglycemia Standing Orders For Blood Glucose <70 & Notify Provider of Treatment  As Needed,   Status:  Canceled      Comments: Follow Hypoglycemia Orders As Outlined in  Process Instructions (Open Order Report to View Full Instructions)  Notify Provider Any Time Hypoglycemia Treatment is Administered    10/01/23 1304    10/01/23 1303  dextrose (GLUTOSE) oral gel 15 g  Every 15 Minutes PRN         10/01/23 1304    10/01/23 1303  dextrose (D50W) (25 g/50 mL) IV injection 25 g  Every 15 Minutes PRN         10/01/23 1304    10/01/23 1300  ipratropium-albuterol (DUO-NEB) nebulizer solution 3 mL  Every 6 Hours - RT,   Status:  Discontinued         10/01/23 1034    10/01/23 1200  Cough / Deep Breathe  Every 4 Hours       10/01/23 1034    10/01/23 1200  insulin detemir (LEVEMIR) injection 60 Units  Daily,   Status:  Discontinued         10/01/23 1106    10/01/23 1200  lisinopril (PRINIVIL,ZESTRIL) tablet 20 mg  Every 24 Hours Scheduled         10/01/23 1107    10/01/23 1200  meloxicam (MOBIC) tablet 15 mg  Daily         10/01/23 1107    10/01/23 1102  POC Glucose PRN  As Needed,   Status:  Canceled      Comments: Complete no more than 45 minutes prior to patient eating      10/01/23 1105    10/01/23 1102  Treat Hypoglycemia As Recommended By [employer]™ & Notify Provider of Treatment  As Needed,   Status:  Canceled      Comments: Follow Hypoglycemia Orders As Outlined in Process Instructions (Open Order Report to View Full Instructions)  Notify Provider Any Time Hypoglycemia Treatment is Administered    10/01/23 1105    10/01/23 1050  methylPREDNISolone sodium succinate (SOLU-Medrol) injection 40 mg  Every 8 Hours,   Status:  Discontinued         10/01/23 1034    10/01/23 1050  guaiFENesin (MUCINEX) 12 hr tablet 600 mg  Every 12 Hours Scheduled         10/01/23 1034    10/01/23 1034  benzonatate (TESSALON) capsule 200 mg  3 Times Daily PRN         10/01/23 1034    10/01/23 1033  albuterol (PROVENTIL) nebulizer solution 0.083% 2.5 mg/3mL  Every 4 Hours PRN         10/01/23 1034    10/01/23 0700  sodium chloride 0.9 % flush 10 mL  As Needed        See Hyperspace for full Linked Orders  Report.    10/01/23 0701    Unscheduled  Oxygen Therapy- Nasal Cannula; Titrate 1-6 LPM Per SpO2; 88 - 92%  Continuous PRN       10/02/23 0610    Unscheduled  Follow Hypoglycemia Standing Orders For Blood Glucose <70 & Notify Provider of Treatment  As Needed      Comments: Follow Hypoglycemia Orders As Outlined in Process Instructions (Open Order Report to View Full Instructions)  Notify Provider Any Time Hypoglycemia Treatment is Administered    10/02/23 1452    --  Mirabegron ER (MYRBETRIQ) 25 MG tablet sustained-release 24 hour 24 hr tablet  Daily         10/01/23 1207    --  estradiol (ESTRACE) 2 MG tablet  Daily         10/01/23 1207    --  meloxicam (MOBIC) 15 MG tablet  Daily         10/01/23 1207    --  atorvastatin (LIPITOR) 40 MG tablet  Daily         10/01/23 1207    --  Insulin Glargine (BASAGLAR KWIKPEN SC)  Daily         10/01/23 1207    --  lisinopril (PRINIVIL,ZESTRIL) 10 MG tablet  Daily         10/01/23 1207    --  ferrous sulfate 325 (65 FE) MG tablet  2 Times Daily With Meals         10/01/23 1207    --  cyclobenzaprine (FLEXERIL) 10 MG tablet  Nightly PRN         10/01/23 1207    --  busPIRone (BUSPAR) 5 MG tablet  3 Times Daily PRN         10/01/23 1207    --  ALBUTEROL SULFATE IN  Every 6 Hours PRN         10/01/23 1207    --  zolpidem (Ambien) 10 MG tablet  Nightly PRN         10/01/23 1207    --  levocetirizine (XYZAL) 5 MG tablet  Every Evening         10/01/23 1207    --  aspirin 81 MG EC tablet  Daily         10/01/23 1207    --  ipratropium-albuterol (DUO-NEB) 0.5-2.5 mg/3 ml nebulizer  Every 4 Hours PRN         10/01/23 1207    --  Plecanatide (Trulance) 3 MG tablet  Daily PRN         10/01/23 1207    --  furosemide (LASIX) 20 MG tablet  Daily PRN         10/01/23 1207    --  B Complex Vitamins (vitamin b complex) capsule capsule  Daily         10/01/23 1207    --  Ascorbic Acid (Vitamin C) 500 MG capsule  Daily         10/01/23 5097    --  cholecalciferol (VITAMIN D3) 1.25 MG (33322  "UT) capsule  Every 7 Days         10/01/23 1207    --  Cholecalciferol 25 MCG (1000 UT) tablet  Daily         10/01/23 1207                  Operative/Procedure Notes (last 72 hours)  Notes from 10/01/23 1524 through 10/04/23 1524   No notes of this type exist for this encounter.          Physician Progress Notes (last 72 hours)        Agustín Villarreal MD at 10/04/23 0920          Hospitalist Team      Patient Care Team:  Francesca Adrian PA as PCP - General (Physician Assistant)      Chief Complaint: Follow-up [facility]OP due to Rhinovirus    Subjective    Ms. Nath is feeling much better this morning and she is breathing much better too.  She's a little frustrated she's still on O2, but weaning as able.  She was out in the [employer] last evening and very much enjoyed it.  She denies chest pain.  Tolerating PO.    Objective    Vital Signs  Temp:  [97.6 °F (36.4 °C)-98.6 °F (37 °C)] 98.6 °F (37 °C)  Heart Rate:  [70-98] 84  Resp:  [18-20] 20  BP: (128-154)/(73-89) 151/85  Oxygen Therapy  SpO2: 92 %  Pulse Oximetry Type: Continuous  Device (Oxygen Therapy): nasal cannula  Flow (L/min): 2}    Flowsheet Rows      Flowsheet Row First Filed Value   Admission Height 177.8 cm (70\") Documented at 10/01/2023 1103   Admission Weight 95.2 kg (209 lb 14.1 oz) Documented at 10/01/2023 1103            Physical Exam:    General: Appears stated age in no acute distress.  Lungs: Breath sounds are diminished throughout all fields, but wheezes are gone.  Respirations are non-labored.  CV: Regular rate and rhythm.  No murmurs appreciated.  Radial pulses are 2+ and symmetric.  Abdomen: Obese, soft, and non-tender w/ active bowel sounds.  MSK: No C/C/E.  Neuro: CN II-XII grossly intact.  Psych: Pleasant affect.  Ox3.    Results Review:     I reviewed the patient's new clinical results.    Lab Results (last 24 hours)       Procedure Component Value Units Date/Time    Basic Metabolic Panel [[id number]]  (Abnormal) Collected: 10/04/23 0836 "    Specimen: Blood Updated: 10/04/23 0854     Glucose 276 mg/dL      BUN 19 mg/dL      Creatinine 0.52 mg/dL      Sodium 132 mmol/L      Potassium 4.9 mmol/L      Chloride 94 mmol/L      CO2 28.6 mmol/L      Calcium 9.0 mg/dL      BUN/Creatinine Ratio 36.5     Anion Gap 9.4 mmol/L      eGFR 105.9 mL/min/1.73     Narrative:      GFR Normal >60  Chronic Kidney Disease <60  Kidney Failure <15              Imaging Results (Last 24 Hours)       ** No results found for the last 24 hours. **            Medication Review:   I have reviewed the patient's current medication list    Current Facility-Administered Medications:     albuterol (PROVENTIL) nebulizer solution 0.083% 2.5 mg/3mL, 2.5 mg, Nebulization, Q4H PRN, Michael Sandoval DO    ascorbic acid (VITAMIN C) tablet 500 mg, 500 mg, Oral, Daily, Agustín Villarreal MD, 500 mg at 10/03/23 0932    aspirin EC tablet 81 mg, 81 mg, Oral, Daily, Agustín Villarreal MD, 81 mg at 10/03/23 0932    atorvastatin (LIPITOR) tablet 40 mg, 40 mg, Oral, Daily, Agustín Villarreal MD, 40 mg at 10/03/23 0932    b complex-vitamin c-folic acid (NEPHRO-AMIE) tablet 1 tablet, 1 tablet, Oral, Daily, Agustín Villarreal MD, 1 tablet at 10/03/23 0932    benzonatate (TESSALON) capsule 200 mg, 200 mg, Oral, TID PRN, Michael Sandoval DO, 200 mg at 10/03/23 0932    busPIRone (BUSPAR) tablet 5 mg, 5 mg, Oral, TID PRN, Agustín Villarreal MD, 5 mg at 10/03/23 1756    cetirizine (zyrTEC) tablet 10 mg, 10 mg, Oral, Daily, Agustín Villarreal MD, 10 mg at 10/03/23 0932    cholecalciferol (VITAMIN D3) tablet 1,000 Units, 1,000 Units, Oral, Daily, Agustín Villarreal MD, 1,000 Units at 10/03/23 0932    cyclobenzaprine (FLEXERIL) tablet 10 mg, 10 mg, Oral, Nightly PRN, Agustín Villarreal MD    dextrose (D50W) (25 g/50 mL) IV injection 25 g, 25 g, Intravenous, Q15 Min PRN, Michael Sandoval DO    dextrose (D50W) (25 g/50 mL) IV injection 25 g, 25 g, Intravenous, Q15 Min PRN, Agustín Villarreal MD    dextrose (GLUTOSE) oral gel 15 g, 15 g, Oral,  Q15 Min PRN, Michael Sandoval DO    dextrose (GLUTOSE) oral gel 15 g, 15 g, Oral, Q15 Min PRN, Agustín Villarreal MD    Enoxaparin Sodium (LOVENOX) syringe 40 mg, 40 mg, Subcutaneous, Q24H, Michael Sandoval DO, 40 mg at 10/02/23 0955    estradiol (ESTRACE) tablet 2 mg, 2 mg, Oral, Daily, Agustín Villarreal MD, 2 mg at 10/03/23 0932    ferrous sulfate EC tablet 324 mg, 324 mg, Oral, BID With Meals, Agustín Villarreal MD, 324 mg at 10/03/23 1746    guaiFENesin (MUCINEX) 12 hr tablet 600 mg, 600 mg, Oral, Q12H, Michael Sandoval DO, 600 mg at 10/03/23 2029    Insulin Aspart (novoLOG) injection 3-14 Units, 3-14 Units, Subcutaneous, 4x Daily AC & at Bedtime, Michael Sandoval DO, 10 Units at 10/03/23 2033    insulin detemir (LEVEMIR) injection 25 Units, 25 Units, Subcutaneous, Nightly, Agustín Villarreal MD, 25 Units at 10/03/23 2033    [START ON 10/5/2023] insulin detemir (LEVEMIR) injection 60 Units, 60 Units, Subcutaneous, Daily, Agustín Villarreal MD    insulin patient supplied pump, , Subcutaneous, Continuous, Agustín Villarreal MD    ipratropium-albuterol (DUO-NEB) nebulizer solution 3 mL, 3 mL, Nebulization, 4x Daily - RT, Luz Rosales APRN, 3 mL at 10/04/23 0754    ipratropium-albuterol (DUO-NEB) nebulizer solution 3 mL, 3 mL, Nebulization, Q4H PRN, Luz Rosales APRN    lisinopril (PRINIVIL,ZESTRIL) tablet 20 mg, 20 mg, Oral, Q24H, Michael Sandoval DO, 20 mg at 10/03/23 0932    meloxicam (MOBIC) tablet 15 mg, 15 mg, Oral, Daily, Michael Sandoval DO, 15 mg at 10/03/23 0932    predniSONE (DELTASONE) tablet 40 mg, 40 mg, Oral, BID With Meals, Agustín Villarreal MD    [COMPLETED] Insert Peripheral IV, , , Once **AND** sodium chloride 0.9 % flush 10 mL, 10 mL, Intravenous, PRN, Juwan Munoz MD, 10 mL at 10/01/23 2009      Assessment & Plan     Acute Exacerbation of COPD due to Acute Rhinovirus Infection w/ Hypoxia: Clinically better today.  Lung exam is much improved.  I'm going to change her to PO Prednisone.  Continue nebs and  "wean O2 as able.  Will need a Walking Ox near discharge.  I wouldn't be surprised if she needs a little O2 w/ exertion.  Encouraged continued ambulation  Diabetes Mellitus, Type 2 in Obese: A1c was 7.5%.  I've adjusted her insulin regimen given the change to PO steroids.  Expect trend to improve.  Anxiety: No acute issues.  Continue BuSpar.  Essential Hypertension: Not at goal on exam, and trend is up.  Will monitor closely over the next 24 hours.  May need adjustment in therapy.  Dyslipidemia: Continue Lipitor.  Pulmonary Nodule: Outpatient follow-up.    Plan for disposition: Home when able.    Agustín Villarreal MD  10/04/23  09:20 EDT    Electronically signed by Agustín Villarreal MD at 10/04/23 1315       Agustín Villarreal MD at 10/03/23 0963          Hospitalist Team      Patient Care Team:  Francesca Adrian PA as PCP - General (Physician Assistant)      Chief Complaint:  Follow-up [facility]OPD due to Acute Rhinovirus Infection    Subjective    Feeling much better this morning!  Ms. Nath reports she felt good enough to get herself cleaned up.  She denies chest pain, but still notes her SaO2 drop w/ any activity.  She is tolerating PO.    Objective    Vital Signs  Temp:  [98.1 °F (36.7 °C)-98.5 °F (36.9 °C)] 98.2 °F (36.8 °C)  Heart Rate:  [65-94] 74  Resp:  [18-24] 20  BP: (110-141)/(50-82) 141/82  Oxygen Therapy  SpO2: 92 %  Pulse Oximetry Type: Continuous  Device (Oxygen Therapy): nasal cannula  Flow (L/min): 2}    Flowsheet Rows      Flowsheet Row First Filed Value   Admission Height 177.8 cm (70\") Documented at 10/01/2023 1103   Admission Weight 95.2 kg (209 lb 14.1 oz) Documented at 10/01/2023 1103          Physical Exam:    General: Appears in no acute distress.  Lungs: Coarse expiratory wheezes are noted throughout all fields.  Respirations are non-labored.  CV: Regular rate and rhythm.  No murmurs appreciated.  Radial and pedal pulses are 2+ and symmetric.  Abdomen: Obese, soft, and non-tender w/ active bowel " sounds.  MSK: No C/C/E.  Neuro: CN II-XII grossly intact.  Psych: Pleasant affect.  Ox3.    Results Review:     I reviewed the patient's new clinical results.    Lab Results (last 24 hours)       Procedure Component Value Units Date/Time    Respiratory Panel PCR w/COVID-19(SARS-CoV-2) [facility] In-House, NP Swab in UTM/VTM, 3-4 HR TAT - Swab, Nasopharynx [[id number]]  (Abnormal) Collected: 10/02/23 1037    Specimen: Swab from Nasopharynx Updated: 10/02/23 1825     ADENOVIRUS, PCR Not Detected     Coronavirus 229E Not Detected     Coronavirus HKU1 Not Detected     Coronavirus NL63 Not Detected     Coronavirus OC43 Not Detected     COVID19 Not Detected     Human Metapneumovirus Not Detected     Human Rhinovirus/Enterovirus Detected     Influenza A PCR Not Detected     Influenza B PCR Not Detected     Parainfluenza Virus 1 Not Detected     Parainfluenza Virus 2 Not Detected     Parainfluenza Virus 3 Not Detected     Parainfluenza Virus 4 Not Detected     RSV, PCR Not Detected     Bordetella pertussis pcr Not Detected     Bordetella parapertussis PCR Not Detected     Chlamydophila pneumoniae PCR Not Detected     Mycoplasma pneumo by PCR Not Detected    Narrative:      In the setting of a positive respiratory panel with a viral infection PLUS a negative procalcitonin without other underlying concern for bacterial infection, consider observing off antibiotics or discontinuation of antibiotics and continue supportive care. If the respiratory panel is positive for atypical bacterial infection (Bordetella pertussis, Chlamydophila pneumoniae, or Mycoplasma pneumoniae), consider antibiotic de-escalation to target atypical bacterial infection.    POC Glucose Once [[id number]]  (Abnormal) Collected: 10/02/23 1219    Specimen: Blood Updated: 10/02/23 1224     Glucose 225 mg/dL             Imaging Results (Last 24 Hours)       ** No results found for the last 24 hours. **              Medication Review:   I have  reviewed the patient's current medication list    Current Facility-Administered Medications:     albuterol (PROVENTIL) nebulizer solution 0.083% 2.5 mg/3mL, 2.5 mg, Nebulization, Q4H PRN, Michael Sandoval DO    ascorbic acid (VITAMIN C) tablet 500 mg, 500 mg, Oral, Daily, Agustín Villarreal MD, 500 mg at 10/03/23 0932    aspirin EC tablet 81 mg, 81 mg, Oral, Daily, Agustín Villarreal MD, 81 mg at 10/03/23 0932    atorvastatin (LIPITOR) tablet 40 mg, 40 mg, Oral, Daily, Agustín Villarreal MD, 40 mg at 10/03/23 0932    b complex-vitamin c-folic acid (NEPHRO-AMIE) tablet 1 tablet, 1 tablet, Oral, Daily, Agustín Villarreal MD, 1 tablet at 10/03/23 0932    benzonatate (TESSALON) capsule 200 mg, 200 mg, Oral, TID PRN, Michael Sandoval DO, 200 mg at 10/03/23 0932    busPIRone (BUSPAR) tablet 5 mg, 5 mg, Oral, TID PRN, Agustín Villarreal MD, 5 mg at 10/03/23 0932    cetirizine (zyrTEC) tablet 10 mg, 10 mg, Oral, Daily, Agustín Villarreal MD, 10 mg at 10/03/23 0932    cholecalciferol (VITAMIN D3) tablet 1,000 Units, 1,000 Units, Oral, Daily, Agustín Villarreal MD, 1,000 Units at 10/03/23 0932    cyclobenzaprine (FLEXERIL) tablet 10 mg, 10 mg, Oral, Nightly PRN, Agustín Villarreal MD    dextrose (D50W) (25 g/50 mL) IV injection 25 g, 25 g, Intravenous, Q15 Min PRN, Michael Sandoval DO    dextrose (D50W) (25 g/50 mL) IV injection 25 g, 25 g, Intravenous, Q15 Min PRN, Agustín Villarreal MD    dextrose (GLUTOSE) oral gel 15 g, 15 g, Oral, Q15 Min PRN, Michael Sandoval DO    dextrose (GLUTOSE) oral gel 15 g, 15 g, Oral, Q15 Min PRN, Agustín Villarreal MD    Enoxaparin Sodium (LOVENOX) syringe 40 mg, 40 mg, Subcutaneous, Q24H, Michael Sandoval DO, 40 mg at 10/02/23 0955    estradiol (ESTRACE) tablet 2 mg, 2 mg, Oral, Daily, Agustín Villarreal MD, 2 mg at 10/03/23 0932    ferrous sulfate EC tablet 324 mg, 324 mg, Oral, BID With Meals, Agustín Villarreal MD, 324 mg at 10/03/23 0932    guaiFENesin (MUCINEX) 12 hr tablet 600 mg, 600 mg, Oral, Q12H, Michael Sandoval DO, 600 mg  at 10/03/23 0932    Insulin Aspart (novoLOG) injection 3-14 Units, 3-14 Units, Subcutaneous, 4x Daily AC & at Bedtime, Michael Sandoval DO, 10 Units at 10/03/23 0815    insulin detemir (LEVEMIR) injection 25 Units, 25 Units, Subcutaneous, Nightly, Agustín Villarreal MD    insulin detemir (LEVEMIR) injection 75 Units, 75 Units, Subcutaneous, Daily, Agustín Villarreal MD, 75 Units at 10/03/23 0932    insulin patient supplied pump, , Subcutaneous, Continuous, Agustín Villarreal MD    ipratropium-albuterol (DUO-NEB) nebulizer solution 3 mL, 3 mL, Nebulization, 4x Daily - RT, Luz Rosales APRN, 3 mL at 10/03/23 0718    ipratropium-albuterol (DUO-NEB) nebulizer solution 3 mL, 3 mL, Nebulization, Q4H PRN, Luz Rosales APRN    lisinopril (PRINIVIL,ZESTRIL) tablet 20 mg, 20 mg, Oral, Q24H, Michael Sandoval DO, 20 mg at 10/03/23 0932    meloxicam (MOBIC) tablet 15 mg, 15 mg, Oral, Daily, Michael Sandoval DO, 15 mg at 10/03/23 0932    methylPREDNISolone sodium succinate (SOLU-Medrol) injection 60 mg, 60 mg, Intravenous, Q6H, Agustín Villarreal MD, 60 mg at 10/03/23 0933    [COMPLETED] Insert Peripheral IV, , , Once **AND** sodium chloride 0.9 % flush 10 mL, 10 mL, Intravenous, PRN, Juwan Munoz MD, 10 mL at 10/01/23 2009      Assessment & Plan     Acute Exacerbation of COPD due to Acute Rhinovirus Infection w/ Hypoxia: Appears clinically better, but exam is unchanged.  Will burst her w/ another day of IV solumedrol.  Continue nebs.  Check Mag and Phos in A.M.  Diabetes Mellitus, Type 2: A1c near goal at 7.5%.  Glucose trend a little better, but not at goal.  I've increased her basal insulin a little more.  Continue to monitor.  Anxiety: Much better today.  Continue BuSpar.  Essential Hypertension: Near goal on exam, but trend is good.  Continue to monitor.    Plan for disposition: Home when able.    Agustín Villarreal MD  10/03/23  09:51 EDT    Electronically signed by Agustín Villarreal MD at 10/03/23 5994       Agustín Villarreal,  "MD at 10/02/23 0918          Hospitalist Team      Patient Care Team:  Provider, No Known as PCP - General      Chief Complaint:  Follow-up AECOPD    Subjective    Feels a little better this morning, but she reports she desaturated to 82% as she was up to brush her teeth.  She reports no dyspnea at rest.  She denies chest pain.  Tolerating PO.    Objective    Vital Signs  Temp:  [97.9 °F (36.6 °C)-98.3 °F (36.8 °C)] 98.3 °F (36.8 °C)  Heart Rate:  [77-95] 78  Resp:  [16-22] 16  BP: (120-141)/(64-77) 120/66  Oxygen Therapy  SpO2: 92 %  Pulse Oximetry Type: Continuous  Device (Oxygen Therapy): nasal cannula  Flow (L/min): 2}    Flowsheet Rows      Flowsheet Row First Filed Value   Admission Height 177.8 cm (70\") Documented at 10/01/2023 1103   Admission Weight 95.2 kg (209 lb 14.1 oz) Documented at 10/01/2023 1103          Physical Exam:    General: Appears stated age in no acute distress.  Lungs: Diminished breath sounds w/ scattered expiratory wheezes and squeeks.  CV: Regular rate and rhythm.  No murmur appreciated.  Radial pulses are 2+ and symmetric.  Abdomen: Obese, soft, and non-tender w/ active bowel sounds.  MSK: No C/C/E.  Neuro: CN II-XII grossly intact.  Psych: Normal affect.  Ox3.    Results Review:     I reviewed the patient's new clinical results.    Lab Results (last 24 hours)       Procedure Component Value Units Date/Time    POC Glucose Once [[id number]]  (Abnormal) Collected: 10/02/23 0757    Specimen: Blood Updated: 10/02/23 0803     Glucose 243 mg/dL     Hemoglobin A1c [[id number]]  (Abnormal) Collected: 10/02/23 0353    Specimen: Blood Updated: 10/02/23 0450     Hemoglobin A1C 7.50 %     Narrative:      Hemoglobin A1C Ranges:    Increased Risk for Diabetes  5.7% to 6.4%  Diabetes                     >= 6.5%  Diabetic Goal                < 7.0%    Basic Metabolic Panel [[id number]]  (Abnormal) Collected: 10/02/23 0353    Specimen: Blood Updated: 10/02/23 0438     Glucose 266 mg/dL      BUN 17 mg/dL  "     Creatinine 0.49 mg/dL      Sodium 140 mmol/L      Potassium 4.7 mmol/L      Chloride 104 mmol/L      CO2 26.9 mmol/L      Calcium 8.7 mg/dL      BUN/Creatinine Ratio 34.7     Anion Gap 9.1 mmol/L      eGFR 107.4 mL/min/1.73     Narrative:      GFR Normal >60  Chronic Kidney Disease <60  Kidney Failure <15      CBC Auto Differential [[id number]]  (Abnormal) Collected: 10/02/23 0353    Specimen: Blood Updated: 10/02/23 0416     WBC 11.79 10*3/mm3      RBC 5.21 10*6/mm3      Hemoglobin 14.9 g/dL      Hematocrit 46.4 %      MCV 89.1 fL      MCH 28.6 pg      MCHC 32.1 g/dL      RDW 14.4 %      RDW-SD 46.2 fl      MPV 10.1 fL      Platelets 268 10*3/mm3      Neutrophil % 80.8 %      Lymphocyte % 11.1 %      Monocyte % 6.9 %      Eosinophil % 0.0 %      Basophil % 0.3 %      Immature Grans % 0.9 %      Neutrophils, Absolute 9.53 10*3/mm3      Lymphocytes, Absolute 1.31 10*3/mm3      Monocytes, Absolute 0.81 10*3/mm3      Eosinophils, Absolute 0.00 10*3/mm3      Basophils, Absolute 0.03 10*3/mm3      Immature Grans, Absolute 0.11 10*3/mm3      nRBC 0.0 /100 WBC     POC Glucose Once [[id number]]  (Abnormal) Collected: 10/01/23 2117    Specimen: Blood Updated: 10/01/23 2125     Glucose 337 mg/dL     POC Glucose Once [[id number]]  (Abnormal) Collected: 10/01/23 1709    Specimen: Blood Updated: 10/01/23 1717     Glucose 340 mg/dL     POC Glucose Once [[id number]]  (Abnormal) Collected: 10/01/23 1220    Specimen: Blood Updated: 10/01/23 1236     Glucose 247 mg/dL             Imaging Results (Last 24 Hours)       Procedure Component Value Units Date/Time    CT Angiogram Chest [[id number]] Collected: 10/01/23 0919     Updated: 10/01/23 0926    Narrative:      CT ANGIOGRAM CHEST-10/1/2023     INDICATION:  Worsening shortness of air and cough. COVID diagnosis 8 days ago.     TECHNIQUE:  CT angiogram of the chest with IV contrast. 3-D reconstructions were  obtained and reviewed.   Radiation dose reduction techniques  included  automated exposure control or exposure modulation based on body size.  Count of known CT and cardiac nuc med studies performed in previous 12  months: 0.     COMPARISON:  None available.     FINDINGS:  Adequate opacification of the pulmonary arteries with no filling  defects. Thoracic aorta normal in course and caliber without dissection.  Heart size is normal. No pericardial effusion.     No pleural effusion. No pneumothorax. No focal pulmonary infiltrates. 5  mm noncalcified pulmonary nodule in the left lower lobe (image 87).     Visualized upper abdomen is unremarkable.     No acute osseous abnormality.       Impression:      1. Negative for pulmonary embolus.  2. Negative for thoracic aortic aneurysm/dissection.  3. No acute pulmonary process.  4. 5 mm noncalcified pulmonary nodule in the left lower lobe. Management  recommendation: Based on current published guidelines, uncomplicated  pulmonary nodules less than 6 mm do not require CT follow-up. In high  risk patients, follow-up CT in 12 months is optional. ([employer] guidelines, 2017).     This report was finalized on 10/1/2023 9:24 AM by Dr. Kevin Mendez MD.               X-ray reviewed personally by physician.        Medication Review:   I have reviewed the patient's current medication list    Current Facility-Administered Medications:     albuterol (PROVENTIL) nebulizer solution 0.083% 2.5 mg/3mL, 2.5 mg, Nebulization, Q4H PRN, Michael Sandoval DO    atorvastatin (LIPITOR) tablet 40 mg, 40 mg, Oral, Nightly, Michael Sandoval DO, 40 mg at 10/01/23 2009    benzonatate (TESSALON) capsule 200 mg, 200 mg, Oral, TID PRN, Michael Sandoval DO, 200 mg at 10/01/23 2009    dextrose (D50W) (25 g/50 mL) IV injection 25 g, 25 g, Intravenous, Q15 Min PRN, Michael Sandoval DO    dextrose (GLUTOSE) oral gel 15 g, 15 g, Oral, Q15 Min PRN, Michael Sandoval DO    Enoxaparin Sodium (LOVENOX) syringe 40 mg, 40 mg, Subcutaneous, Q24H, Michael Sandoval,      guaiFENesin (MUCINEX) 12 hr tablet 600 mg, 600 mg, Oral, Q12H, Michael Sandoval DO, 600 mg at 10/01/23 2009    Insulin Aspart (novoLOG) injection 3-14 Units, 3-14 Units, Subcutaneous, 4x Daily AC & at Bedtime, Michael Sandoval DO, 10 Units at 10/01/23 2125    insulin detemir (LEVEMIR) injection 20 Units, 20 Units, Subcutaneous, Nightly, Michael Sandoval DO, 20 Units at 10/01/23 2124    [START ON 10/3/2023] insulin detemir (LEVEMIR) injection 70 Units, 70 Units, Subcutaneous, Daily, Agustín Villarreal MD    ipratropium-albuterol (DUO-NEB) nebulizer solution 3 mL, 3 mL, Nebulization, Q6H - RT, Michael Sandoval DO, 3 mL at 10/02/23 0711    lisinopril (PRINIVIL,ZESTRIL) tablet 20 mg, 20 mg, Oral, Q24H, Michael Sandoval DO, 20 mg at 10/01/23 1339    meloxicam (MOBIC) tablet 15 mg, 15 mg, Oral, Daily, Micahel Sandoval DO, 15 mg at 10/01/23 1339    methylPREDNISolone sodium succinate (SOLU-Medrol) injection 60 mg, 60 mg, Intravenous, Q6H, Agustín Villarreal MD    [COMPLETED] Insert Peripheral IV, , , Once **AND** sodium chloride 0.9 % flush 10 mL, 10 mL, Intravenous, PRN, Juwan Munoz MD, 10 mL at 10/01/23 2009      Assessment & Plan     AECOPD: Will increase steroid frequency and dose today.  Continue nebs.  I will check a vitral panel to exclude RSV.  CXR and CT negative for acute process.  Diabetes Mellitus, Type 2 in Obese: A1c near goal at 7.5%.  Increased basal insulin dose given blood glucose trend and change in steroid dose and freqeuncy.  Anxiety: resume home BuSpar.  Essential Hypertension: At goal on exam.  Trend is good.  No change to current regimen.    Plan for disposition: Predicated on hospital course.    Agustín Villarreal MD  10/02/23  09:18 EDT    Electronically signed by Agustín Villarreal MD at 10/02/23 1125       Consult Notes (last 72 hours)  Notes from 10/01/23 1524 through 10/04/23 1524   No notes of this type exist for this encounter.

## 2023-10-04 NOTE — PLAN OF CARE
Goal Outcome Evaluation:  Plan of Care Reviewed With: patient        Progress: improving  Outcome Evaluation: Pt between 1-2L today. Oxygen level drops with excertion/activity. Pt up ambulating hallway and out to garden today, encouraged pt to use oxygen when up. Continues cont. pulse ox. VSS.

## 2023-10-04 NOTE — PROGRESS NOTES
"Hospitalist Team      Patient Care Team:  Francesca Adrian PA as PCP - General (Physician Assistant)      Chief Complaint: Follow-up AECOP due to Rhinovirus    Subjective    Ms. Nath is feeling much better this morning and she is breathing much better too.  She's a little frustrated she's still on O2, but weaning as able.  She was out in the [facility] last evening and very much enjoyed it.  She denies chest pain.  Tolerating PO.    Objective    Vital Signs  Temp:  [97.6 °F (36.4 °C)-98.6 °F (37 °C)] 98.6 °F (37 °C)  Heart Rate:  [70-98] 84  Resp:  [18-20] 20  BP: (128-154)/(73-89) 151/85  Oxygen Therapy  SpO2: 92 %  Pulse Oximetry Type: Continuous  Device (Oxygen Therapy): nasal cannula  Flow (L/min): 2}    Flowsheet Rows      Flowsheet Row First Filed Value   Admission Height 177.8 cm (70\") Documented at 10/01/2023 1103   Admission Weight 95.2 kg (209 lb 14.1 oz) Documented at 10/01/2023 1103            Physical Exam:    General: Appears stated age in no acute distress.  Lungs: Breath sounds are diminished throughout all fields, but wheezes are gone.  Respirations are non-labored.  CV: Regular rate and rhythm.  No murmurs appreciated.  Radial pulses are 2+ and symmetric.  Abdomen: Obese, soft, and non-tender w/ active bowel sounds.  MSK: No C/C/E.  Neuro: CN II-XII grossly intact.  Psych: Pleasant affect.  Ox3.    Results Review:     I reviewed the patient's new clinical results.    Lab Results (last 24 hours)       Procedure Component Value Units Date/Time    Basic Metabolic Panel [[id number]]  (Abnormal) Collected: 10/04/23 0836    Specimen: Blood Updated: 10/04/23 0854     Glucose 276 mg/dL      BUN 19 mg/dL      Creatinine 0.52 mg/dL      Sodium 132 mmol/L      Potassium 4.9 mmol/L      Chloride 94 mmol/L      CO2 28.6 mmol/L      Calcium 9.0 mg/dL      BUN/Creatinine Ratio 36.5     Anion Gap 9.4 mmol/L      eGFR 105.9 mL/min/1.73     Narrative:      GFR Normal >60  Chronic Kidney Disease <60  Kidney " Failure <15              Imaging Results (Last 24 Hours)       ** No results found for the last 24 hours. **            Medication Review:   I have reviewed the patient's current medication list    Current Facility-Administered Medications:     albuterol (PROVENTIL) nebulizer solution 0.083% 2.5 mg/3mL, 2.5 mg, Nebulization, Q4H PRN, Michael Sandoval DO    ascorbic acid (VITAMIN C) tablet 500 mg, 500 mg, Oral, Daily, Agustín Villarreal MD, 500 mg at 10/03/23 0932    aspirin EC tablet 81 mg, 81 mg, Oral, Daily, Agustín Villarreal MD, 81 mg at 10/03/23 0932    atorvastatin (LIPITOR) tablet 40 mg, 40 mg, Oral, Daily, Agustín Villarreal MD, 40 mg at 10/03/23 0932    b complex-vitamin c-folic acid (NEPHRO-AMIE) tablet 1 tablet, 1 tablet, Oral, Daily, Agustín Villarreal MD, 1 tablet at 10/03/23 0932    benzonatate (TESSALON) capsule 200 mg, 200 mg, Oral, TID PRN, Michael Sandoval DO, 200 mg at 10/03/23 0932    busPIRone (BUSPAR) tablet 5 mg, 5 mg, Oral, TID PRN, Agustín Villarreal MD, 5 mg at 10/03/23 1756    cetirizine (zyrTEC) tablet 10 mg, 10 mg, Oral, Daily, Agustín Villarreal MD, 10 mg at 10/03/23 0932    cholecalciferol (VITAMIN D3) tablet 1,000 Units, 1,000 Units, Oral, Daily, Agustín Villarreal MD, 1,000 Units at 10/03/23 0932    cyclobenzaprine (FLEXERIL) tablet 10 mg, 10 mg, Oral, Nightly PRN, Agustín Villarreal MD    dextrose (D50W) (25 g/50 mL) IV injection 25 g, 25 g, Intravenous, Q15 Min PRN, Michael Sandoval DO    dextrose (D50W) (25 g/50 mL) IV injection 25 g, 25 g, Intravenous, Q15 Min PRN, Agustín Villarreal MD    dextrose (GLUTOSE) oral gel 15 g, 15 g, Oral, Q15 Min PRN, Michael Sandoval DO    dextrose (GLUTOSE) oral gel 15 g, 15 g, Oral, Q15 Min PRN, Agustín Villarreal MD    Enoxaparin Sodium (LOVENOX) syringe 40 mg, 40 mg, Subcutaneous, Q24H, Michael Sandoval DO, 40 mg at 10/02/23 0955    estradiol (ESTRACE) tablet 2 mg, 2 mg, Oral, Daily, Agustín Villarreal MD, 2 mg at 10/03/23 0932    ferrous sulfate EC tablet 324 mg, 324 mg, Oral, BID  With Meals, Agustín Villarreal MD, 324 mg at 10/03/23 1746    guaiFENesin (MUCINEX) 12 hr tablet 600 mg, 600 mg, Oral, Q12H, Michael Sandoval DO, 600 mg at 10/03/23 2029    Insulin Aspart (novoLOG) injection 3-14 Units, 3-14 Units, Subcutaneous, 4x Daily AC & at Bedtime, Michael Sandoval DO, 10 Units at 10/03/23 2033    insulin detemir (LEVEMIR) injection 25 Units, 25 Units, Subcutaneous, Nightly, Agustín Villarreal MD, 25 Units at 10/03/23 2033    [START ON 10/5/2023] insulin detemir (LEVEMIR) injection 60 Units, 60 Units, Subcutaneous, Daily, Agustín Villarreal MD    insulin patient supplied pump, , Subcutaneous, Continuous, Agustín Villarreal MD    ipratropium-albuterol (DUO-NEB) nebulizer solution 3 mL, 3 mL, Nebulization, 4x Daily - RT, Luz Rosales APRN, 3 mL at 10/04/23 0754    ipratropium-albuterol (DUO-NEB) nebulizer solution 3 mL, 3 mL, Nebulization, Q4H PRN, Luz Rosales APRN    lisinopril (PRINIVIL,ZESTRIL) tablet 20 mg, 20 mg, Oral, Q24H, Michael Sandoval DO, 20 mg at 10/03/23 0932    meloxicam (MOBIC) tablet 15 mg, 15 mg, Oral, Daily, Michael Sandoval DO, 15 mg at 10/03/23 0932    predniSONE (DELTASONE) tablet 40 mg, 40 mg, Oral, BID With Meals, Agustín Villarreal MD    [COMPLETED] Insert Peripheral IV, , , Once **AND** sodium chloride 0.9 % flush 10 mL, 10 mL, Intravenous, PRN, Juwan Munoz MD, 10 mL at 10/01/23 2009      Assessment & Plan     Acute Exacerbation of COPD due to Acute Rhinovirus Infection w/ Hypoxia: Clinically better today.  Lung exam is much improved.  I'm going to change her to PO Prednisone.  Continue nebs and wean O2 as able.  Will need a Walking Ox near discharge.  I wouldn't be surprised if she needs a little O2 w/ exertion.  Encouraged continued ambulation  Diabetes Mellitus, Type 2 in Obese: A1c was 7.5%.  I've adjusted her insulin regimen given the change to PO steroids.  Expect trend to improve.  Anxiety: No acute issues.  Continue BuSpar.  Essential Hypertension: Not at goal  on exam, and trend is up.  Will monitor closely over the next 24 hours.  May need adjustment in therapy.  Dyslipidemia: Continue Lipitor.  Pulmonary Nodule: Outpatient follow-up.    Plan for disposition: Home when able.    Agustín Villarreal MD  10/04/23  09:20 EDT

## 2023-10-05 VITALS
TEMPERATURE: 97.9 F | SYSTOLIC BLOOD PRESSURE: 155 MMHG | HEIGHT: 70 IN | RESPIRATION RATE: 20 BRPM | OXYGEN SATURATION: 92 % | DIASTOLIC BLOOD PRESSURE: 85 MMHG | HEART RATE: 78 BPM | BODY MASS INDEX: 30.05 KG/M2 | WEIGHT: 209.88 LBS

## 2023-10-05 LAB
ANION GAP SERPL CALCULATED.3IONS-SCNC: 12.4 MMOL/L (ref 5–15)
BUN SERPL-MCNC: 24 MG/DL (ref 8–23)
BUN/CREAT SERPL: 40 (ref 7–25)
CALCIUM SPEC-SCNC: 9.2 MG/DL (ref 8.6–10.5)
CHLORIDE SERPL-SCNC: 99 MMOL/L (ref 98–107)
CO2 SERPL-SCNC: 25.6 MMOL/L (ref 22–29)
CREAT SERPL-MCNC: 0.6 MG/DL (ref 0.57–1)
EGFRCR SERPLBLD CKD-EPI 2021: 102.3 ML/MIN/1.73
GLUCOSE SERPL-MCNC: 317 MG/DL (ref 65–99)
POTASSIUM SERPL-SCNC: 4.2 MMOL/L (ref 3.5–5.2)
SODIUM SERPL-SCNC: 137 MMOL/L (ref 136–145)

## 2023-10-05 PROCEDURE — 63710000001 INSULIN DETEMIR PER 5 UNITS: Performed by: HOSPITALIST

## 2023-10-05 PROCEDURE — 80048 BASIC METABOLIC PNL TOTAL CA: CPT | Performed by: HOSPITALIST

## 2023-10-05 PROCEDURE — 94761 N-INVAS EAR/PLS OXIMETRY MLT: CPT

## 2023-10-05 PROCEDURE — 25010000002 INFLUENZA VAC SPLIT QUAD 0.5 ML SUSPENSION PREFILLED SYRINGE: Performed by: INTERNAL MEDICINE

## 2023-10-05 PROCEDURE — 94664 DEMO&/EVAL PT USE INHALER: CPT

## 2023-10-05 PROCEDURE — 63710000001 PREDNISONE PER 1 MG: Performed by: HOSPITALIST

## 2023-10-05 PROCEDURE — 94618 PULMONARY STRESS TESTING: CPT

## 2023-10-05 PROCEDURE — 90686 IIV4 VACC NO PRSV 0.5 ML IM: CPT | Performed by: INTERNAL MEDICINE

## 2023-10-05 PROCEDURE — 25010000002 ENOXAPARIN PER 10 MG: Performed by: FAMILY MEDICINE

## 2023-10-05 PROCEDURE — 94799 UNLISTED PULMONARY SVC/PX: CPT

## 2023-10-05 PROCEDURE — 63710000001 INSULIN ASPART PER 5 UNITS: Performed by: FAMILY MEDICINE

## 2023-10-05 PROCEDURE — G0008 ADMIN INFLUENZA VIRUS VAC: HCPCS | Performed by: INTERNAL MEDICINE

## 2023-10-05 RX ORDER — PREDNISONE 10 MG/1
TABLET ORAL
Qty: 21 EACH | Refills: 0 | Status: SHIPPED | OUTPATIENT
Start: 2023-10-05

## 2023-10-05 RX ORDER — NICOTINE 21 MG/24HR
1 PATCH, TRANSDERMAL 24 HOURS TRANSDERMAL EVERY 24 HOURS
Qty: 30 PATCH | Refills: 0 | Status: SHIPPED | OUTPATIENT
Start: 2023-10-05

## 2023-10-05 RX ORDER — BENZONATATE 200 MG/1
200 CAPSULE ORAL 3 TIMES DAILY PRN
Qty: 30 CAPSULE | Refills: 0 | Status: SHIPPED | OUTPATIENT
Start: 2023-10-05

## 2023-10-05 RX ORDER — GUAIFENESIN 600 MG/1
600 TABLET, EXTENDED RELEASE ORAL EVERY 12 HOURS SCHEDULED
Start: 2023-10-05

## 2023-10-05 RX ORDER — IPRATROPIUM BROMIDE AND ALBUTEROL SULFATE 2.5; .5 MG/3ML; MG/3ML
3 SOLUTION RESPIRATORY (INHALATION) EVERY 4 HOURS PRN
Qty: 90 ML | Refills: 1 | Status: SHIPPED | OUTPATIENT
Start: 2023-10-05

## 2023-10-05 RX ORDER — ALBUTEROL SULFATE 90 UG/1
2 AEROSOL, METERED RESPIRATORY (INHALATION) EVERY 4 HOURS PRN
Qty: 8.5 G | Refills: 1 | Status: SHIPPED | OUTPATIENT
Start: 2023-10-05

## 2023-10-05 RX ADMIN — LISINOPRIL 20 MG: 20 TABLET ORAL at 08:39

## 2023-10-05 RX ADMIN — INSULIN DETEMIR 60 UNITS: 100 INJECTION, SOLUTION SUBCUTANEOUS at 08:41

## 2023-10-05 RX ADMIN — CHOLECALCIFEROL TAB 25 MCG (1000 UNIT) 1000 UNITS: 25 TAB at 08:40

## 2023-10-05 RX ADMIN — ESTRADIOL 2 MG: 1 TABLET ORAL at 08:39

## 2023-10-05 RX ADMIN — CETIRIZINE HYDROCHLORIDE 10 MG: 10 TABLET, FILM COATED ORAL at 08:40

## 2023-10-05 RX ADMIN — OXYCODONE HYDROCHLORIDE AND ACETAMINOPHEN 500 MG: 500 TABLET ORAL at 08:39

## 2023-10-05 RX ADMIN — FERROUS SULFATE TAB EC 324 MG (65 MG FE EQUIVALENT) 324 MG: 324 (65 FE) TABLET DELAYED RESPONSE at 08:39

## 2023-10-05 RX ADMIN — IPRATROPIUM BROMIDE AND ALBUTEROL SULFATE 3 ML: .5; 3 SOLUTION RESPIRATORY (INHALATION) at 07:48

## 2023-10-05 RX ADMIN — ENOXAPARIN SODIUM 40 MG: 40 INJECTION SUBCUTANEOUS at 08:39

## 2023-10-05 RX ADMIN — Medication 1 TABLET: at 08:39

## 2023-10-05 RX ADMIN — MELOXICAM 15 MG: 7.5 TABLET ORAL at 08:39

## 2023-10-05 RX ADMIN — GUAIFENESIN 600 MG: 600 TABLET, EXTENDED RELEASE ORAL at 08:39

## 2023-10-05 RX ADMIN — INFLUENZA VIRUS VACCINE 0.5 ML: 15; 15; 15; 15 SUSPENSION INTRAMUSCULAR at 11:15

## 2023-10-05 RX ADMIN — ASPIRIN 81 MG: 81 TABLET, COATED ORAL at 08:40

## 2023-10-05 RX ADMIN — PREDNISONE 40 MG: 20 TABLET ORAL at 08:39

## 2023-10-05 RX ADMIN — INSULIN ASPART 5 UNITS: 100 INJECTION, SOLUTION INTRAVENOUS; SUBCUTANEOUS at 08:40

## 2023-10-05 RX ADMIN — ATORVASTATIN CALCIUM 40 MG: 40 TABLET, FILM COATED ORAL at 08:39

## 2023-10-05 NOTE — PLAN OF CARE
Goal Outcome Evaluation:           Progress: improving  Outcome Evaluation: pt on 2 L of oxygen, oxygen drops with excertion, pt ambulates around room, no complaints, rested throughout the night

## 2023-10-05 NOTE — DISCHARGE INSTR - OTHER ORDERS
[employer] ([employer]  [phone]    Provided portable 02 at discharge  Contact [facility] when you arrive home to arrange for   Oxygen concentrator to be delivered to your home today.

## 2023-10-05 NOTE — CASE MANAGEMENT/SOCIAL WORK
Continued Stay Note  [facility]     Patient Name: Magda Nath  MRN: [id number]  Today's Date: 10/5/2023    Admit Date: 10/1/2023    Plan: plan home w    Discharge Plan       Row Name 10/05/23 1115       Plan    Plan plan home w     Patient/Family in Agreement with Plan yes    Plan Comments Patient preparing for dc. Portable 02 tank delivered to bedside per [facility]. Instruct patient to notify [employer] once she arrives home to set up delivery of oxygen concentrator to the home today. Contact information for [employer] placed on AVS. Patient verbalizes understanding. Bharati RUEDA updated. Spoke with Tia/Toni - referral given. CM will continue to follow.                   Discharge Codes    No documentation.                 Expected Discharge Date and Time       Expected Discharge Date Expected Discharge Time    Oct 5, 2023               Bhavin Weber, RN

## 2023-10-05 NOTE — PROCEDURES
Exercise Oximetry    Patient Name:Madga Nath   MRN: [id number]   Date: 10/05/23             ROOM AIR BASELINE   SpO2% 90   Heart Rate 74   Blood Pressure      EXERCISE ON ROOM AIR SpO2% EXERCISE ON O2 @  2LPM SpO2%   1 MINUTE 86 1 MINUTE    2 MINUTES  2 MINUTES 2lpm 92   3 MINUTES  3 MINUTES 92   4 MINUTES  4 MINUTES 93   5 MINUTES  5 MINUTES 92   6 MINUTES  6 MINUTES 93              Distance Walked   Distance Walked  400ft   Dyspnea (Alberto Scale)   Dyspnea (Alberto Scale)    Fatigue (Alberto Scale)   Fatigue (Alberto Scale)    SpO2% Post Exercise   SpO2% Post Exercise 91 on room air after 1 minute of rest   HR Post Exercise   HR Post Exercise 81   Time to Recovery   Time to Recovery 1min     Comments: Fore head probe and PLB used with walk

## 2023-10-05 NOTE — CASE MANAGEMENT/SOCIAL WORK
Continued Stay Note  [facility]     Patient Name: Magda Nath  MRN: [id number]  Today's Date: 10/5/2023    Admit Date: 10/1/2023    Plan: plan home w    Discharge Plan       Row Name 10/05/23 1502       Plan    Final Discharge Disposition Code 01 - home or self-care    Final Note dc home                   Discharge Codes    No documentation.                 Expected Discharge Date and Time       Expected Discharge Date Expected Discharge Time    Oct 5, 2023               Bhavin Weber RN

## 2023-10-05 NOTE — DISCHARGE SUMMARY
"Magda Nath  1960  [id number]    Hospitalists Discharge Summary    Date of Admission: 10/1/2023  Date of Discharge:  10/5/2023    History of Present Illness from [facility] on admit:   \"63 y/o female with hx of DM II, hyperlipidemia, and recently diagnosed HTN, as well as tobacco abuse and likely COPD, presents to [facility] ED for persistent SOB.  Patient was diagnosed with COVID-19 about 10 days ago.  She was seen in immediate care and prescribed oral steroids, breathing treatments, Paxlovid, Symbicort, and doxycycline.  She started to feel a little better, and returned to work 5 days later.  Over the last few days, she has had more wheezing and a more productive cough with \"white foamy sputum.\"  She feels her lungs and chest getting tight, especially with exertion.  She has a pulse ox at home and has been noticing her sats drop to the 70s at night and mid to low 80s during the day.  It improves to high 80s after a breathing treatment, then drops back to low 80s.  She had fevers for the first few days of her illness, but none since.  She continues to smoke a few cigarettes before and after work.\"      Primary Discharge diagnoses:  AHRF and AECOPD secondary to rhinovirus infection    Secondary Discharge Diagnoses:   DM2 in obese with steroid-induced hyperglycemia: A1c 7.5%  Hypertension  5 mm noncalcified pulmonary nodule left lower lobe  Dyslipidemia  Anxiety    Hospital Course Summary:   Oxygen was gradually weaned, nebulizer treatments were utilized, IV then oral steroids given.  D/C home today with f/u Francesca Adrian PA 1 week  F/U pulmonary (ambulatory referral made)     PCP  Patient Care Team:  Francesca Adrian PA as PCP - General (Physician Assistant)    Consults:   Consults       No orders found from 9/2/2023 to 10/2/2023.          Operations and Procedures Performed:     XR Chest 1 View    Result Date: 10/1/2023  Narrative: XR CHEST 1 VW-, 10/1/2023  HISTORY: Shortness of breath. COVID " diagnosis 1 week ago.  COMPARISON: *  None available  FINDINGS: Single frontal view(s) of the chest. The lungs are clear. No pleural effusions. No pneumothorax. Heart size and mediastinal contours are within normal limits. Pulmonary vasculature is unremarkable. No acute osseous abnormalities.       Impression: No acute cardiopulmonary findings.  This report was finalized on 10/1/2023 8:05 AM by Dr. Kevin Mendez MD.      CT Angiogram Chest    Result Date: 10/1/2023  Narrative: CT ANGIOGRAM CHEST-10/1/2023  INDICATION: Worsening shortness of air and cough. COVID diagnosis 8 days ago.  TECHNIQUE: CT angiogram of the chest with IV contrast. 3-D reconstructions were obtained and reviewed.   Radiation dose reduction techniques included automated exposure control or exposure modulation based on body size. Count of known CT and cardiac nuc med studies performed in previous 12 months: 0.  COMPARISON: None available.  FINDINGS: Adequate opacification of the pulmonary arteries with no filling defects. Thoracic aorta normal in course and caliber without dissection. Heart size is normal. No pericardial effusion.  No pleural effusion. No pneumothorax. No focal pulmonary infiltrates. 5 mm noncalcified pulmonary nodule in the left lower lobe (image 87).  Visualized upper abdomen is unremarkable.  No acute osseous abnormality.      Impression: 1. Negative for pulmonary embolus. 2. Negative for thoracic aortic aneurysm/dissection. 3. No acute pulmonary process. 4. 5 mm noncalcified pulmonary nodule in the left lower lobe. Management recommendation: Based on current published guidelines, uncomplicated pulmonary nodules less than 6 mm do not require CT follow-up. In high risk patients, follow-up CT in 12 months is optional. (Fleischner Society guidelines, 2017).  This report was finalized on 10/1/2023 9:24 AM by Dr. Kevin Mendez MD.       Allergies:  is allergic to macrobid [nitrofurantoin] and erythromycin.    David  Reviewed      Discharge Medications:     Discharge Medications        New Medications        Instructions Start Date   benzonatate 200 MG capsule  Commonly known as: TESSALON   200 mg, Oral, 3 Times Daily PRN      guaiFENesin 600 MG 12 hr tablet  Commonly known as: MUCINEX   600 mg, Oral, Every 12 Hours Scheduled      nicotine 21 MG/24HR patch  Commonly known as: NICODERM CQ   1 patch, Transdermal, Every 24 Hours      predniSONE 10 MG (21) dose pack  Commonly known as: DELTASONE   Take by mouth as directed on package             Changes to Medications        Instructions Start Date   ALBUTEROL SULFATE IN  What changed: Another medication with the same name was added. Make sure you understand how and when to take each.   2 puffs, Inhalation, Every 6 Hours PRN      albuterol sulfate  (90 Base) MCG/ACT inhaler  Commonly known as: Proventil HFA  What changed: You were already taking a medication with the same name, and this prescription was added. Make sure you understand how and when to take each.   2 puffs, Inhalation, Every 4 Hours PRN      cholecalciferol 25 MCG (1000 UT) tablet  Commonly known as: VITAMIN D3  What changed: Another medication with the same name was removed. Continue taking this medication, and follow the directions you see here.   1,000 Units, Oral, Daily      ipratropium-albuterol 0.5-2.5 mg/3 ml nebulizer  Commonly known as: DUO-NEB  What changed: Another medication with the same name was added. Make sure you understand how and when to take each.   3 mL, Nebulization, Every 4 Hours PRN      ipratropium-albuterol 0.5-2.5 mg/3 ml nebulizer  Commonly known as: DUO-NEB  What changed: You were already taking a medication with the same name, and this prescription was added. Make sure you understand how and when to take each.   3 mL, Nebulization, Every 4 Hours PRN             Continue These Medications        Instructions Start Date   Ambien 10 MG tablet  Generic drug: zolpidem   10 mg, Oral, Nightly PRN       aspirin 81 MG EC tablet   81 mg, Oral, Daily      atorvastatin 40 MG tablet  Commonly known as: LIPITOR   40 mg, Oral, Daily      BASAGLAR KWIKPEN SC   60 Units, Subcutaneous, Daily      busPIRone 5 MG tablet  Commonly known as: BUSPAR   5 mg, Oral, 3 Times Daily PRN      cyclobenzaprine 10 MG tablet  Commonly known as: FLEXERIL   10 mg, Oral, Nightly PRN      estradiol 2 MG tablet  Commonly known as: ESTRACE   2 mg, Oral, Daily      ferrous sulfate 325 (65 FE) MG tablet   325 mg, Oral, 2 Times Daily With Meals      furosemide 20 MG tablet  Commonly known as: LASIX   20 mg, Oral, Daily PRN      levocetirizine 5 MG tablet  Commonly known as: XYZAL   5 mg, Oral, Every Evening      lisinopril 10 MG tablet  Commonly known as: PRINIVIL,ZESTRIL   10 mg, Oral, Daily      meloxicam 15 MG tablet  Commonly known as: MOBIC   15 mg, Oral, Daily      Mirabegron ER 25 MG tablet sustained-release 24 hour 24 hr tablet  Commonly known as: MYRBETRIQ   25 mg, Oral, Daily      Trulance 3 MG tablet  Generic drug: Plecanatide   3 mg, Oral, Daily PRN      vitamin b complex capsule capsule   Oral, Daily      Vitamin C 500 MG capsule   500 mg, Oral, Daily               Last Lab Results:   Lab Results (most recent)       Procedure Component Value Units Date/Time    Basic Metabolic Panel [[id number]]  (Abnormal) Collected: 10/04/23 0836    Specimen: Blood Updated: 10/04/23 0854     Glucose 276 mg/dL      BUN 19 mg/dL      Creatinine 0.52 mg/dL      Sodium 132 mmol/L      Potassium 4.9 mmol/L      Chloride 94 mmol/L      CO2 28.6 mmol/L      Calcium 9.0 mg/dL      BUN/Creatinine Ratio 36.5     Anion Gap 9.4 mmol/L      eGFR 105.9 mL/min/1.73     Narrative:      GFR Normal >60  Chronic Kidney Disease <60  Kidney Failure <15      Respiratory Panel PCR w/COVID-19(SARS-CoV-2) HARPER/WILIAM/DINORA/PAD/COR/MAD/JASON In-House, NP Swab in [facility], 3-4 HR TAT - Swab, Nasopharynx [[id number]]  (Abnormal) Collected: 10/02/23 1037    Specimen: Swab from Nasopharynx  Updated: 10/02/23 1825     ADENOVIRUS, PCR Not Detected     Coronavirus 229E Not Detected     Coronavirus HKU1 Not Detected     Coronavirus NL63 Not Detected     Coronavirus OC43 Not Detected     COVID19 Not Detected     Human Metapneumovirus Not Detected     Human Rhinovirus/Enterovirus Detected     Influenza A PCR Not Detected     Influenza B PCR Not Detected     Parainfluenza Virus 1 Not Detected     Parainfluenza Virus 2 Not Detected     Parainfluenza Virus 3 Not Detected     Parainfluenza Virus 4 Not Detected     RSV, PCR Not Detected     Bordetella pertussis pcr Not Detected     Bordetella parapertussis PCR Not Detected     Chlamydophila pneumoniae PCR Not Detected     Mycoplasma pneumo by PCR Not Detected    Narrative:      In the setting of a positive respiratory panel with a viral infection PLUS a negative procalcitonin without other underlying concern for bacterial infection, consider observing off antibiotics or discontinuation of antibiotics and continue supportive care. If the respiratory panel is positive for atypical bacterial infection (Bordetella pertussis, Chlamydophila pneumoniae, or Mycoplasma pneumoniae), consider antibiotic de-escalation to target atypical bacterial infection.    POC Glucose Once [[id number]]  (Abnormal) Collected: 10/02/23 1219    Specimen: Blood Updated: 10/02/23 1224     Glucose 225 mg/dL     POC Glucose Once [[id number]]  (Abnormal) Collected: 10/02/23 0757    Specimen: Blood Updated: 10/02/23 0803     Glucose 243 mg/dL     Hemoglobin A1c [[id number]]  (Abnormal) Collected: 10/02/23 0353    Specimen: Blood Updated: 10/02/23 0450     Hemoglobin A1C 7.50 %     Narrative:      Hemoglobin A1C Ranges:    Increased Risk for Diabetes  5.7% to 6.4%  Diabetes                     >= 6.5%  Diabetic Goal                < 7.0%    Basic Metabolic Panel [[id number]]  (Abnormal) Collected: 10/02/23 0353    Specimen: Blood Updated: 10/02/23 0438     Glucose 266 mg/dL      BUN 17 mg/dL       Creatinine 0.49 mg/dL      Sodium 140 mmol/L      Potassium 4.7 mmol/L      Chloride 104 mmol/L      CO2 26.9 mmol/L      Calcium 8.7 mg/dL      BUN/Creatinine Ratio 34.7     Anion Gap 9.1 mmol/L      eGFR 107.4 mL/min/1.73     Narrative:      GFR Normal >60  Chronic Kidney Disease <60  Kidney Failure <15      CBC Auto Differential [[id number]]  (Abnormal) Collected: 10/02/23 0353    Specimen: Blood Updated: 10/02/23 0416     WBC 11.79 10*3/mm3      RBC 5.21 10*6/mm3      Hemoglobin 14.9 g/dL      Hematocrit 46.4 %      MCV 89.1 fL      MCH 28.6 pg      MCHC 32.1 g/dL      RDW 14.4 %      RDW-SD 46.2 fl      MPV 10.1 fL      Platelets 268 10*3/mm3      Neutrophil % 80.8 %      Lymphocyte % 11.1 %      Monocyte % 6.9 %      Eosinophil % 0.0 %      Basophil % 0.3 %      Immature Grans % 0.9 %      Neutrophils, Absolute 9.53 10*3/mm3      Lymphocytes, Absolute 1.31 10*3/mm3      Monocytes, Absolute 0.81 10*3/mm3      Eosinophils, Absolute 0.00 10*3/mm3      Basophils, Absolute 0.03 10*3/mm3      Immature Grans, Absolute 0.11 10*3/mm3      nRBC 0.0 /100 WBC     Comprehensive Metabolic Panel [[id number]]  (Abnormal) Collected: 10/01/23 0720    Specimen: Blood from Arm, Right Updated: 10/01/23 0744     Glucose 203 mg/dL      BUN 15 mg/dL      Creatinine 0.55 mg/dL      Sodium 139 mmol/L      Potassium 4.2 mmol/L      Chloride 101 mmol/L      CO2 26.5 mmol/L      Calcium 8.6 mg/dL      Total Protein 6.2 g/dL      Albumin 3.7 g/dL      ALT (SGPT) 12 U/L      AST (SGOT) 12 U/L      Alkaline Phosphatase 93 U/L      Total Bilirubin 0.5 mg/dL      Globulin 2.5 gm/dL      A/G Ratio 1.5 g/dL      BUN/Creatinine Ratio 27.3     Anion Gap 11.5 mmol/L      eGFR 104.4 mL/min/1.73     Narrative:      GFR Normal >60  Chronic Kidney Disease <60  Kidney Failure <15      D-dimer, Quantitative [[id number]]  (Abnormal) Collected: 10/01/23 0720    Specimen: Blood from Arm, Right Updated: 10/01/23 0740     D-Dimer, Quantitative 0.73 MCGFEU/mL      "Narrative:      According to the assay 's published package insert, a normal (<0.50 MCGFEU/mL) D-dimer result in conjunction with a non-high clinical probability assessment, excludes deep vein thrombosis (DVT) and pulmonary embolism (PE) with high sensitivity.    D-dimer values increase with age and this can make VTE exclusion of an older population difficult. To address this, the American College of [employer], based on best available evidence and recent guidelines, recommends that clinicians use age-adjusted D-dimer thresholds in patients greater than 50 years of age with: a) a low probability of PE who do not meet all Pulmonary Embolism Rule Out Criteria, or b) in those with intermediate probability of PE.   The formula for an age-adjusted D-dimer cut-off is \"age/100\".  For example, a 60 year old patient would have an age-adjusted cut-off of 0.60 MCGFEU/mL and an 80 year old 0.80 MCGFEU/mL.    CBC & Differential [[id number]]  (Abnormal) Collected: 10/01/23 0720    Specimen: Blood from Arm, Right Updated: 10/01/23 0727    Narrative:      The following orders were created for panel order CBC & Differential.  Procedure                               Abnormality         Status                     ---------                               -----------         ------                     CBC Auto Differential[[id number]]        Abnormal            Final result                 Please view results for these tests on the individual orders.    CBC Auto Differential [[id number]]  (Abnormal) Collected: 10/01/23 0720    Specimen: Blood from Arm, Right Updated: 10/01/23 0727     WBC 8.64 10*3/mm3      RBC 5.55 10*6/mm3      Hemoglobin 15.8 g/dL      Hematocrit 49.9 %      MCV 89.9 fL      MCH 28.5 pg      MCHC 31.7 g/dL      RDW 14.6 %      RDW-SD 48.2 fl      MPV 10.0 fL      Platelets 293 10*3/mm3      Neutrophil % 59.0 %      Lymphocyte % 25.0 %      Monocyte % 11.2 %      Eosinophil % 3.6 %      Basophil % 0.7 %      " Immature Grans % 0.5 %      Neutrophils, Absolute 5.10 10*3/mm3      Lymphocytes, Absolute 2.16 10*3/mm3      Monocytes, Absolute 0.97 10*3/mm3      Eosinophils, Absolute 0.31 10*3/mm3      Basophils, Absolute 0.06 10*3/mm3      Immature Grans, Absolute 0.04 10*3/mm3      nRBC 0.0 /100 WBC           Imaging Results (Most Recent)       Procedure Component Value Units Date/Time    CT Angiogram Chest [[id number]] Collected: 10/01/23 0919     Updated: 10/01/23 0926    Narrative:      CT ANGIOGRAM CHEST-10/1/2023     INDICATION:  Worsening shortness of air and cough. COVID diagnosis 8 days ago.     TECHNIQUE:  CT angiogram of the chest with IV contrast. 3-D reconstructions were  obtained and reviewed.   Radiation dose reduction techniques included  automated exposure control or exposure modulation based on body size.  Count of known CT and cardiac nuc med studies performed in previous 12  months: 0.     COMPARISON:  None available.     FINDINGS:  Adequate opacification of the pulmonary arteries with no filling  defects. Thoracic aorta normal in course and caliber without dissection.  Heart size is normal. No pericardial effusion.     No pleural effusion. No pneumothorax. No focal pulmonary infiltrates. 5  mm noncalcified pulmonary nodule in the left lower lobe (image 87).     Visualized upper abdomen is unremarkable.     No acute osseous abnormality.       Impression:      1. Negative for pulmonary embolus.  2. Negative for thoracic aortic aneurysm/dissection.  3. No acute pulmonary process.  4. 5 mm noncalcified pulmonary nodule in the left lower lobe. Management  recommendation: Based on current published guidelines, uncomplicated  pulmonary nodules less than 6 mm do not require CT follow-up. In high  risk patients, follow-up CT in 12 months is optional. (Fleischner  Society guidelines, 2017).     This report was finalized on 10/1/2023 9:24 AM by Dr. Kevin Mendez MD.       XR Chest 1 View [[id number]] Collected:  10/01/23 0805     Updated: 10/01/23 0808    Narrative:      XR CHEST 1 VW-, 10/1/2023     HISTORY:  Shortness of breath. COVID diagnosis 1 week ago.     COMPARISON:  *  None available     FINDINGS:  Single frontal view(s) of the chest. The lungs are clear. No pleural  effusions. No pneumothorax. Heart size and mediastinal contours are  within normal limits. Pulmonary vasculature is unremarkable. No acute  osseous abnormalities.          Impression:      No acute cardiopulmonary findings.     This report was finalized on 10/1/2023 8:05 AM by Dr. Kevin Mendez MD.             PROCEDURES: none    Condition on Discharge:  stable    Physical Exam at Discharge  Vital Signs  Temp:  [97.9 °F (36.6 °C)-98.6 °F (37 °C)] 97.9 °F (36.6 °C)  Heart Rate:  [69-88] 78  Resp:  [18-20] 20  BP: (119-155)/(66-85) 155/85  Body mass index is 30.11 kg/m².    Physical Exam  Physical Exam:  General: Patient is awake and alert. Well developed and well nourished. No acute distress noted.   HENT: Head is atraumatic, normocephalic. Hearing is grossly intact. Nose is without obvious congestion and appears patent. Neck is supple and trachea is midline.   Eyes: Vision is grossly intact. Pupils appear equal and round.   Cardiovascular: Heart has regular rate and rhythm with no murmurs, rubs or gallops noted.   Respiratory: Lungs are clear to ausculation without wheezes, rhonchi or rales.   Abdominal/GI: Soft, nontender, bowel sounds present. No rebound or guarding present.   Extremities: No peripheral edema noted.   Musculoskeletal: Spontaneous movement of bilateral upper and lower extremities against gravity noted. No signs of injury or deformity noted.   Skin: Warm and dry.   Psych: Mood and affect are appropriate. Cooperative with exam.   Neuro: No facial asymmetry noted. No focal deficits noted, hearing and vision are grossly intact.      Discharge Disposition  Home    Visiting Nurse:    No     Home PT/OT:  No     Home Safety Evaluation:  No  "    DME  Oxygen at 2L with exertion     Discharge Diet:      Dietary Orders (From admission, onward)       Start     Ordered    10/01/23 1253  Diet: Diabetic Diets; Consistent Carbohydrate; Texture: Regular Texture (IDDSI 7); Fluid Consistency: Thin (IDDSI 0)  Diet Effective Now        References:    Diet Order [place]   Question Answer Comment   Diets: Diabetic Diets    Diabetic Diet: Consistent Carbohydrate    Texture: Regular Texture (IDDSI 7)    Fluid Consistency: Thin (IDDSI 0)        10/01/23 1253                    Activity at Discharge:  As tolerated    Pre-discharge education  Diabetic, Smoking, Injectables, medications, follow up    Follow-up Appointments  No future appointments.  Additional Instructions for the Follow-ups that You Need to Schedule       Discharge Follow-up with PCP   As directed       Currently Documented PCP:    Francesca Adrian, PA    PCP Phone Number:    [phone]     Follow Up Details: 1 week              Test Results Pending at Discharge: none     Vazquez Thomas DO  10/05/23  09:40 EDT    Time: Discharge over 30 min (if over 30 minutes give explanation as to why it took greater than 30 minutes)  Secondary to:   Coordination of care/follow up  Medication reconciliation  D/W patient and family    \"Dictated utilizing Dragon dictation\"      "

## 2023-10-06 ENCOUNTER — READMISSION MANAGEMENT (OUTPATIENT)
Dept: CALL CENTER | Facility: HOSPITAL | Age: 63
End: 2023-10-06
Payer: COMMERCIAL

## 2023-10-06 NOTE — OUTREACH NOTE
Prep Survey      Flowsheet Row Responses   [facility] facility patient discharged from? [facility]   Is LACE score < 7 ? No   Eligibility Readm Mgmt   Discharge diagnosis COPD   Does the patient have one of the following disease processes/diagnoses(primary or secondary)? COPD   Does the patient have Home health ordered? No   Is there a DME ordered? Yes   What DME was ordered? O[employer]   Comments regarding appointments call for apmt   Prep survey completed? Yes            Odalis DONOVAN - Registered Nurse

## 2023-10-06 NOTE — PROGRESS NOTES
"Enter Query Response Below      Query Response: Hyperglycemia expected , known side effect of IV steroids and not adverse reaction.              If applicable, please update the problem list.       Patient: Magda Nath        : 1960  Account: [id number]           Admit Date: 10/1/2023        How to Respond to this query:       a. Click New Note     b. Answer query within the yellow box.                c. Update the Problem List, if applicable.      If you have any questions about this query contact me at: [email or web address]     Dr. Thomas    62 year old female with type 2 diabetes was admitted 10/1 with COPD exacerbation, treated with IV steroids 10/1-10/4. Blood glucose 203 on admission increased to 340 and was 317 on day of discharge. The discharge summary includes \"steroid-induced hyperglycemia.\"     Treatment: scheduled long acting and sliding scale insulin    Please clarify the following:    -Hyperglycemia expected  -Hyperglycemia not expected  -Other- specify_______  -Unable to determine    By submitting this query, we are merely seeking further clarification of documentation to accurately reflect all conditions that you are monitoring, evaluating, treating or that extend the hospitalization or utilize additional resources of care. Please utilize your independent clinical judgment when addressing the question(s) above.     This query and your response, once completed, will be entered into the legal medical record.    Sincerely,  Nisha Albert RN CCDS  Clinical Documentation Integrity Program     "

## 2023-10-11 ENCOUNTER — READMISSION MANAGEMENT (OUTPATIENT)
Dept: CALL CENTER | Facility: HOSPITAL | Age: 63
End: 2023-10-11
Payer: COMMERCIAL

## 2023-10-11 NOTE — OUTREACH NOTE
COPD/PN Week 1 Survey      Flowsheet Row Responses   [facility] facility patient discharged from? [facility]   Does the patient have one of the following disease processes/diagnoses(primary or secondary)? COPD   Week 1 attempt successful? No   Unsuccessful attempts Attempt 1            Caitlin Ibrahim Registered Nurse

## 2023-10-16 ENCOUNTER — READMISSION MANAGEMENT (OUTPATIENT)
Dept: CALL CENTER | Facility: HOSPITAL | Age: 63
End: 2023-10-16
Payer: COMMERCIAL

## 2023-10-16 NOTE — OUTREACH NOTE
COPD/PN Week 1 Survey      Flowsheet Row Responses   [facility] patient discharged from? [place]   Does the patient have one of the following disease processes/diagnoses(primary or secondary)? COPD   Week 1 attempt successful? Yes   Call start time 1129   Call end time 1132   Discharge diagnosis COPD   Meds reviewed with patient/caregiver? Yes   Is the patient having any side effects they believe may be caused by any medication additions or changes? No   Does the patient have all medications ordered at discharge? Yes   Is the patient taking all medications as directed (includes completed medication regime)? Yes   Comments regarding appointments Pulmonology appt 10/25/23   Does the patient have a primary care provider?  Yes   Does the patient have an appointment with their PCP or specialist within 7 days of discharge? Yes   Comments regarding PCP Has had PCP follow up   Has the patient kept scheduled appointments due by today? Yes   Has home health visited the patient within 72 hours of discharge? N/A   Pulse Ox monitoring Intermittent   Pulse Ox device source Patient   O2 Sat comments 94% on 2L   O2 Sat: education provided Sat levels, Monitoring frequency, When to seek care   Psychosocial issues? No   Did the patient receive a copy of their discharge instructions? Yes   Nursing interventions Reviewed instructions with patient   What is the patient's perception of their health status since discharge? Improving   Nursing Interventions Nurse provided patient education   Are the patient's immunizations up to date?  Yes   Nursing interventions Educated on importance of maintaining up to date immunizations as advised by provider  [Patient will discuss RSV vaccine at pulonology appt]   If the patient is a current smoker, are they able to teach back resources for cessation? Not a smoker  [Not currently smoking]   Is the patient/caregiver able to teach back the hierarchy of who to call/visit for symptoms/problems?  PCP, Specialist, Home health nurse, Urgent Care, ED, 911 Yes   Is the patient able to teach back COPD zones? Yes   Nursing interventions Education provided on various zones   Patient reports what zone on this call? Green Zone   Green Zone Reports doing well, Usual activity and exercise level, Slept well last night, Appetite is good, Usual amounts of cough and phlegm/mucous, Breathing without shortness of breath   Green Zone interventions: Take daily medications, Use oxygen as prescribed, Continue regular exercise/diet plan, At all times avoid cigarette smoking, vaping and inhaled irritants   Week 1 call completed? Yes   Is the patient interested in additional calls from an ambulatory ? No   Wrap up additional comments Patient improving, denies any concerns today.   Call end time 1132            Elma MALIK - Registered Nurse

## 2023-10-24 ENCOUNTER — READMISSION MANAGEMENT (OUTPATIENT)
Dept: CALL CENTER | Facility: HOSPITAL | Age: 63
End: 2023-10-24
Payer: COMMERCIAL

## 2023-10-24 NOTE — OUTREACH NOTE
COPD/PN Week 2 Survey      Flowsheet Row Responses   [facility] patient discharged from? [place]   Does the patient have one of the following disease processes/diagnoses(primary or secondary)? COPD   Week 2 attempt successful? Yes   Call start time 1829   Call end time 1832   Discharge diagnosis COPD   Is the patient taking all medications as directed (includes completed medication regime)? Yes   Medication comments completed steroids   Does the patient have a primary care provider?  Yes   Comments Saw PCP and pulmonary in the am   Pulse Ox monitoring Intermittent   Pulse Ox device source Patient   O2 Sat comments 2L with sats at 95%   Psychosocial issues? No   Patient reports what zone on this call? Green Zone   Green Zone Reports doing well, Breathing without shortness of breath, Slept well last night, Appetite is good   Green Zone interventions: Use oxygen as prescribed, Take daily medications   Week 2 call completed? Yes   Graduated Yes   Graduated/Revoked comments States she is doing well.  Has appt with pulmonary tomorrow. Denies questions or needs at this time.   Call end time 1832            Cherie MOTA - Licensed Nurse

## 2023-10-26 ENCOUNTER — TRANSCRIBE ORDERS (OUTPATIENT)
Dept: PULMONOLOGY | Facility: HOSPITAL | Age: 63
End: 2023-10-26
Payer: COMMERCIAL

## 2023-10-26 DIAGNOSIS — J44.9 CHRONIC OBSTRUCTIVE PULMONARY DISEASE, UNSPECIFIED COPD TYPE: Primary | ICD-10-CM

## 2023-10-27 ENCOUNTER — TRANSCRIBE ORDERS (OUTPATIENT)
Dept: ADMINISTRATIVE | Facility: HOSPITAL | Age: 63
End: 2023-10-27
Payer: COMMERCIAL

## 2023-10-27 DIAGNOSIS — Z87.891 PERSONAL HISTORY OF TOBACCO USE, PRESENTING HAZARDS TO HEALTH: Primary | ICD-10-CM

## 2023-11-08 ENCOUNTER — HOSPITAL ENCOUNTER (OUTPATIENT)
Dept: PULMONOLOGY | Facility: HOSPITAL | Age: 63
Discharge: HOME | End: 2023-11-08
Admitting: HOSPITALIST
Payer: COMMERCIAL

## 2023-11-08 DIAGNOSIS — J44.9 CHRONIC OBSTRUCTIVE PULMONARY DISEASE, UNSPECIFIED COPD TYPE: ICD-10-CM

## 2023-11-08 LAB
BDY SITE: ABNORMAL
CALCULATED HEMOGLOBIN, EPOC: ABNORMAL
HGB BLDA-MCNC: 15.1 G/DL (ref 13.5–17.5)
Lab: ABNORMAL
SAO2 % BLDCOA: 90.1 % (ref 94–99)

## 2023-11-08 PROCEDURE — 82820 HEMOGLOBIN-OXYGEN AFFINITY: CPT

## 2023-11-08 PROCEDURE — 94726 PLETHYSMOGRAPHY LUNG VOLUMES: CPT

## 2023-11-08 PROCEDURE — 94729 DIFFUSING CAPACITY: CPT

## 2023-11-08 PROCEDURE — 94060 EVALUATION OF WHEEZING: CPT

## 2023-11-08 RX ORDER — ALBUTEROL SULFATE 2.5 MG/3ML
2.5 SOLUTION RESPIRATORY (INHALATION) ONCE
Status: COMPLETED | OUTPATIENT
Start: 2023-11-08 | End: 2023-11-08

## 2023-11-08 RX ADMIN — ALBUTEROL SULFATE 2.5 MG: 2.5 SOLUTION RESPIRATORY (INHALATION) at 07:38

## 2023-11-28 ENCOUNTER — OFFICE VISIT (OUTPATIENT)
Dept: SURGERY | Facility: CLINIC | Age: 63
End: 2023-11-28
Payer: COMMERCIAL

## 2023-11-28 VITALS
OXYGEN SATURATION: 96 % | WEIGHT: 231.5 LBS | SYSTOLIC BLOOD PRESSURE: 142 MMHG | HEIGHT: 70 IN | DIASTOLIC BLOOD PRESSURE: 96 MMHG | BODY MASS INDEX: 33.14 KG/M2 | HEART RATE: 60 BPM

## 2023-11-28 DIAGNOSIS — K59.00 CONSTIPATION, UNSPECIFIED CONSTIPATION TYPE: ICD-10-CM

## 2023-11-28 DIAGNOSIS — K64.4 EXTERNAL HEMORRHOIDS: Primary | ICD-10-CM

## 2023-11-28 DIAGNOSIS — R15.1 FECAL SMEARING: ICD-10-CM

## 2023-11-28 DIAGNOSIS — K64.8 INTERNAL HEMORRHOIDS: ICD-10-CM

## 2023-11-28 RX ORDER — PROCHLORPERAZINE 25 MG/1
SUPPOSITORY RECTAL
COMMUNITY
Start: 2023-10-24

## 2023-11-28 RX ORDER — ALBUTEROL SULFATE 2.5 MG/3ML
2.5 SOLUTION RESPIRATORY (INHALATION)
COMMUNITY
Start: 2023-09-21

## 2023-11-28 RX ORDER — LUBIPROSTONE 8 UG/1
8 CAPSULE ORAL
Qty: 30 CAPSULE | Refills: 0 | Status: SHIPPED | OUTPATIENT
Start: 2023-11-28 | End: 2023-12-01

## 2023-11-28 RX ORDER — PROCHLORPERAZINE 25 MG/1
SUPPOSITORY RECTAL
COMMUNITY
Start: 2023-11-14

## 2023-11-28 RX ORDER — LEVOCETIRIZINE DIHYDROCHLORIDE 5 MG/1
5 TABLET, FILM COATED ORAL EVERY EVENING
COMMUNITY

## 2023-11-28 RX ORDER — ESTRADIOL 0.1 MG/G
1 CREAM VAGINAL DAILY
COMMUNITY
Start: 2023-09-28

## 2023-11-28 RX ORDER — BUDESONIDE AND FORMOTEROL FUMARATE DIHYDRATE 160; 4.5 UG/1; UG/1
2 AEROSOL RESPIRATORY (INHALATION)
COMMUNITY
Start: 2023-09-22

## 2023-11-28 NOTE — PROGRESS NOTES
"Magda Nath is a 62 y.o. female who is seen as a consult at the request of Elinor Barboza MD for Rectal Bleeding.      HPI:    Pt presents today for evaluation of hemorrhoids.   Had a hemorrhoidectomy in 2015.  Did well initially following the procedure, but now the hemorrhoids are worse than they were prior to that surgery.   C/O swelling/bleeding  Using suppositories PRN.     Has Hx of both urinary and fecal incontinence.   Wears a pad due to leakage.   S/P vaginal deliveries x2 with episiotomies.   Pt followed by Uro/Gyn at [facility].  Per Dr. Elinor Barboza's office note:  \"Surgical history is significant for Diverticulectomy in distant past with possible martius flap. Hysterectomy in 2005 for fibroids, Open SCP - posterior mesh only (was attempted laparoscopically but converted to laparotomy due to dense adhesive disease), in 11/2014 she underwent posterior repair, enterocele repair, iliococcygeal suspension, perineorrhaphy, cystoscopy with Dr. Davis, in 10/2016 underwent pubovaginal sling, \"urethrolysis\" (which would sound consistent with a repeat diverticulectomy, and Janey plication with Dr. Davis. This 2016 surgery has it noted that low transverse incision was made to the Fascia, then does not comment on fascial harvest and mentions cadaveric pubovaginal sling. 2016 surgery complicated by pfannenstiel skin incision wound dehiscence requiring wound vac placement. Pt also underwent hemorroidectomy in 2015.\"    Pt currently on fiber therapy for FI (1 teaspoon in coffee in the morning) and discussed SNS placement with Dr. Barboza.   Pt denies pelvic floor PT as she is uncomfortable with this therapy option.     Pt tends to be constipated.   Takes a stool softener QD.   Also takes Trulance 3 mg PRN.  If takes daily, causes uncontrollable diarrhea.   Denies trying Amitiza or Linzess in the past.   Typically has a BM daily.   [place]: 4 if consistent with her bowel regimen.   Stool harder if not.   Often strains to " have a BM.     + Heavy lifting while at work    Pt taking ASA 81 mg QD  Not taking any anticoagulation.     Pt states that her last colonoscopy was <10 years ago.   Denies personal Hx of polyps.   Father passed away from cancer, but unsure what type as he had metastatic disease when Dx and passed away shortly after.   No known FHx of IBD.     Pt admitted to [facility] 10/01 - 10/05/2023 for SOB.   Dx with Covid-19 prior to admission and prescribed oral steroids, breathing treatments, Paxlovid, Symbicort, and doxycycline.    She presented to [facility] ED for persistent symptoms.   O2 70-80%  Pt started on supplemental oxygen with plans to gradually wean off.    Past Medical History:   Diagnosis Date    COVID-19 09/19/2023    Diabetes mellitus     Fecal incontinence     RSV infection 10/01/2023    ADMITTED TO [facility]       Past Surgical History:   Procedure Laterality Date    COLONOSCOPY N/A     DR. THIERRY MENDEZ    HYSTERECTOMY N/A     PELVIC FLOOR REPAIR N/A     RECTOCELE REPAIR N/A     TOTAL HIP ARTHROPLASTY Left 04/2023    URETHRA SURGERY         Social History:   reports that she quit smoking about 8 weeks ago. Her smoking use included cigarettes. She started smoking about 49 years ago. She smoked an average of 1 pack per day. She has been exposed to tobacco smoke. She has never used smokeless tobacco. She reports that she does not drink alcohol and does not use drugs.      Marriage status:     Family History   Problem Relation Age of Onset    Diabetes Mother     Cancer Mother     Breast cancer Mother     Cancer Father     Breast cancer Sister          Current Outpatient Medications:     albuterol (PROVENTIL) (2.5 MG/3ML) 0.083% nebulizer solution, Take 2.5 mg by nebulization 4 (Four) Times a Day., Disp: , Rfl:     albuterol sulfate HFA (Proventil HFA) 108 (90 Base) MCG/ACT inhaler, Inhale 2 puffs Every 4 (Four) Hours As Needed for Wheezing., Disp: 8.5 g, Rfl: 1    Ascorbic Acid (Vitamin C) 500  MG capsule, Take 500 mg by mouth Daily., Disp: , Rfl:     aspirin 81 MG EC tablet, Take 1 tablet by mouth Daily., Disp: , Rfl:     atorvastatin (LIPITOR) 40 MG tablet, Take 1 tablet by mouth Daily., Disp: , Rfl:     B Complex Vitamins (vitamin b complex) capsule capsule, Take  by mouth Daily., Disp: , Rfl:     benzonatate (TESSALON) 200 MG capsule, Take 1 capsule by mouth 3 (Three) Times a Day As Needed for Cough., Disp: 30 capsule, Rfl: 0    busPIRone (BUSPAR) 5 MG tablet, Take 1 tablet by mouth 3 (Three) Times a Day As Needed (anxiety)., Disp: , Rfl:     Cholecalciferol 25 MCG (1000 UT) tablet, Take 1 tablet by mouth Daily., Disp: , Rfl:     Continuous Blood Gluc Sensor (Dexcom G6 Sensor), , Disp: , Rfl:     Continuous Blood Gluc Transmit (Dexcom G6 Transmitter) misc, , Disp: , Rfl:     cyclobenzaprine (FLEXERIL) 10 MG tablet, Take 1 tablet by mouth At Night As Needed for Muscle Spasms., Disp: , Rfl:     estradiol (ESTRACE) 0.1 MG/GM vaginal cream, Insert 1 applicator into the vagina Daily., Disp: , Rfl:     estradiol (ESTRACE) 2 MG tablet, Take 1 tablet by mouth Daily., Disp: , Rfl:     ferrous sulfate 325 (65 FE) MG tablet, Take 1 tablet by mouth 2 (Two) Times a Day With Meals., Disp: , Rfl:     furosemide (LASIX) 20 MG tablet, Take 1 tablet by mouth Daily As Needed (swelling)., Disp: , Rfl:     Insulin Glargine (BASAGLAR KWIKPEN SC), Inject 60 Units under the skin into the appropriate area as directed Daily., Disp: , Rfl:     ipratropium-albuterol (DUO-NEB) 0.5-2.5 mg/3 ml nebulizer, Take 3 mL by nebulization Every 4 (Four) Hours As Needed for Wheezing., Disp: 90 mL, Rfl: 1    levocetirizine (XYZAL) 5 MG tablet, Take 1 tablet by mouth Every Evening., Disp: , Rfl:     lisinopril (PRINIVIL,ZESTRIL) 10 MG tablet, Take 1 tablet by mouth Daily., Disp: , Rfl:     meloxicam (MOBIC) 15 MG tablet, Take 1 tablet by mouth Daily., Disp: , Rfl:     nicotine (NICODERM CQ) 21 MG/24HR patch, Place 1 patch on the skin as  directed by provider Daily., Disp: 30 patch, Rfl: 0    Symbicort 160-4.5 MCG/ACT inhaler, Inhale 2 puffs 2 (Two) Times a Day., Disp: , Rfl:     zolpidem (Ambien) 10 MG tablet, Take 1 tablet by mouth At Night As Needed for Sleep., Disp: , Rfl:     lubiprostone (Amitiza) 8 MCG capsule, Take 1 capsule by mouth Daily With Breakfast for 30 days., Disp: 30 capsule, Rfl: 0    Allergy  Erythromycin, Nitrofurantoin, and Codeine    Review of Systems   Constitutional: Positive for weight gain. Negative for decreased appetite.   HENT:  Negative for congestion, hearing loss and hoarse voice.    Eyes:  Negative for blurred vision, discharge and visual disturbance.   Cardiovascular:  Negative for chest pain, cyanosis and leg swelling.   Respiratory:  Negative for cough, shortness of breath, sleep disturbances due to breathing and snoring.    Endocrine: Negative for cold intolerance and heat intolerance.   Hematologic/Lymphatic: Does not bruise/bleed easily.   Skin:  Negative for itching, poor wound healing and skin cancer.   Musculoskeletal:  Negative for arthritis, back pain, joint pain and joint swelling.   Gastrointestinal:  Positive for constipation and hematochezia. Negative for abdominal pain, change in bowel habit and bowel incontinence.   Genitourinary:  Positive for frequency. Negative for bladder incontinence, dysuria and hematuria.   Neurological:  Negative for brief paralysis, excessive daytime sleepiness, dizziness, focal weakness, headaches, light-headedness and weakness.   Psychiatric/Behavioral:  Negative for altered mental status and hallucinations. The patient does not have insomnia.    Allergic/Immunologic: Negative for HIV exposure and persistent infections.   All other systems reviewed and are negative.      Vitals:    11/28/23 1101   BP: 142/96   Pulse: 60   SpO2: 96%     Body mass index is 33.22 kg/m².    Physical Exam  Exam conducted with a chaperone present.   Constitutional:       General: She is not in  acute distress.     Appearance: She is well-developed.   HENT:      Head: Normocephalic and atraumatic.      Nose: Nose normal.   Eyes:      Conjunctiva/sclera: Conjunctivae normal.      Pupils: Pupils are equal, round, and reactive to light.   Neck:      Trachea: No tracheal deviation.   Pulmonary:      Effort: Pulmonary effort is normal. No respiratory distress.      Breath sounds: Normal breath sounds.   Abdominal:      General: Bowel sounds are normal. There is no distension.      Palpations: Abdomen is soft.   Genitourinary:     Comments: Perianal exam: Enlarged external hemorrhoids and prolapsing internal hemorrhoids. Large tag associated with right anterior hemorrhoids.  Musculoskeletal:         General: No deformity. Normal range of motion.      Cervical back: Normal range of motion.   Skin:     General: Skin is warm and dry.   Neurological:      Mental Status: She is alert and oriented to person, place, and time.      Cranial Nerves: No cranial nerve deficit.      Coordination: Coordination normal.      Gait: Gait normal.   Psychiatric:         Behavior: Behavior normal.         Judgment: Judgment normal.       Review of Medical Record:  I reviewed medical records as detailed in HPI.     Assessment:  1. External hemorrhoids    2. Internal hemorrhoids    3. Fecal smearing    4. Constipation, unspecified constipation type    - New    Plan:    Hemorrhoids:   - Discussed physical exam findings with Pt as well as common causes of hemorrhoid irritation and enlargement.   - Encouraged good bowel habits and avoidance of straining as this will cause worsening hemorrhoid symptoms.   - Rx for Anusol-HC 2.5% Rectal Cream Provided. Apply Internally and Externally TID x7-10 Days. Cautioned against chronic use.   - Pt interested in repeat hemorrhoidectomy. Reviewed procedure, typical recovery time, as well as risks and benefits. Discussed risk of sphincter muscle injury which could cause worsening FI. Pt also on  supplemental oxygen and has follow up with Pulmonary. Would need clearance prior to undergoing surgery.      Constipation/FI:  - Increase fiber. Recommended 30-40 grams daily.   - Pt currently taking Trulance 3 mg PRN for constipation. Does not take daily as it causes diarrhea. Will D/C this an try Amitiza 8 mcg QD. Hopefully this will give her more bowel regularity, without diarrhea. Discussed with Pt that she will likely experience more fecal leakage if stool is loose. Will have to find a balance between constipation and FI.     Pt to follow up with Dr. Ho for consideration of hemorrhoidectomy.

## 2023-11-30 RX ORDER — HYDROCORTISONE 25 MG/G
CREAM TOPICAL
Qty: 30 G | Refills: 1 | Status: SHIPPED | OUTPATIENT
Start: 2023-11-30

## 2023-12-28 ENCOUNTER — TELEPHONE (OUTPATIENT)
Dept: SURGERY | Facility: CLINIC | Age: 63
End: 2023-12-28
Payer: COMMERCIAL

## 2023-12-28 RX ORDER — LINACLOTIDE 72 UG/1
72 CAPSULE, GELATIN COATED ORAL
Qty: 30 CAPSULE | Refills: 0 | OUTPATIENT
Start: 2023-12-28

## 2024-01-22 ENCOUNTER — HOSPITAL ENCOUNTER (OUTPATIENT)
Dept: CT IMAGING | Facility: HOSPITAL | Age: 64
Discharge: HOME | End: 2024-01-22
Payer: COMMERCIAL

## 2024-01-22 ENCOUNTER — OFFICE VISIT (OUTPATIENT)
Dept: SURGERY | Facility: CLINIC | Age: 64
End: 2024-01-22
Payer: COMMERCIAL

## 2024-01-22 VITALS
HEIGHT: 70 IN | HEART RATE: 68 BPM | OXYGEN SATURATION: 96 % | SYSTOLIC BLOOD PRESSURE: 148 MMHG | BODY MASS INDEX: 34.72 KG/M2 | WEIGHT: 242.5 LBS | DIASTOLIC BLOOD PRESSURE: 82 MMHG

## 2024-01-22 DIAGNOSIS — K64.8 INTERNAL HEMORRHOIDS: ICD-10-CM

## 2024-01-22 DIAGNOSIS — Z87.891 PERSONAL HISTORY OF TOBACCO USE, PRESENTING HAZARDS TO HEALTH: ICD-10-CM

## 2024-01-22 DIAGNOSIS — K64.4 EXTERNAL HEMORRHOIDS: Primary | ICD-10-CM

## 2024-01-22 RX ORDER — METRONIDAZOLE 500 MG/100ML
500 INJECTION, SOLUTION INTRAVENOUS ONCE
OUTPATIENT
Start: 2024-01-22 | End: 2024-01-22

## 2024-01-22 RX ORDER — LUBIPROSTONE 8 UG/1
8 CAPSULE ORAL
COMMUNITY
End: 2024-01-22

## 2024-01-22 RX ORDER — SODIUM CHLORIDE 9 MG/ML
40 INJECTION, SOLUTION INTRAVENOUS AS NEEDED
OUTPATIENT
Start: 2024-01-22

## 2024-01-22 RX ORDER — SODIUM CHLORIDE 0.9 % (FLUSH) 0.9 %
3-10 SYRINGE (ML) INJECTION AS NEEDED
OUTPATIENT
Start: 2024-01-22

## 2024-01-22 RX ORDER — SODIUM CHLORIDE 0.9 % (FLUSH) 0.9 %
3 SYRINGE (ML) INJECTION EVERY 12 HOURS SCHEDULED
OUTPATIENT
Start: 2024-01-22

## 2024-01-22 NOTE — PROGRESS NOTES
"Magda Nath is a 63 y.o. female in for follow up of External hemorrhoids    Internal hemorrhoids    The patient is a 63-year-old female who presents for a follow-up visit. She had a hemorrhoidectomy in the past but has hemorrhoids that are bothersome. She has chronic constipation, which she has partially controlled. She sees urogynecology and has urinary and fecal incontinence. She has had episiotomies and what sounds like surgery for a fistula.     She had a recent appointment with Viviane Nolan PA-C and Dr. Barboza is her urogynecologist, who referred her here. She will follow up for further treatment with Dr. Barboza when her rectal problems have resolved. She had to wait 3 to 4 months for this appointment. Dr. Barboza is not treating her rectum but has discussed nerve stimulator placement. Her colonoscopy and hemorrhoidectomy were done by Dr. Shoemaker around 2015. She denies having polyps at that time. The patient usually has daily bowel movements, but her general practitioner prescribed her Trulance for constipation but does not take it daily. She has also tried fiber supplements. She was prescribed Linzess by Viviane Nolan PA-C. These medications work as \"a clean out\", so she is unable take them daily. During her physical examination by Viviane Nolan PA-C, she was told that she has internal and external hemorrhoids as well as skin tags. The patient works 12-hour shifts at [employer], and her duties consist of frequent lifting. She has an external hemorrhoid that she would like removed.     She had COVID-19 and RSV, which caused acute respiratory failure on 10/01/2023, and she was hospitalized for 1 week. She has been on oxygen since then. She is improving. Her next appointment with pulmonology is 01/31/2024. The patient had a hip arthroplasty in 04/2023 that did not go well. She quit smoking on 10/01/2023, and she had been smoking since she was 14 years old. The coughing has mostly resolved.     /82 (BP " "Location: Left arm, Patient Position: Sitting, Cuff Size: Large Adult)   Pulse 68   Ht 177.8 cm (70\")   Wt 110 kg (242 lb 8 oz)   SpO2 96%   Breastfeeding No   BMI 34.80 kg/m²   Body mass index is 34.8 kg/m².      PE:  Physical Exam  Perianal exam: Anterior. She has an anal polyp with an internal hemorrhoid and internal hemorrhoids prolapsing out.       Assessment:   1. External hemorrhoids    2. Internal hemorrhoids           Plan:  - Discussed rubber band ligation is only indicated for internal hemorrhoids.  She has anal tags and external hemorrhoids which would not be treated appropriately with rubber band ligation.  I discussed with patient informed that some of the tissue can be removed, but we are not able to remove all the hemorrhoids due risk of sphincter injury or anal stenosis.  She will be scheduled for anal tag removal and ligation of internal hemorrhoids . Surgery will be schedule after pulmonology has given guidance .     Transcribed from ambient dictation for Bartolome Ho MD by Emma Juarez.  01/22/24   13:59 EST    Patient or patient representative verbalized consent to the visit recording.  I have personally performed the services described in this document as transcribed by the above individual, and it is both accurate and complete.    "

## 2024-01-31 ENCOUNTER — HOSPITAL ENCOUNTER (OUTPATIENT)
Dept: CT IMAGING | Facility: HOSPITAL | Age: 64
Discharge: HOME | End: 2024-01-31
Payer: COMMERCIAL

## 2024-01-31 ENCOUNTER — TRANSCRIBE ORDERS (OUTPATIENT)
Dept: ADMINISTRATIVE | Facility: HOSPITAL | Age: 64
End: 2024-01-31
Payer: COMMERCIAL

## 2024-01-31 ENCOUNTER — HOSPITAL ENCOUNTER (OUTPATIENT)
Dept: PULMONOLOGY | Facility: HOSPITAL | Age: 64
Discharge: HOME | End: 2024-01-31
Payer: COMMERCIAL

## 2024-01-31 DIAGNOSIS — R91.1 PULMONARY NODULE: Primary | ICD-10-CM

## 2024-01-31 DIAGNOSIS — R91.1 PULMONARY NODULE: ICD-10-CM

## 2024-01-31 PROCEDURE — 94618 PULMONARY STRESS TESTING: CPT

## 2024-01-31 NOTE — PROCEDURES
Exercise Oximetry    Patient Name:Magda Nath   MRN: [id number]   Date: 01/31/24             ROOM AIR Baseline at rest   SpO2%  98   Heart Rate  72   Blood Pressure         EXERCISE  SpO2% HR Supplemental Oxygen, LPM   1 MINUTE 94 72 RA   2 MINUTES 94 72 RA   3 MINUTES 94 72 RA   4 MINUTES 94 72 RA   5 MINUTES 94 72 RA   6 MINUTES 94 72 RA     Recovery       Time to Baseline/Recovery (minutes) 0   SpO2 % 94   HR 72   Oxygen  RA     Distance Walked (meters) 170 METERS       Pre Post   Dyspnea (Alberto Scale) 0 0   Fatigue (Alberto Scale) 0 0     Comments:

## 2024-02-02 ENCOUNTER — TRANSCRIBE ORDERS (OUTPATIENT)
Dept: ADMINISTRATIVE | Facility: HOSPITAL | Age: 64
End: 2024-02-02
Payer: COMMERCIAL

## 2024-02-02 DIAGNOSIS — R91.1 PULMONARY NODULE: Primary | ICD-10-CM

## 2024-04-08 ENCOUNTER — HOSPITAL ENCOUNTER (OUTPATIENT)
Dept: CT IMAGING | Facility: HOSPITAL | Age: 64
Discharge: HOME | End: 2024-04-08
Admitting: HOSPITALIST
Payer: COMMERCIAL

## 2024-04-08 DIAGNOSIS — R91.1 PULMONARY NODULE: ICD-10-CM

## 2024-04-08 PROCEDURE — 71250 CT THORAX DX C-: CPT

## 2024-04-19 ENCOUNTER — TRANSCRIBE ORDERS (OUTPATIENT)
Dept: ADMINISTRATIVE | Facility: HOSPITAL | Age: 64
End: 2024-04-19
Payer: COMMERCIAL

## 2024-04-19 DIAGNOSIS — R91.1 LUNG NODULE: Primary | ICD-10-CM

## 2024-10-02 ENCOUNTER — PATIENT OUTREACH (OUTPATIENT)
Dept: OTHER | Facility: HOSPITAL | Age: 64
End: 2024-10-02
Payer: COMMERCIAL

## 2024-10-02 DIAGNOSIS — C50.919 MALIGNANT NEOPLASM OF FEMALE BREAST, UNSPECIFIED ESTROGEN RECEPTOR STATUS, UNSPECIFIED LATERALITY, UNSPECIFIED SITE OF BREAST: Primary | ICD-10-CM

## 2024-10-02 NOTE — PROGRESS NOTES
Introductory call placed to Ms. Nath. Left voicemail introducing myself and navigation services. Will call her after her surgery appointment Oct 7th.

## 2024-10-03 ENCOUNTER — PATIENT OUTREACH (OUTPATIENT)
Dept: OTHER | Facility: HOSPITAL | Age: 64
End: 2024-10-03
Payer: COMMERCIAL

## 2024-10-07 ENCOUNTER — OFFICE VISIT (OUTPATIENT)
Dept: SURGERY | Facility: CLINIC | Age: 64
End: 2024-10-07
Payer: COMMERCIAL

## 2024-10-07 VITALS
BODY MASS INDEX: 31.92 KG/M2 | HEIGHT: 70 IN | DIASTOLIC BLOOD PRESSURE: 82 MMHG | SYSTOLIC BLOOD PRESSURE: 122 MMHG | WEIGHT: 223 LBS

## 2024-10-07 DIAGNOSIS — C50.919 MALIGNANT NEOPLASM OF FEMALE BREAST, UNSPECIFIED ESTROGEN RECEPTOR STATUS, UNSPECIFIED LATERALITY, UNSPECIFIED SITE OF BREAST: Primary | ICD-10-CM

## 2024-10-07 NOTE — PROGRESS NOTES
General Surgery Breast Cancer History and Physical Exam     Summary:    Magda Nath is a 63 y.o. lady who presents with a new diagnosis of right breast invasive lobular carcinoma: Grade II,  ER-/[place]-, Her2-; [id number], Stage I.      A multidisciplinary plan has been formulated for the patient:    (1) Breast Surgical Oncology:  -Follow up [employer] 9 panel genetic testing. I will call her with results.   -MRI to evaluate anatomy as well as for surgical planning.   -Nurse navigator consult.   -Surgical plan: She believes she would like to proceed with a right breast wire localized lumpectomy with SLN biopsy with oncoplastic reconstruction.   -Plastic surgery referral placed.    (2) Medical Oncology:  -Will refer postoperatively for evaluation for endocrine therapy and possible need for chemotherapy due to TNBC status. She would like to see Dr. Vallejo.     (3) Radiation Oncology:  -Will refer postoperatively for evaluation for radiation therapy.    Referring Provider: SVETA Bob    Chief Complaint: abnormal breast imaging    History of Present Illness: Ms. Magda Nath is a 63 y.o. year old lady, seen at the request of Francesca Adrian PA for a new diagnosis of right breast cancer.      This was initially detected as an imaging abnormality. She has had annual mammograms each year. She denies any prior history of abnormal mammograms or breast biopsies. Her work-up is detailed in the oncologic history below.     She denies any breast lumps, pain, skin changes, or nipple discharge. She has a family history of breast cancer in a sister (age 60's) and mother (age 65). She denies any family history of ovarian cancer.     Workup of Current Diagnosis:    9/11/2024 Bilateral Breast MRI   FINDINGS:   Amount of Fibroglandular Tissue: Scattered fibroglandular tissue. Background Parenchymal Enhancement: Mild.   RIGHT BREAST:   There is an irregular enhancing mass within the inferior, slightly lateral right breast,  posterior depth at the 7:00 position, 11 cm from the nipple measuring 1.0 x 1.0 x 0.8 cm (AP, transverse, craniocaudal dimensions respectively) (subtraction series, image 102). This demonstrates type III, washout kinetics. A probable correlate is seen on the same day screening mammogram, described is a focal asymmetry.   There is a questionable prominent low right axillary lymph node (subtraction series, image 45, series  image 42). No morphologically suspicious right internal mammary chain lymphadenopathy.  LEFT BREAST:   No suspicious finding is demonstrated within the left breast. No morphologically suspicious left axillary or left internal mammary chain lymphadenopathy.   VISUALIZED CHEST/ABDOMEN:   No suspicious finding is demonstrated within the visualized chest or abdomen.    IMPRESSION:   1. Suspicious right breast mass for which right diagnostic mammogram and ultrasound are recommended.   2.  Questionable prominent right axillary lymph node for which right diagnostic axillary ultrasound is recommended.   3.  No MRI evidence of left breast malignancy.   RECOMMENDATION:   Right diagnostic mammogram and ultrasound, right axillary ultrasound, and ultrasound-guided biopsy/biopsies as clinically indicated.  FINAL ASSESSMENT: BI-RADS Category 4, Suspicious abnormality.  Biopsy should be considered.     9/11/2024 Bilateral Screening Mammogram:  BREAST DENSITY: There are scattered areas of fibroglandular density.   RISK: Tyrer-Glendale risk score is 26%.   FINDINGS:   There is a focal asymmetry within the central, slightly lateral right breast, posterior depth. No suspicious findings are present in the left breast.   IMPRESSION: Right breast focal asymmetry.   RECOMMENDATION:   1.  Right Diagnostic Mammogram and possible ultrasound.   2.  LIFETIME RISK: Due to the patient's increased risk which is calculated based on the history provided, recommend referral to our [facility], annual screening breast MRI,  and annual screening mammography. If you would like to schedule an appointment with our [facility], please call [phone]). The patient had a same day screening breast MRI. Please see separate report for details.   FINAL ASSESSMENT: BI-RADS Category 0: Incomplete     9/13/2024 Right Breast Diagnostic Mammogram and US:   BREAST DENSITY: There are scattered areas of fibroglandular density.   RISK: Tyrer-Byron risk score is 22%.  MAMMOGRAPHIC FINDINGS:   Previously questioned focal asymmetry persists on spot compression imaging and appears to localize to the lower outer right breast, posterior depth. There is a questionable asymmetry within the lateral subareolar region of the right breast.   SONOGRAPHIC FINDINGS:   Real time sonography was performed with permanent image documentation. Targeted sonographic imaging of the lower outer right breast, lateral subareolar region of the right breast, and right axilla was performed.   At the 7:00 position, 10 cm from the nipple there is an irregular hypoechoic mass measuring 1.1 x 1.1 x 0.8 cm. This corresponds to the suspicious MRI and mammographic finding in question.   At the 9:00 position, 3 cm from the nipple there are 2 adjacent predominantly anechoic masses measuring up to 0.3 cm each. This is felt to correspond to asymmetry seen mammographically. This is favored to represent focal fibrocystic type changes, however the margins are slightly microlobulated.   Targeted sonographic imaging of the right axilla demonstrates 1 prominent lymph node with the cortex measuring 0.5 cm. This is felt to correspond to the questionable prominent lymph nodes seen on recent breast MRI.   Benign-appearing cyst is noted at the 9:00 position, 1 cm from the nipple.   IMPRESSION:   1. Suspicious right breast mass 7:00 position, 10 cm from the nipple for which ultrasound-guided biopsy is recommended.   2.  Indeterminate right breast masses 9:00 position, 3 cm from the  nipple for which ultrasound-guided biopsy is recommended.   3.  Indeterminate right axillary lymph node for which ultrasound-guided biopsy is recommended.   RECOMMENDATION: Three site right breast/axilla ultrasound guided biopsies as detailed above.   Results and recommendations were discussed with the patient by Dr. Zarate at the end of the examination.   FINAL ASSESSMENT: BI-RADS Category 4, Suspicious abnormality.  Biopsy should be considered.     9/13/2024 Right Breast US Guided Biopsy:   Right breast 9:00 3 cm from the nipple: Benign breast tissue with apocrine metaplasia and adenosis, pathology is benign and concordant   Axilla, right lymph node: Lymph node, negative for metastatic carcinoma. Pathology is benign and may be concordant.   Right breast 7:00 10 cm from nipple: Invasive lobular carcinoma with focal  pleomorphic and upper in features. Pathology is malignant and concordant.   Recommendation is for surgical consultation for further management and treatment of right breast biopsy-proven invasive lobular carcinoma at 7:00, and appropriate staging of the right axilla. I phoned the patient with all biopsy results and recommendations on 09/18/2024 at 1:40 PM. Patient may wish to be seen through the cancer center, however she may also consider an outside referral closer to her house. Therefore, appropriate member of the breast care team will also called and notify the patient's referring provider (patient gave physician assistant Ashok ) in [facility] as an outside referral to a surgeon may be indicated. The patient is aware a surgical consultation is recommended and if not   scheduled through the [facility], she should follow-up with her referring provider for appropriate referral.     9/13/2024 Pathology:   A.  BREAST, RIGHT, 9:00 3CM FN, CORE NEEDLE BIOPSY:   - Benign breast tissue with apocrine metaplasia and adenosis.     B.  AXILLA, RIGHT LYMPH NODE, CORE NEEDLE BIOPSY:   - Lymph node,  negative for metastatic carcinoma.     C.  BREAST, RIGHT, 7:00, 10CMFN, CORE NEEDLE BIOPSY:   - INVASIVE LOBULAR CARCINOMA WITH FOCAL PLEOMORPHIC AND APOCRINE FEATURES.   Summary:   - Histologic grade:  II (tubule score 3, nuclear score 2, mitoses score 1)   - Size of largest focus: 8.5 mm   -  Lymphovascular invasion : Not identified.   - Ductal  carcinoma in  situ:  Not identified.   - Lobular  neoplasia i n situ: Not identified.   - Biomarkers:        - Estrogen receptor:     Negative (0%)        - Progesterone receptor:     Negative (0%)        - Her-2/vandana by IHC:     Negative (1+)        - Ki-67:     5%        - Androgen Receptor:     Positive     Gynecologic History:   . P:2 AB:0  Age at first childbirth: 22  Lactation/How long: yes x6 weeks  Age at menarche: 16  Age at menopause: hysterectomy at age 41  Total years of oral contraceptive use: 15 years previously  Total years of hormone replacement therapy: estradiol, stopped at diagnosis     Past Medical History:   HTN  DM   Osteoarthritis     Past Surgical History:    Colonoscopy   Hemorrhoidectomy   Pelvic floor repair   Hysterectomy   Rectocele repair   Left total hip arthroplasty   Urethra surgery     Family History:    As above    Social History:  Denies tobacco use, few cigarettes since diagnosis, on nicotine patch   Occasional alcohol use    Allergies:   Allergies   Allergen Reactions    Erythromycin Rash     ALL MYCINS BREAK HER OUT    Nitrofurantoin Headache and Rash     CAUSES FEVER, HEADACHES    Codeine Headache     headaches     Medications:     Current Outpatient Medications:     albuterol (PROVENTIL) (2.5 MG/3ML) 0.083% nebulizer solution, Take 2.5 mg by nebulization 4 (Four) Times a Day., Disp: , Rfl:     albuterol sulfate HFA (Proventil HFA) 108 (90 Base) MCG/ACT inhaler, Inhale 2 puffs Every 4 (Four) Hours As Needed for Wheezing., Disp: 8.5 g, Rfl: 1    Ascorbic Acid (Vitamin C) 500 MG capsule, Take 500 mg by mouth Daily., Disp: , Rfl:      aspirin 81 MG EC tablet, Take 1 tablet by mouth Daily., Disp: , Rfl:     atorvastatin (LIPITOR) 40 MG tablet, Take 1 tablet by mouth Daily., Disp: , Rfl:     B Complex Vitamins (vitamin b complex) capsule capsule, Take  by mouth Daily., Disp: , Rfl:     busPIRone (BUSPAR) 5 MG tablet, Take 1 tablet by mouth 3 (Three) Times a Day As Needed (anxiety)., Disp: , Rfl:     Cholecalciferol 25 MCG (1000 UT) tablet, Take 1 tablet by mouth Daily., Disp: , Rfl:     Continuous Blood Gluc Sensor (Dexcom G6 Sensor), , Disp: , Rfl:     Continuous Blood Gluc Transmit (Dexcom G6 Transmitter) misc, , Disp: , Rfl:     cyclobenzaprine (FLEXERIL) 10 MG tablet, Take 1 tablet by mouth At Night As Needed for Muscle Spasms., Disp: , Rfl:     estradiol (ESTRACE) 2 MG tablet, Take 1 tablet by mouth Daily., Disp: , Rfl:     ferrous sulfate 325 (65 FE) MG tablet, Take 1 tablet by mouth 2 (Two) Times a Day With Meals., Disp: , Rfl:     furosemide (LASIX) 20 MG tablet, Take 1 tablet by mouth Daily As Needed (swelling)., Disp: , Rfl:     Hydrocortisone, Perianal, (Anusol-HC) 2.5 % rectal cream, Apply rectally 3 times daily.  Include applicator., Disp: 30 g, Rfl: 1    Insulin Glargine (BASAGLAR KWIKPEN SC), Inject 60 Units under the skin into the appropriate area as directed Daily., Disp: , Rfl:     ipratropium-albuterol (DUO-NEB) 0.5-2.5 mg/3 ml nebulizer, Take 3 mL by nebulization Every 4 (Four) Hours As Needed for Wheezing., Disp: 90 mL, Rfl: 1    levocetirizine (XYZAL) 5 MG tablet, Take 1 tablet by mouth Every Evening., Disp: , Rfl:     lisinopril (PRINIVIL,ZESTRIL) 10 MG tablet, Take 1 tablet by mouth Daily., Disp: , Rfl:     meloxicam (MOBIC) 15 MG tablet, Take 1 tablet by mouth Daily., Disp: , Rfl:     nicotine (NICODERM CQ) 21 MG/24HR patch, Place 1 patch on the skin as directed by provider Daily., Disp: 30 patch, Rfl: 0    Symbicort 160-4.5 MCG/ACT inhaler, Inhale 2 puffs 2 (Two) Times a Day., Disp: , Rfl:     zolpidem (Ambien) 10 MG  tablet, Take 1 tablet by mouth At Night As Needed for Sleep., Disp: , Rfl:     Laboratory Values:    Labs from 8/15/2024 reviewed by me     Review of Systems:   Influenza-like illness: no fever, no  cough, no  sore throat, no  body aches, no loss of sense of taste or smell, no known exposure to person with Covid-19.  Constitutional: Negative for fevers or chills  HENT: Negative for hearing loss or runny nose  Eyes: Negative for vision changes or scleral icterus  Respiratory: Negative for cough or shortness of breath  Cardiovascular: Negative for chest pain or heart palpitations  Gastrointestinal: Negative for abdominal pain, nausea, vomiting, constipation, melena, or hematochezia  Genitourinary: Negative for hematuria or dysuria  Musculoskeletal: Negative for joint swelling or gait instability  Neurologic: Negative for tremors or seizures  Psychiatric: Negative for suicidal ideations or depression  All other systems reviewed and negative    Physical Exam:   ECO - Asymptomatic  Constitutional: Well-developed well-nourished, no acute distress  Eyes: Conjunctiva normal, sclera nonicteric  ENMT: Hearing grossly normal, oral mucosa moist  Neck: Supple, no palpable mass, trachea midline  Respiratory: Clear to auscultation, normal inspiratory effort  Cardiovascular: Regular rate, no peripheral edema, no jugular venous distention  Breast: symmetric  Right: No visible abnormalities on inspection while seated, with arms raised or hands on hips. No masses, skin changes, or nipple abnormalities.  Left: No visible abnormalities on inspection while seated, with arms raised or hands on hips. No masses, skin changes, or nipple abnormalities.  Biopsy site appreciated in the right breast, otherwise no skin changes.   No clinical chest wall involvement.  Gastrointestinal: Soft, nontender  Lymphatics (palpable nodes): No cervical, supraclavicular or axillary lymphadenopathy  Skin:  Warm, dry, no rash on visualized skin  surfaces  Musculoskeletal: Symmetric strength, normal gait  Psychiatric: Alert and oriented ×3, normal affect       Discussion:  I had an extensive discussion with the patient and her family about the nature of her breast cancer diagnosis. We reviewed the components of breast tissue including ducts and lobules. We reviewed her pathology report in detail. We reviewed breast cancer histology, including stage, grade, ER/AK receptors, HER2 receptors and how this applies to her diagnosis. We reviewed the basics of systemic and local/regional management of breast cancer.     We discussed that most breast cancer is not hereditary, however given her family history, this may play a role in her case. I believe genetic testing is warranted and could affect surgical decision making.     We reviewed potential surgical treatments to include partial mastectomy, mastectomy, sentinel lymph node biopsy and axillary node dissection and discussed the rationale associated with each approach. Regarding radiation therapy, we discussed that radiation is indicated in all cases of breast conservation and in only limited circumstances following mastectomy. We discussed that the primary goal of adjuvant radiation is to decrease the likelihood of local recurrence.     In her case, she is a good candidate for lumpectomy and she would like to proceed with breast conservation. We discussed that lumpectomy would require preoperative wire-localization. We also discussed the risk of positive margins and that she must have negative margins for lumpectomy to be an appropriate oncologic procedure. I will make every effort to obtain negative margins at her initial operation, but there is a 10-15% chance that she will require a second operation for re-excision, or possibly a total mastectomy. We will not know the margin status until after her final pathology has returned.     We discussed axillary staging. I described the procedure for sentinel lymph node  biopsy in detail, including the preoperative injection of technetium sulfur colloid and intraoperative injection of lymphazurin blue dye. I explained that this is a mapping test and not a cancer test, that all of the lymph nodes containing these dyes will be removed for complete testing by pathology, and that the results could impact the decision for adjuvant treatment or additional surgery.    I described additional risks and potential complications associated with surgery, including, but not limited to, bleeding, infection, complications related to blue dye, lymphedema, deformity/poor cosmetic result, chronic pain, neurovascular injury, numbness, seroma, hematoma, deep venous thrombosis, skin flap necrosis, disease recurrence and the possibility of requiring additional surgery. We also discussed other treatment options including the option of not undergoing any surgical treatment and the risks associated with this including disease progression. She expressed an understanding of these factors and wished to proceed.    We discussed that in her case, systemic treatment would involve endocrine therapy and possibly chemotherapy. She will be referred to medical oncology postoperatively to discuss this further.     KOBI MOORE M.D.  General and Endoscopic Surgery  [facility]    [address], [address]  [place], [place], [address]  P: [phone]  F: [phone]

## 2024-10-08 ENCOUNTER — PATIENT OUTREACH (OUTPATIENT)
Dept: OTHER | Facility: HOSPITAL | Age: 64
End: 2024-10-08
Payer: COMMERCIAL

## 2024-10-08 NOTE — PROGRESS NOTES
Referral received from Dr. Kaur's office. I called Ms. Nath and introduced myself and navigational services. She has a new diagnosis of right breast invasive lobular carcinoma; triple negative; Ki-67 of 5%.       She stated the consult with Dr. Kaur went well and she is leaning toward a lumpectomy. She has a good understanding of her pathology and treatment options. She was able to verbalize teach back on her plan of care. She is waiting to hear about her appointment with Dr. Vallejo and with plastic surgery.      She stated she has a wonderful support system with her  and friends. She stated she feels comfortable talking to them about needs or issues.      She stated she has no financial or transportation concerns at this time. She has no resource needs or ongoing concerns at this time.      She stated she is doing well emotionally at this time.  We discussed we have support options if the need arises. She was thankful for the information.      We discussed integrative therapies and other services at [facility]. She will received a navigation folder with the following information in the mail:     [facility], Cancer and Restorative Exercise (CARE), [employer] Exercise program, Guide for the Newly Diagnosed, [employer], [facility], Massage Therapy, Reiki Therapy, [employer] [place], Cancer Nutrition, and Survivorship Clinic.     She verbalized appreciation for navigational services and she has my contact information and will call with any questions that arise.

## 2024-10-11 ENCOUNTER — PATIENT OUTREACH (OUTPATIENT)
Dept: OTHER | Facility: HOSPITAL | Age: 64
End: 2024-10-11
Payer: COMMERCIAL

## 2024-10-11 NOTE — PROGRESS NOTES
Called Ms. Nath to let her know Dr. Kaur wants to do surgery first and she will see Dr. Vallejo after surgery. Also let her know their office is working to get her a plastic surgery consult date and will be in touch as soon as they can. She was thankful for the information.

## 2024-10-16 ENCOUNTER — TELEPHONE (OUTPATIENT)
Dept: SURGERY | Facility: CLINIC | Age: 64
End: 2024-10-16
Payer: COMMERCIAL

## 2024-10-16 NOTE — TELEPHONE ENCOUNTER
Spoke with patient and informed her result of genetic testing is negative.  She voiced understanding.

## 2024-10-18 ENCOUNTER — PREP FOR SURGERY (OUTPATIENT)
Dept: OTHER | Facility: HOSPITAL | Age: 64
End: 2024-10-18
Payer: COMMERCIAL

## 2024-10-18 DIAGNOSIS — C50.919 MALIGNANT NEOPLASM OF FEMALE BREAST, UNSPECIFIED ESTROGEN RECEPTOR STATUS, UNSPECIFIED LATERALITY, UNSPECIFIED SITE OF BREAST: Primary | ICD-10-CM

## 2024-10-18 RX ORDER — DIAZEPAM 5 MG
5 TABLET ORAL ONCE
OUTPATIENT
Start: 2024-10-18 | End: 2024-10-18

## 2024-10-21 ENCOUNTER — PATIENT OUTREACH (OUTPATIENT)
Dept: OTHER | Facility: HOSPITAL | Age: 64
End: 2024-10-21
Payer: COMMERCIAL

## 2024-10-21 ENCOUNTER — PREP FOR SURGERY (OUTPATIENT)
Dept: OTHER | Facility: HOSPITAL | Age: 64
End: 2024-10-21
Payer: COMMERCIAL

## 2024-10-21 DIAGNOSIS — C50.919 MALIGNANT NEOPLASM OF FEMALE BREAST, UNSPECIFIED ESTROGEN RECEPTOR STATUS, UNSPECIFIED LATERALITY, UNSPECIFIED SITE OF BREAST: Primary | ICD-10-CM

## 2024-10-21 RX ORDER — DIAZEPAM 5 MG
5 TABLET ORAL ONCE
OUTPATIENT
Start: 2024-10-21 | End: 2024-10-21

## 2024-10-21 NOTE — PROGRESS NOTES
Ms. Nath called with questions about plastic surgery and timelines. Will speak with Dr. Kaur's office to see what options there are. She was thankful for the help

## 2024-10-22 ENCOUNTER — LAB REQUISITION (OUTPATIENT)
Dept: LAB | Facility: HOSPITAL | Age: 64
End: 2024-10-22
Payer: COMMERCIAL

## 2024-10-22 DIAGNOSIS — C50.911 MALIGNANT NEOPLASM OF UNSPECIFIED SITE OF RIGHT FEMALE BREAST: ICD-10-CM

## 2024-10-22 PROCEDURE — 88360 TUMOR IMMUNOHISTOCHEM/MANUAL: CPT | Performed by: STUDENT IN AN ORGANIZED HEALTH CARE EDUCATION/TRAINING PROGRAM

## 2024-10-22 PROCEDURE — 88342 IMHCHEM/IMCYTCHM 1ST ANTB: CPT | Performed by: STUDENT IN AN ORGANIZED HEALTH CARE EDUCATION/TRAINING PROGRAM

## 2024-10-22 PROCEDURE — 88341 IMHCHEM/IMCYTCHM EA ADD ANTB: CPT | Performed by: STUDENT IN AN ORGANIZED HEALTH CARE EDUCATION/TRAINING PROGRAM

## 2024-10-28 LAB
LAB AP CASE REPORT: NORMAL
LAB AP DIAGNOSIS COMMENT: NORMAL
LAB AP SPECIAL STAINS: NORMAL
PATH REPORT.FINAL DX SPEC: NORMAL
PATH REPORT.GROSS SPEC: NORMAL

## 2024-10-29 ENCOUNTER — PREP FOR SURGERY (OUTPATIENT)
Dept: OTHER | Facility: HOSPITAL | Age: 64
End: 2024-10-29
Payer: COMMERCIAL

## 2024-10-29 DIAGNOSIS — C50.919 MALIGNANT NEOPLASM OF FEMALE BREAST, UNSPECIFIED ESTROGEN RECEPTOR STATUS, UNSPECIFIED LATERALITY, UNSPECIFIED SITE OF BREAST: Primary | ICD-10-CM

## 2024-11-01 DIAGNOSIS — C50.919 MALIGNANT NEOPLASM OF FEMALE BREAST, UNSPECIFIED ESTROGEN RECEPTOR STATUS, UNSPECIFIED LATERALITY, UNSPECIFIED SITE OF BREAST: Primary | ICD-10-CM

## 2024-11-18 ENCOUNTER — TELEPHONE (OUTPATIENT)
Dept: SURGERY | Facility: CLINIC | Age: 64
End: 2024-11-18
Payer: COMMERCIAL

## 2024-11-18 ENCOUNTER — PATIENT OUTREACH (OUTPATIENT)
Dept: OTHER | Facility: HOSPITAL | Age: 64
End: 2024-11-18
Payer: COMMERCIAL

## 2024-11-18 NOTE — TELEPHONE ENCOUNTER
Patient called to find out how to get her [facility] paperwork taken care of. Advised she should have them fax it to the office at [phone].    Patient would like a copy released to her [employer] as well as faxed to the company.

## 2024-11-18 NOTE — PROGRESS NOTES
Ms. Pham called with questions about medical records for work and leave off work. We discussed her needs and transferred her call to Dr. Kaur's office to talk directly with their staff about the specific needs. She was thankful for the help.

## 2024-11-20 ENCOUNTER — TELEPHONE (OUTPATIENT)
Dept: SURGERY | Facility: CLINIC | Age: 64
End: 2024-11-20
Payer: COMMERCIAL

## 2024-11-20 NOTE — TELEPHONE ENCOUNTER
Spoke with pt, told them their [employer] disability paperwork was filled out and faxed over. Fax confirmation and copy of paperwork was scanned into [employer].

## 2024-11-25 ENCOUNTER — PRE-ADMISSION TESTING (OUTPATIENT)
Dept: PREADMISSION TESTING | Facility: HOSPITAL | Age: 64
End: 2024-11-25
Payer: COMMERCIAL

## 2024-11-25 ENCOUNTER — HOSPITAL ENCOUNTER (OUTPATIENT)
Dept: MAMMOGRAPHY | Facility: HOSPITAL | Age: 64
Discharge: HOME | End: 2024-11-25
Payer: COMMERCIAL

## 2024-11-25 ENCOUNTER — HOSPITAL ENCOUNTER (OUTPATIENT)
Dept: ULTRASOUND IMAGING | Facility: HOSPITAL | Age: 64
Discharge: HOME | End: 2024-11-25
Payer: COMMERCIAL

## 2024-11-25 VITALS
RESPIRATION RATE: 20 BRPM | SYSTOLIC BLOOD PRESSURE: 157 MMHG | WEIGHT: 225.3 LBS | TEMPERATURE: 99.3 F | BODY MASS INDEX: 32.25 KG/M2 | HEIGHT: 70 IN | HEART RATE: 74 BPM | OXYGEN SATURATION: 94 % | DIASTOLIC BLOOD PRESSURE: 88 MMHG

## 2024-11-25 DIAGNOSIS — C50.919 MALIGNANT NEOPLASM OF FEMALE BREAST, UNSPECIFIED ESTROGEN RECEPTOR STATUS, UNSPECIFIED LATERALITY, UNSPECIFIED SITE OF BREAST: ICD-10-CM

## 2024-11-25 LAB
ANION GAP SERPL CALCULATED.3IONS-SCNC: 8 MMOL/L (ref 5–15)
BUN SERPL-MCNC: 11 MG/DL (ref 8–23)
BUN/CREAT SERPL: 17.2 (ref 7–25)
CALCIUM SPEC-SCNC: 9.2 MG/DL (ref 8.6–10.5)
CHLORIDE SERPL-SCNC: 102 MMOL/L (ref 98–107)
CO2 SERPL-SCNC: 29 MMOL/L (ref 22–29)
CREAT SERPL-MCNC: 0.64 MG/DL (ref 0.57–1)
DEPRECATED RDW RBC AUTO: 42.4 FL (ref 37–54)
EGFRCR SERPLBLD CKD-EPI 2021: 100.1 ML/MIN/1.73
ERYTHROCYTE [DISTWIDTH] IN BLOOD BY AUTOMATED COUNT: 12.9 % (ref 12.3–15.4)
GLUCOSE SERPL-MCNC: 154 MG/DL (ref 65–99)
HCT VFR BLD AUTO: 49.2 % (ref 34–46.6)
HGB BLD-MCNC: 15.4 G/DL (ref 12–15.9)
MCH RBC QN AUTO: 27.8 PG (ref 26.6–33)
MCHC RBC AUTO-ENTMCNC: 31.3 G/DL (ref 31.5–35.7)
MCV RBC AUTO: 88.8 FL (ref 79–97)
PLATELET # BLD AUTO: 284 10*3/MM3 (ref 140–450)
PMV BLD AUTO: 10.3 FL (ref 6–12)
POTASSIUM SERPL-SCNC: 4.2 MMOL/L (ref 3.5–5.2)
QT INTERVAL: 382 MS
QTC INTERVAL: 410 MS
RBC # BLD AUTO: 5.54 10*6/MM3 (ref 3.77–5.28)
SODIUM SERPL-SCNC: 139 MMOL/L (ref 136–145)
WBC NRBC COR # BLD AUTO: 11.82 10*3/MM3 (ref 3.4–10.8)

## 2024-11-25 PROCEDURE — 93005 ELECTROCARDIOGRAM TRACING: CPT

## 2024-11-25 PROCEDURE — 85027 COMPLETE CBC AUTOMATED: CPT

## 2024-11-25 PROCEDURE — C1728 CATH, BRACHYTX SEED ADM: HCPCS

## 2024-11-25 PROCEDURE — 25010000002 LIDOCAINE 1 % SOLUTION: Performed by: RADIOLOGY

## 2024-11-25 PROCEDURE — 77065 DX MAMMO INCL CAD UNI: CPT

## 2024-11-25 PROCEDURE — 80048 BASIC METABOLIC PNL TOTAL CA: CPT

## 2024-11-25 PROCEDURE — 93010 ELECTROCARDIOGRAM REPORT: CPT | Performed by: INTERNAL MEDICINE

## 2024-11-25 PROCEDURE — 36415 COLL VENOUS BLD VENIPUNCTURE: CPT

## 2024-11-25 RX ORDER — CELECOXIB 200 MG/1
CAPSULE ORAL
COMMUNITY
Start: 2024-11-20

## 2024-11-25 RX ORDER — GABAPENTIN 300 MG/1
CAPSULE ORAL
COMMUNITY
Start: 2024-11-20

## 2024-11-25 RX ORDER — FLUTICASONE FUROATE, UMECLIDINIUM BROMIDE AND VILANTEROL TRIFENATATE 100; 62.5; 25 UG/1; UG/1; UG/1
1 POWDER RESPIRATORY (INHALATION)
COMMUNITY
Start: 2024-11-06

## 2024-11-25 RX ORDER — CEFADROXIL 500 MG/1
CAPSULE ORAL
COMMUNITY
Start: 2024-11-20

## 2024-11-25 RX ORDER — TRAMADOL HYDROCHLORIDE 50 MG/1
TABLET ORAL
COMMUNITY
Start: 2024-11-20

## 2024-11-25 RX ORDER — GLIPIZIDE 2.5 MG/1
2.5 TABLET, EXTENDED RELEASE ORAL DAILY
COMMUNITY
Start: 2024-10-31 | End: 2025-10-31

## 2024-11-25 RX ORDER — ONDANSETRON 4 MG/1
TABLET, ORALLY DISINTEGRATING ORAL
COMMUNITY
Start: 2024-11-20

## 2024-11-25 RX ORDER — LIDOCAINE HYDROCHLORIDE 10 MG/ML
10 INJECTION, SOLUTION INFILTRATION; PERINEURAL ONCE
Status: COMPLETED | OUTPATIENT
Start: 2024-11-25 | End: 2024-11-25

## 2024-11-25 RX ADMIN — LIDOCAINE HYDROCHLORIDE 8 ML: 10 INJECTION, SOLUTION INFILTRATION; PERINEURAL at 10:05

## 2024-11-25 NOTE — DISCHARGE INSTRUCTIONS
Take the following medications the morning of surgery:  INHALER    FOLLOW DR ROSA INSTRUCTIONS REGARDING PRE-OP MEDS       If you are on prescription narcotic pain medication to control your pain you may also take that medication the morning of surgery.      General Instructions:     Do not eat solid food after midnight the night before surgery.  Clear liquids day of surgery are allowed but must be stopped at least two hours before your hospital arrival time.       Allowed clear liquids      Water, sodas, and tea or coffee with no cream or milk added.       12 to 20 ounces of a clear liquid that contains carbohydrates is recommended.  If non-diabetic, have Gatorade or Powerade.  If diabetic, have G2 or Powerade Zero.     Do not have liquids red in color.  Do not consume chicken, beef, pork or vegetable broth or bouillon cubes of any variety as they are not considered clear liquids and are not allowed.      Infants may have breast milk up to four hours before surgery.  Infants drinking formula may drink formula up to six hours before surgery.   Patients who avoid smoking, chewing tobacco and alcohol for 4 weeks prior to surgery have a reduced risk of post-operative complications.  Quit smoking as many days before surgery as you can.  Do not smoke, use chewing tobacco or drink alcohol the day of surgery.   If applicable bring your C-PAP/ BI-PAP machine in with you to preop day of surgery.  Bring any papers given to you in the doctor’s office.  Wear clean comfortable clothes.  Do not wear contact lenses, false eyelashes or make-up.  Bring a case for your glasses.   Bring crutches or walker if applicable.  Remove all piercings.  Leave jewelry and any other valuables at home.  Hair extensions with metal clips must be removed prior to surgery.  The Pre-Admission Testing nurse will instruct you to bring medications if unable to obtain an accurate list in Pre-Admission Testing.            Preventing a Surgical Site  Infection:  For 2 to 3 days before surgery, avoid shaving with a razor because the razor can irritate skin and make it easier to develop an infection.    Any areas of open skin can increase the risk of a post-operative wound infection by allowing bacteria to enter and travel throughout the body.  Notify your surgeon if you have any skin wounds / rashes even if it is not near the expected surgical site.  The area will need assessed to determine if surgery should be delayed until it is healed.  The night prior to surgery shower using a fresh bar of anti-bacterial soap (such as Dial) and clean washcloth.  Sleep in a clean bed with clean clothing.  Do not allow pets to sleep with you.  Shower on the morning of surgery using a fresh bar of anti-bacterial soap (such as Dial) and clean washcloth.  Dry with a clean towel and dress in clean clothing.  Ask your surgeon if you will be receiving antibiotics prior to surgery.  Make sure you, your family, and all healthcare providers clean their hands with soap and water or an alcohol based hand  before caring for you or your wound.    Day of surgery:  Your arrival time is approximately two hours before your scheduled surgery time.  Please note if you have an early arrival time the surgery doors do not open before 5:00 AM.  Upon arrival, a Pre-op nurse and Anesthesiologist will review your health history, obtain vital signs, and answer questions you may have.  The only belongings needed at this time will be a list of your home medications and if applicable your C-PAP/BI-PAP machine.  A Pre-op nurse will start an IV and you may receive medication in preparation for surgery, including something to help you relax.     Please be aware that surgery does come with discomfort.  We want to make every effort to control your discomfort so please discuss any uncontrolled symptoms with your nurse.   Your doctor will most likely have prescribed pain medications.      If you are going  home after surgery you will receive individualized written care instructions before being discharged.  A responsible adult must drive you to and from the hospital on the day of your surgery and ideally stay with you through the night.   .  Discharge prescriptions can be filled by the hospital pharmacy during regular pharmacy hours.  If you are having surgery late in the day/evening your prescription may be e-prescribed to your pharmacy.  Please verify your pharmacy hours or chose a 24 hour pharmacy to avoid not having access to your prescription because your pharmacy has closed for the day.    If you are staying overnight following surgery, you will be transported to your hospital room following the recovery period.  [facility] has all private rooms.    If you have any questions please call Pre-Admission Testing at [phone].  Deductibles and co-payments are collected on the day of service. Please be prepared to pay the required co-pay, deductible or deposit on the day of service as defined by your plan.    Call your surgeon immediately if you experience any of the following symptoms:  Sore Throat  Shortness of Breath or difficulty breathing  Cough  Chills  Body soreness or muscle pain  Headache  Fever  New loss of taste or smell  Do not arrive for your surgery ill.  Your procedure will need to be rescheduled to another time.  You will need to call your physician before the day of surgery to avoid any unnecessary exposure to hospital staff as well as other patients.

## 2024-12-04 ENCOUNTER — ANESTHESIA EVENT (OUTPATIENT)
Dept: PERIOP | Facility: HOSPITAL | Age: 64
End: 2024-12-04
Payer: COMMERCIAL

## 2024-12-04 ENCOUNTER — ANCILLARY PROCEDURE (OUTPATIENT)
Dept: LAB | Facility: HOSPITAL | Age: 64
End: 2024-12-04
Payer: COMMERCIAL

## 2024-12-04 ENCOUNTER — HOSPITAL ENCOUNTER (OUTPATIENT)
Facility: HOSPITAL | Age: 64
Setting detail: HOSPITAL OUTPATIENT SURGERY
Discharge: HOME | End: 2024-12-04
Attending: STUDENT IN AN ORGANIZED HEALTH CARE EDUCATION/TRAINING PROGRAM | Admitting: STUDENT IN AN ORGANIZED HEALTH CARE EDUCATION/TRAINING PROGRAM
Payer: COMMERCIAL

## 2024-12-04 ENCOUNTER — HOSPITAL ENCOUNTER (OUTPATIENT)
Dept: NUCLEAR MEDICINE | Facility: HOSPITAL | Age: 64
Setting detail: HOSPITAL OUTPATIENT SURGERY
Discharge: HOME | End: 2024-12-04
Payer: COMMERCIAL

## 2024-12-04 ENCOUNTER — APPOINTMENT (OUTPATIENT)
Dept: GENERAL RADIOLOGY | Facility: HOSPITAL | Age: 64
End: 2024-12-04
Payer: COMMERCIAL

## 2024-12-04 ENCOUNTER — TRANSCRIBE ORDERS (OUTPATIENT)
Dept: LAB | Facility: HOSPITAL | Age: 64
End: 2024-12-04
Payer: COMMERCIAL

## 2024-12-04 ENCOUNTER — ANESTHESIA (OUTPATIENT)
Dept: PERIOP | Facility: HOSPITAL | Age: 64
End: 2024-12-04
Payer: COMMERCIAL

## 2024-12-04 VITALS
TEMPERATURE: 98.5 F | HEART RATE: 91 BPM | RESPIRATION RATE: 20 BRPM | OXYGEN SATURATION: 91 % | DIASTOLIC BLOOD PRESSURE: 75 MMHG | SYSTOLIC BLOOD PRESSURE: 139 MMHG

## 2024-12-04 DIAGNOSIS — C50.911 INVASIVE LOBULAR CARCINOMA OF RIGHT BREAST IN FEMALE: ICD-10-CM

## 2024-12-04 DIAGNOSIS — C50.919 MALIGNANT NEOPLASM OF FEMALE BREAST, UNSPECIFIED ESTROGEN RECEPTOR STATUS, UNSPECIFIED LATERALITY, UNSPECIFIED SITE OF BREAST: ICD-10-CM

## 2024-12-04 DIAGNOSIS — C50.911 INVASIVE LOBULAR CARCINOMA OF RIGHT BREAST IN FEMALE: Primary | ICD-10-CM

## 2024-12-04 LAB
GLUCOSE BLDC GLUCOMTR-MCNC: 115 MG/DL (ref 70–130)
GLUCOSE BLDC GLUCOMTR-MCNC: 172 MG/DL (ref 70–130)
GLUCOSE BLDC GLUCOMTR-MCNC: 96 MG/DL (ref 70–130)

## 2024-12-04 PROCEDURE — 88307 TISSUE EXAM BY PATHOLOGIST: CPT | Performed by: STUDENT IN AN ORGANIZED HEALTH CARE EDUCATION/TRAINING PROGRAM

## 2024-12-04 PROCEDURE — 88305 TISSUE EXAM BY PATHOLOGIST: CPT | Performed by: SURGERY

## 2024-12-04 PROCEDURE — 25010000002 HYDROMORPHONE 1 MG/ML SOLUTION

## 2024-12-04 PROCEDURE — 76098 X-RAY EXAM SURGICAL SPECIMEN: CPT

## 2024-12-04 PROCEDURE — 19301 PARTIAL MASTECTOMY: CPT | Performed by: SPECIALIST/TECHNOLOGIST, OTHER

## 2024-12-04 PROCEDURE — 25810000003 LACTATED RINGERS PER 1000 ML

## 2024-12-04 PROCEDURE — 25010000003 BUPIVACAINE LIPOSOME 1.3 % SUSPENSION 20 ML VIAL: Performed by: SURGERY

## 2024-12-04 PROCEDURE — 38900 IO MAP OF SENT LYMPH NODE: CPT | Performed by: SPECIALIST/TECHNOLOGIST, OTHER

## 2024-12-04 PROCEDURE — 82948 REAGENT STRIP/BLOOD GLUCOSE: CPT

## 2024-12-04 PROCEDURE — 25010000002 SUGAMMADEX 200 MG/2ML SOLUTION: Performed by: STUDENT IN AN ORGANIZED HEALTH CARE EDUCATION/TRAINING PROGRAM

## 2024-12-04 PROCEDURE — C9290 INJ, BUPIVACAINE LIPOSOME: HCPCS | Performed by: SURGERY

## 2024-12-04 PROCEDURE — 25010000002 MAGNESIUM SULFATE PER 500 MG OF MAGNESIUM

## 2024-12-04 PROCEDURE — A9520 TC99 TILMANOCEPT DIAG 0.5MCI: HCPCS | Performed by: STUDENT IN AN ORGANIZED HEALTH CARE EDUCATION/TRAINING PROGRAM

## 2024-12-04 PROCEDURE — 25010000002 BUPIVACAINE (PF) 0.25 % SOLUTION 10 ML VIAL: Performed by: SURGERY

## 2024-12-04 PROCEDURE — 94640 AIRWAY INHALATION TREATMENT: CPT

## 2024-12-04 PROCEDURE — 25010000002 KETOROLAC TROMETHAMINE PER 15 MG: Performed by: STUDENT IN AN ORGANIZED HEALTH CARE EDUCATION/TRAINING PROGRAM

## 2024-12-04 PROCEDURE — 38900 IO MAP OF SENT LYMPH NODE: CPT | Performed by: STUDENT IN AN ORGANIZED HEALTH CARE EDUCATION/TRAINING PROGRAM

## 2024-12-04 PROCEDURE — 38525 BIOPSY/REMOVAL LYMPH NODES: CPT | Performed by: STUDENT IN AN ORGANIZED HEALTH CARE EDUCATION/TRAINING PROGRAM

## 2024-12-04 PROCEDURE — 38792 RA TRACER ID OF SENTINL NODE: CPT

## 2024-12-04 PROCEDURE — 25010000002 PROPOFOL 10 MG/ML EMULSION

## 2024-12-04 PROCEDURE — 25810000003 LACTATED RINGERS PER 1000 ML: Performed by: ANESTHESIOLOGY

## 2024-12-04 PROCEDURE — 19301 PARTIAL MASTECTOMY: CPT | Performed by: STUDENT IN AN ORGANIZED HEALTH CARE EDUCATION/TRAINING PROGRAM

## 2024-12-04 PROCEDURE — 25010000002 HYDROCORTISONE SOD SUC (PF) 100 MG RECONSTITUTED SOLUTION: Performed by: ANESTHESIOLOGY

## 2024-12-04 PROCEDURE — 25010000002 HYDROMORPHONE PER 4 MG

## 2024-12-04 PROCEDURE — 88341 IMHCHEM/IMCYTCHM EA ADD ANTB: CPT | Performed by: STUDENT IN AN ORGANIZED HEALTH CARE EDUCATION/TRAINING PROGRAM

## 2024-12-04 PROCEDURE — 25010000002 ISOSULFAN BLUE 1 % SOLUTION: Performed by: STUDENT IN AN ORGANIZED HEALTH CARE EDUCATION/TRAINING PROGRAM

## 2024-12-04 PROCEDURE — 25010000002 ONDANSETRON PER 1 MG

## 2024-12-04 PROCEDURE — 88342 IMHCHEM/IMCYTCHM 1ST ANTB: CPT | Performed by: STUDENT IN AN ORGANIZED HEALTH CARE EDUCATION/TRAINING PROGRAM

## 2024-12-04 PROCEDURE — 34310000005 TECHETIUM TC99M TILMANOCEPT: Performed by: STUDENT IN AN ORGANIZED HEALTH CARE EDUCATION/TRAINING PROGRAM

## 2024-12-04 PROCEDURE — 25010000002 FENTANYL CITRATE (PF) 50 MCG/ML SOLUTION

## 2024-12-04 PROCEDURE — 25010000002 LIDOCAINE 2% SOLUTION

## 2024-12-04 PROCEDURE — 25010000002 ACETAMINOPHEN 10 MG/ML SOLUTION: Performed by: ANESTHESIOLOGY

## 2024-12-04 PROCEDURE — 25010000002 DEXAMETHASONE PER 1 MG

## 2024-12-04 PROCEDURE — 25010000002 ONDANSETRON PER 1 MG: Performed by: STUDENT IN AN ORGANIZED HEALTH CARE EDUCATION/TRAINING PROGRAM

## 2024-12-04 PROCEDURE — 25010000002 CEFAZOLIN PER 500 MG: Performed by: STUDENT IN AN ORGANIZED HEALTH CARE EDUCATION/TRAINING PROGRAM

## 2024-12-04 DEVICE — LIGACLIP MCA MULTIPLE CLIP APPLIERS, 20 MEDIUM CLIPS
Type: IMPLANTABLE DEVICE | Site: BREAST | Status: FUNCTIONAL
Brand: LIGACLIP

## 2024-12-04 DEVICE — DEV CONTRL TISS STRATAFIXSPIRALMNCRYL PLSPS2 REV3/0 45CM: Type: IMPLANTABLE DEVICE | Site: BREAST | Status: FUNCTIONAL

## 2024-12-04 RX ORDER — DEXAMETHASONE SODIUM PHOSPHATE 4 MG/ML
INJECTION, SOLUTION INTRA-ARTICULAR; INTRALESIONAL; INTRAMUSCULAR; INTRAVENOUS; SOFT TISSUE AS NEEDED
Status: DISCONTINUED | OUTPATIENT
Start: 2024-12-04 | End: 2024-12-04 | Stop reason: SURG

## 2024-12-04 RX ORDER — SODIUM CHLORIDE 0.9 % (FLUSH) 0.9 %
3 SYRINGE (ML) INJECTION EVERY 12 HOURS SCHEDULED
Status: DISCONTINUED | OUTPATIENT
Start: 2024-12-04 | End: 2024-12-05 | Stop reason: HOSPADM

## 2024-12-04 RX ORDER — LIDOCAINE HYDROCHLORIDE 20 MG/ML
INJECTION, SOLUTION INFILTRATION; PERINEURAL AS NEEDED
Status: DISCONTINUED | OUTPATIENT
Start: 2024-12-04 | End: 2024-12-04 | Stop reason: SURG

## 2024-12-04 RX ORDER — LIDOCAINE HYDROCHLORIDE 10 MG/ML
0.5 INJECTION, SOLUTION INFILTRATION; PERINEURAL ONCE AS NEEDED
Status: DISCONTINUED | OUTPATIENT
Start: 2024-12-04 | End: 2024-12-05 | Stop reason: HOSPADM

## 2024-12-04 RX ORDER — DIPHENHYDRAMINE HYDROCHLORIDE 50 MG/ML
12.5 INJECTION INTRAMUSCULAR; INTRAVENOUS
Status: DISCONTINUED | OUTPATIENT
Start: 2024-12-04 | End: 2024-12-05 | Stop reason: HOSPADM

## 2024-12-04 RX ORDER — PROPOFOL 10 MG/ML
VIAL (ML) INTRAVENOUS AS NEEDED
Status: DISCONTINUED | OUTPATIENT
Start: 2024-12-04 | End: 2024-12-04 | Stop reason: SURG

## 2024-12-04 RX ORDER — NALOXONE HCL 0.4 MG/ML
0.2 VIAL (ML) INJECTION AS NEEDED
Status: DISCONTINUED | OUTPATIENT
Start: 2024-12-04 | End: 2024-12-05 | Stop reason: HOSPADM

## 2024-12-04 RX ORDER — PROMETHAZINE HYDROCHLORIDE 25 MG/1
25 TABLET ORAL ONCE AS NEEDED
Status: DISCONTINUED | OUTPATIENT
Start: 2024-12-04 | End: 2024-12-05 | Stop reason: HOSPADM

## 2024-12-04 RX ORDER — MAGNESIUM SULFATE HEPTAHYDRATE 500 MG/ML
INJECTION, SOLUTION INTRAMUSCULAR; INTRAVENOUS AS NEEDED
Status: DISCONTINUED | OUTPATIENT
Start: 2024-12-04 | End: 2024-12-04 | Stop reason: SURG

## 2024-12-04 RX ORDER — PROPOFOL 10 MG/ML
INJECTION, EMULSION INTRAVENOUS AS NEEDED
Status: DISCONTINUED | OUTPATIENT
Start: 2024-12-04 | End: 2024-12-04

## 2024-12-04 RX ORDER — FENTANYL CITRATE 50 UG/ML
INJECTION, SOLUTION INTRAMUSCULAR; INTRAVENOUS AS NEEDED
Status: DISCONTINUED | OUTPATIENT
Start: 2024-12-04 | End: 2024-12-04 | Stop reason: SURG

## 2024-12-04 RX ORDER — FLUMAZENIL 0.1 MG/ML
0.2 INJECTION INTRAVENOUS AS NEEDED
Status: DISCONTINUED | OUTPATIENT
Start: 2024-12-04 | End: 2024-12-05 | Stop reason: HOSPADM

## 2024-12-04 RX ORDER — LIDOCAINE HYDROCHLORIDE 40 MG/ML
SOLUTION TOPICAL AS NEEDED
Status: DISCONTINUED | OUTPATIENT
Start: 2024-12-04 | End: 2024-12-04 | Stop reason: SURG

## 2024-12-04 RX ORDER — SODIUM CHLORIDE, SODIUM LACTATE, POTASSIUM CHLORIDE, CALCIUM CHLORIDE 600; 310; 30; 20 MG/100ML; MG/100ML; MG/100ML; MG/100ML
INJECTION, SOLUTION INTRAVENOUS CONTINUOUS PRN
Status: DISCONTINUED | OUTPATIENT
Start: 2024-12-04 | End: 2024-12-04 | Stop reason: SURG

## 2024-12-04 RX ORDER — ACETAMINOPHEN 10 MG/ML
1000 INJECTION, SOLUTION INTRAVENOUS ONCE
Status: COMPLETED | OUTPATIENT
Start: 2024-12-04 | End: 2024-12-04

## 2024-12-04 RX ORDER — ONDANSETRON 2 MG/ML
4 INJECTION INTRAMUSCULAR; INTRAVENOUS ONCE AS NEEDED
Status: COMPLETED | OUTPATIENT
Start: 2024-12-04 | End: 2024-12-04

## 2024-12-04 RX ORDER — FENTANYL CITRATE 50 UG/ML
50 INJECTION, SOLUTION INTRAMUSCULAR; INTRAVENOUS
Status: DISCONTINUED | OUTPATIENT
Start: 2024-12-04 | End: 2024-12-05 | Stop reason: HOSPADM

## 2024-12-04 RX ORDER — TRAMADOL HYDROCHLORIDE 50 MG/1
50 TABLET ORAL ONCE AS NEEDED
Status: COMPLETED | OUTPATIENT
Start: 2024-12-04 | End: 2024-12-04

## 2024-12-04 RX ORDER — IPRATROPIUM BROMIDE AND ALBUTEROL SULFATE 2.5; .5 MG/3ML; MG/3ML
3 SOLUTION RESPIRATORY (INHALATION) ONCE
Status: COMPLETED | OUTPATIENT
Start: 2024-12-04 | End: 2024-12-04

## 2024-12-04 RX ORDER — DROPERIDOL 2.5 MG/ML
0.62 INJECTION, SOLUTION INTRAMUSCULAR; INTRAVENOUS
Status: DISCONTINUED | OUTPATIENT
Start: 2024-12-04 | End: 2024-12-05 | Stop reason: HOSPADM

## 2024-12-04 RX ORDER — KETOROLAC TROMETHAMINE 30 MG/ML
INJECTION, SOLUTION INTRAMUSCULAR; INTRAVENOUS AS NEEDED
Status: DISCONTINUED | OUTPATIENT
Start: 2024-12-04 | End: 2024-12-04 | Stop reason: SURG

## 2024-12-04 RX ORDER — EPHEDRINE SULFATE 50 MG/ML
5 INJECTION, SOLUTION INTRAVENOUS ONCE AS NEEDED
Status: DISCONTINUED | OUTPATIENT
Start: 2024-12-04 | End: 2024-12-05 | Stop reason: HOSPADM

## 2024-12-04 RX ORDER — HYDROCODONE BITARTRATE AND ACETAMINOPHEN 7.5; 325 MG/1; MG/1
1 TABLET ORAL ONCE AS NEEDED
Status: DISCONTINUED | OUTPATIENT
Start: 2024-12-04 | End: 2024-12-05 | Stop reason: HOSPADM

## 2024-12-04 RX ORDER — HYDRALAZINE HYDROCHLORIDE 20 MG/ML
5 INJECTION INTRAMUSCULAR; INTRAVENOUS
Status: DISCONTINUED | OUTPATIENT
Start: 2024-12-04 | End: 2024-12-05 | Stop reason: HOSPADM

## 2024-12-04 RX ORDER — DIAZEPAM 5 MG/1
5 TABLET ORAL ONCE
Status: COMPLETED | OUTPATIENT
Start: 2024-12-04 | End: 2024-12-04

## 2024-12-04 RX ORDER — LABETALOL HYDROCHLORIDE 5 MG/ML
5 INJECTION, SOLUTION INTRAVENOUS
Status: DISCONTINUED | OUTPATIENT
Start: 2024-12-04 | End: 2024-12-05 | Stop reason: HOSPADM

## 2024-12-04 RX ORDER — SODIUM CHLORIDE, SODIUM LACTATE, POTASSIUM CHLORIDE, CALCIUM CHLORIDE 600; 310; 30; 20 MG/100ML; MG/100ML; MG/100ML; MG/100ML
9 INJECTION, SOLUTION INTRAVENOUS CONTINUOUS
Status: DISCONTINUED | OUTPATIENT
Start: 2024-12-04 | End: 2024-12-05 | Stop reason: HOSPADM

## 2024-12-04 RX ORDER — FAMOTIDINE 10 MG/ML
20 INJECTION, SOLUTION INTRAVENOUS ONCE
Status: COMPLETED | OUTPATIENT
Start: 2024-12-04 | End: 2024-12-04

## 2024-12-04 RX ORDER — IPRATROPIUM BROMIDE AND ALBUTEROL SULFATE 2.5; .5 MG/3ML; MG/3ML
3 SOLUTION RESPIRATORY (INHALATION) ONCE AS NEEDED
Status: DISCONTINUED | OUTPATIENT
Start: 2024-12-04 | End: 2024-12-05 | Stop reason: HOSPADM

## 2024-12-04 RX ORDER — SODIUM CHLORIDE 0.9 % (FLUSH) 0.9 %
3-10 SYRINGE (ML) INJECTION AS NEEDED
Status: DISCONTINUED | OUTPATIENT
Start: 2024-12-04 | End: 2024-12-05 | Stop reason: HOSPADM

## 2024-12-04 RX ORDER — HYDROMORPHONE HYDROCHLORIDE 1 MG/ML
0.5 INJECTION, SOLUTION INTRAMUSCULAR; INTRAVENOUS; SUBCUTANEOUS
Status: DISCONTINUED | OUTPATIENT
Start: 2024-12-04 | End: 2024-12-05 | Stop reason: HOSPADM

## 2024-12-04 RX ORDER — FENTANYL CITRATE 50 UG/ML
50 INJECTION, SOLUTION INTRAMUSCULAR; INTRAVENOUS ONCE AS NEEDED
Status: DISCONTINUED | OUTPATIENT
Start: 2024-12-04 | End: 2024-12-05 | Stop reason: HOSPADM

## 2024-12-04 RX ORDER — PROMETHAZINE HYDROCHLORIDE 25 MG/1
25 SUPPOSITORY RECTAL ONCE AS NEEDED
Status: DISCONTINUED | OUTPATIENT
Start: 2024-12-04 | End: 2024-12-05 | Stop reason: HOSPADM

## 2024-12-04 RX ORDER — ACETAMINOPHEN 500 MG
1000 TABLET ORAL ONCE
Status: COMPLETED | OUTPATIENT
Start: 2024-12-04 | End: 2024-12-04

## 2024-12-04 RX ORDER — ISOSULFAN BLUE 50 MG/5ML
INJECTION, SOLUTION SUBCUTANEOUS AS NEEDED
Status: DISCONTINUED | OUTPATIENT
Start: 2024-12-04 | End: 2024-12-04 | Stop reason: HOSPADM

## 2024-12-04 RX ORDER — SUCCINYLCHOLINE/SOD CL,ISO/PF 200MG/10ML
SYRINGE (ML) INTRAVENOUS AS NEEDED
Status: DISCONTINUED | OUTPATIENT
Start: 2024-12-04 | End: 2024-12-04 | Stop reason: SURG

## 2024-12-04 RX ORDER — LIDOCAINE 40 MG/G
1 CREAM TOPICAL AS NEEDED
Status: DISCONTINUED | OUTPATIENT
Start: 2024-12-04 | End: 2024-12-05 | Stop reason: HOSPADM

## 2024-12-04 RX ORDER — GABAPENTIN 300 MG/1
300 CAPSULE ORAL ONCE
Status: COMPLETED | OUTPATIENT
Start: 2024-12-04 | End: 2024-12-04

## 2024-12-04 RX ORDER — ROCURONIUM BROMIDE 10 MG/ML
INJECTION, SOLUTION INTRAVENOUS AS NEEDED
Status: DISCONTINUED | OUTPATIENT
Start: 2024-12-04 | End: 2024-12-04 | Stop reason: SURG

## 2024-12-04 RX ORDER — ONDANSETRON 2 MG/ML
INJECTION INTRAMUSCULAR; INTRAVENOUS AS NEEDED
Status: DISCONTINUED | OUTPATIENT
Start: 2024-12-04 | End: 2024-12-04 | Stop reason: SURG

## 2024-12-04 RX ORDER — OXYCODONE AND ACETAMINOPHEN 7.5; 325 MG/1; MG/1
1 TABLET ORAL EVERY 4 HOURS PRN
Status: DISCONTINUED | OUTPATIENT
Start: 2024-12-04 | End: 2024-12-05 | Stop reason: HOSPADM

## 2024-12-04 RX ORDER — BUPIVACAINE HYDROCHLORIDE AND EPINEPHRINE 5; 5 MG/ML; UG/ML
INJECTION, SOLUTION EPIDURAL; INTRACAUDAL; PERINEURAL AS NEEDED
Status: DISCONTINUED | OUTPATIENT
Start: 2024-12-04 | End: 2024-12-04 | Stop reason: HOSPADM

## 2024-12-04 RX ORDER — MAGNESIUM HYDROXIDE 1200 MG/15ML
LIQUID ORAL AS NEEDED
Status: DISCONTINUED | OUTPATIENT
Start: 2024-12-04 | End: 2024-12-04 | Stop reason: HOSPADM

## 2024-12-04 RX ORDER — ALBUTEROL SULFATE 90 UG/1
INHALANT RESPIRATORY (INHALATION) AS NEEDED
Status: DISCONTINUED | OUTPATIENT
Start: 2024-12-04 | End: 2024-12-04 | Stop reason: SURG

## 2024-12-04 RX ORDER — HYDROCORTISONE SODIUM SUCCINATE 100 MG/2ML
100 INJECTION INTRAMUSCULAR; INTRAVENOUS ONCE
Status: COMPLETED | OUTPATIENT
Start: 2024-12-04 | End: 2024-12-04

## 2024-12-04 RX ORDER — MIDAZOLAM HYDROCHLORIDE 1 MG/ML
1 INJECTION, SOLUTION INTRAMUSCULAR; INTRAVENOUS
Status: DISCONTINUED | OUTPATIENT
Start: 2024-12-04 | End: 2024-12-05 | Stop reason: HOSPADM

## 2024-12-04 RX ADMIN — SODIUM CHLORIDE 2000 MG: 900 INJECTION INTRAVENOUS at 13:42

## 2024-12-04 RX ADMIN — DIAZEPAM 5 MG: 5 TABLET ORAL at 09:37

## 2024-12-04 RX ADMIN — FENTANYL CITRATE 25 MCG: 50 INJECTION, SOLUTION INTRAMUSCULAR; INTRAVENOUS at 13:56

## 2024-12-04 RX ADMIN — Medication 160 MG: at 13:56

## 2024-12-04 RX ADMIN — DEXAMETHASONE SODIUM PHOSPHATE 8 MG: 4 INJECTION, SOLUTION INTRA-ARTICULAR; INTRALESIONAL; INTRAMUSCULAR; INTRAVENOUS; SOFT TISSUE at 14:10

## 2024-12-04 RX ADMIN — ROCURONIUM BROMIDE 10 MG: 10 INJECTION, SOLUTION INTRAVENOUS at 15:24

## 2024-12-04 RX ADMIN — LIDOCAINE HYDROCHLORIDE 1 EACH: 40 SOLUTION TOPICAL at 13:56

## 2024-12-04 RX ADMIN — PROPOFOL 50 MG: 10 INJECTION, EMULSION INTRAVENOUS at 14:46

## 2024-12-04 RX ADMIN — HYDROCORTISONE SODIUM SUCCINATE 100 MG: 100 INJECTION, POWDER, FOR SOLUTION INTRAMUSCULAR; INTRAVENOUS at 10:20

## 2024-12-04 RX ADMIN — LIDOCAINE HYDROCHLORIDE 40 MG: 20 INJECTION, SOLUTION INFILTRATION; PERINEURAL at 13:56

## 2024-12-04 RX ADMIN — IPRATROPIUM BROMIDE AND ALBUTEROL SULFATE 3 ML: .5; 3 SOLUTION RESPIRATORY (INHALATION) at 09:45

## 2024-12-04 RX ADMIN — GABAPENTIN 300 MG: 300 CAPSULE ORAL at 21:03

## 2024-12-04 RX ADMIN — ROCURONIUM BROMIDE 20 MG: 10 INJECTION, SOLUTION INTRAVENOUS at 17:28

## 2024-12-04 RX ADMIN — MAGNESIUM SULFATE HEPTAHYDRATE 1 G: 500 INJECTION, SOLUTION INTRAMUSCULAR; INTRAVENOUS at 14:10

## 2024-12-04 RX ADMIN — ALBUTEROL SULFATE 4 PUFF: 90 AEROSOL, METERED RESPIRATORY (INHALATION) at 18:47

## 2024-12-04 RX ADMIN — ONDANSETRON 4 MG: 2 INJECTION INTRAMUSCULAR; INTRAVENOUS at 18:07

## 2024-12-04 RX ADMIN — LIDOCAINE 4% 1 APPLICATION: 4 CREAM TOPICAL at 09:59

## 2024-12-04 RX ADMIN — ROCURONIUM BROMIDE 30 MG: 10 INJECTION, SOLUTION INTRAVENOUS at 15:03

## 2024-12-04 RX ADMIN — HYDROMORPHONE HYDROCHLORIDE 0.5 MG: 0.5 INJECTION, SOLUTION INTRAMUSCULAR; INTRAVENOUS; SUBCUTANEOUS at 20:41

## 2024-12-04 RX ADMIN — TRAMADOL HYDROCHLORIDE 50 MG: 50 TABLET, COATED ORAL at 21:03

## 2024-12-04 RX ADMIN — HYDROMORPHONE HYDROCHLORIDE 0.5 MG: 0.5 INJECTION, SOLUTION INTRAMUSCULAR; INTRAVENOUS; SUBCUTANEOUS at 20:17

## 2024-12-04 RX ADMIN — KETOROLAC TROMETHAMINE 30 MG: 30 INJECTION, SOLUTION INTRAMUSCULAR; INTRAVENOUS at 18:42

## 2024-12-04 RX ADMIN — FENTANYL CITRATE 25 MCG: 50 INJECTION, SOLUTION INTRAMUSCULAR; INTRAVENOUS at 14:15

## 2024-12-04 RX ADMIN — SODIUM CHLORIDE, SODIUM LACTATE, POTASSIUM CHLORIDE, CALCIUM CHLORIDE 9 ML/HR: 20; 30; 600; 310 INJECTION, SOLUTION INTRAVENOUS at 10:03

## 2024-12-04 RX ADMIN — SODIUM CHLORIDE 2000 MG: 900 INJECTION INTRAVENOUS at 17:39

## 2024-12-04 RX ADMIN — PROPOFOL 200 MG: 10 INJECTION, EMULSION INTRAVENOUS at 13:56

## 2024-12-04 RX ADMIN — HYDROMORPHONE HYDROCHLORIDE 0.5 MG: 1 INJECTION, SOLUTION INTRAMUSCULAR; INTRAVENOUS; SUBCUTANEOUS at 14:42

## 2024-12-04 RX ADMIN — FAMOTIDINE 20 MG: 10 INJECTION INTRAVENOUS at 10:17

## 2024-12-04 RX ADMIN — SODIUM CHLORIDE, POTASSIUM CHLORIDE, SODIUM LACTATE AND CALCIUM CHLORIDE: 600; 310; 30; 20 INJECTION, SOLUTION INTRAVENOUS at 14:21

## 2024-12-04 RX ADMIN — ROCURONIUM BROMIDE 10 MG: 10 INJECTION, SOLUTION INTRAVENOUS at 13:56

## 2024-12-04 RX ADMIN — ACETAMINOPHEN 1000 MG: 10 INJECTION INTRAVENOUS at 10:25

## 2024-12-04 RX ADMIN — ROCURONIUM BROMIDE 20 MG: 10 INJECTION, SOLUTION INTRAVENOUS at 16:20

## 2024-12-04 RX ADMIN — ACETAMINOPHEN 1000 MG: 500 TABLET, FILM COATED ORAL at 21:02

## 2024-12-04 RX ADMIN — TILMANOCEPT 1 DOSE: KIT at 11:25

## 2024-12-04 RX ADMIN — ONDANSETRON 4 MG: 2 INJECTION, SOLUTION INTRAMUSCULAR; INTRAVENOUS at 20:42

## 2024-12-04 RX ADMIN — SUGAMMADEX 200 MG: 100 INJECTION, SOLUTION INTRAVENOUS at 19:21

## 2024-12-04 RX ADMIN — PROPOFOL 25 MCG/KG/MIN: 10 INJECTION, EMULSION INTRAVENOUS at 14:03

## 2024-12-04 NOTE — ANESTHESIA PROCEDURE NOTES
Airway  Urgency: elective    Date/Time: 12/4/2024 1:59 PM  Airway not difficult    General Information and Staff    Patient location during procedure: OR  Anesthesiologist: Marina Meade MD  CRNA/CAA: Clair Solomon CRNA    Indications and Patient Condition  Indications for airway management: airway protection    Preoxygenated: yes  MILS not maintained throughout  Mask difficulty assessment: 1 - vent by mask    Final Airway Details  Final airway type: endotracheal airway      Successful airway: ETT  Cuffed: yes   Successful intubation technique: video laryngoscopy  Facilitating devices/methods: intubating stylet  Endotracheal tube insertion site: oral  Blade: CMAC  Blade size: D  ETT size (mm): 7.0  Cormack-Lehane Classification: grade IIa - partial view of glottis  Placement verified by: chest auscultation and capnometry   Cuff volume (mL): 8  Measured from: lips  ETT/EBT  to lips (cm): 22  Number of attempts at approach: 1  Assessment: lips, teeth, and gum same as pre-op and atraumatic intubation

## 2024-12-04 NOTE — NURSING NOTE
Per Dr. Kaur, no need for patient to be in suicide precautions ( had attempt in 1996 - no problems since),

## 2024-12-04 NOTE — H&P
General Surgery Breast Cancer History and Physical Exam      Summary:    Magda Nath is a 63 y.o. lady who presents with a new diagnosis of right breast invasive lobular carcinoma: Grade II,  ER-/WA-, Her2-; [id number], Stage I.       A multidisciplinary plan has been formulated for the patient:    (1) Breast Surgical Oncology:  -Follow up [employer] 9 panel genetic testing. I will call her with results.   -MRI to evaluate anatomy as well as for surgical planning.   -Nurse navigator consult.   -Surgical plan: She believes she would like to proceed with a right breast Kathrine localized lumpectomy with SLN biopsy with oncoplastic reconstruction.   -Plastic surgery referral placed.     (2) Medical Oncology:  -Will refer postoperatively for evaluation for endocrine therapy and possible need for chemotherapy due to TNBC status. She would like to see Dr. Vallejo.      (3) Radiation Oncology:  -Will refer postoperatively for evaluation for radiation therapy.     Referring Provider: SVETA Bob     Chief Complaint: abnormal breast imaging     History of Present Illness: Ms. Magda Nath is a 63 y.o. year old lady, seen at the request of Francesca Adrian PA for a new diagnosis of right breast cancer.       This was initially detected as an imaging abnormality. She has had annual mammograms each year. She denies any prior history of abnormal mammograms or breast biopsies. Her work-up is detailed in the oncologic history below.      She denies any breast lumps, pain, skin changes, or nipple discharge. She has a family history of breast cancer in a sister (age 60's) and mother (age 65). She denies any family history of ovarian cancer.      Workup of Current Diagnosis:    9/11/2024 Bilateral Breast MRI   FINDINGS:   Amount of Fibroglandular Tissue: Scattered fibroglandular tissue. Background Parenchymal Enhancement: Mild.   RIGHT BREAST:   There is an irregular enhancing mass within the inferior, slightly lateral right  breast, posterior depth at the 7:00 position, 11 cm from the nipple measuring 1.0 x 1.0 x 0.8 cm (AP, transverse, craniocaudal dimensions respectively) (subtraction series, image 102). This demonstrates type III, washout kinetics. A probable correlate is seen on the same day screening mammogram, described is a focal asymmetry.   There is a questionable prominent low right axillary lymph node (subtraction series, image 45, series  image 42). No morphologically suspicious right internal mammary chain lymphadenopathy.  LEFT BREAST:   No suspicious finding is demonstrated within the left breast. No morphologically suspicious left axillary or left internal mammary chain lymphadenopathy.   VISUALIZED CHEST/ABDOMEN:   No suspicious finding is demonstrated within the visualized chest or abdomen.    IMPRESSION:   1. Suspicious right breast mass for which right diagnostic mammogram and ultrasound are recommended.   2.  Questionable prominent right axillary lymph node for which right diagnostic axillary ultrasound is recommended.   3.  No MRI evidence of left breast malignancy.   RECOMMENDATION:   Right diagnostic mammogram and ultrasound, right axillary ultrasound, and ultrasound-guided biopsy/biopsies as clinically indicated.  FINAL ASSESSMENT: BI-RADS Category 4, Suspicious abnormality.  Biopsy should be considered.      9/11/2024 Bilateral Screening Mammogram:  BREAST DENSITY: There are scattered areas of fibroglandular density.   RISK: Tyrer-Newbury risk score is 26%.   FINDINGS:   There is a focal asymmetry within the central, slightly lateral right breast, posterior depth. No suspicious findings are present in the left breast.   IMPRESSION: Right breast focal asymmetry.   RECOMMENDATION:   1.  Right Diagnostic Mammogram and possible ultrasound.   2.  LIFETIME RISK: Due to the patient's increased risk which is calculated based on the history provided, recommend referral to our [facility], annual screening  breast MRI, and annual screening mammography. If you would like to schedule an appointment with our [facility], please call [phone]). The patient had a same day screening breast MRI. Please see separate report for details.   FINAL ASSESSMENT: BI-RADS Category 0: Incomplete      9/13/2024 Right Breast Diagnostic Mammogram and US:   BREAST DENSITY: There are scattered areas of fibroglandular density.   RISK: Tyrer-Hitterdal risk score is 22%.  MAMMOGRAPHIC FINDINGS:   Previously questioned focal asymmetry persists on spot compression imaging and appears to localize to the lower outer right breast, posterior depth. There is a questionable asymmetry within the lateral subareolar region of the right breast.   SONOGRAPHIC FINDINGS:   Real time sonography was performed with permanent image documentation. Targeted sonographic imaging of the lower outer right breast, lateral subareolar region of the right breast, and right axilla was performed.   At the 7:00 position, 10 cm from the nipple there is an irregular hypoechoic mass measuring 1.1 x 1.1 x 0.8 cm. This corresponds to the suspicious MRI and mammographic finding in question.   At the 9:00 position, 3 cm from the nipple there are 2 adjacent predominantly anechoic masses measuring up to 0.3 cm each. This is felt to correspond to asymmetry seen mammographically. This is favored to represent focal fibrocystic type changes, however the margins are slightly microlobulated.   Targeted sonographic imaging of the right axilla demonstrates 1 prominent lymph node with the cortex measuring 0.5 cm. This is felt to correspond to the questionable prominent lymph nodes seen on recent breast MRI.   Benign-appearing cyst is noted at the 9:00 position, 1 cm from the nipple.   IMPRESSION:   1. Suspicious right breast mass 7:00 position, 10 cm from the nipple for which ultrasound-guided biopsy is recommended.   2.  Indeterminate right breast masses 9:00 position, 3 cm  from the nipple for which ultrasound-guided biopsy is recommended.   3.  Indeterminate right axillary lymph node for which ultrasound-guided biopsy is recommended.   RECOMMENDATION: Three site right breast/axilla ultrasound guided biopsies as detailed above.   Results and recommendations were discussed with the patient by Dr. Zarate at the end of the examination.   FINAL ASSESSMENT: BI-RADS Category 4, Suspicious abnormality.  Biopsy should be considered.      9/13/2024 Right Breast US Guided Biopsy:   Right breast 9:00 3 cm from the nipple: Benign breast tissue with apocrine metaplasia and adenosis, pathology is benign and concordant   Axilla, right lymph node: Lymph node, negative for metastatic carcinoma. Pathology is benign and may be concordant.   Right breast 7:00 10 cm from nipple: Invasive lobular carcinoma with focal  pleomorphic and upper in features. Pathology is malignant and concordant.   Recommendation is for surgical consultation for further management and treatment of right breast biopsy-proven invasive lobular carcinoma at 7:00, and appropriate staging of the right axilla. I phoned the patient with all biopsy results and recommendations on 09/18/2024 at 1:40 PM. Patient may wish to be seen through the cancer center, however she may also consider an outside referral closer to her house. Therefore, appropriate member of the breast care team will also called and notify the patient's referring provider (patient gave physician assistant Ashok ) in [facility] as an outside referral to a surgeon may be indicated. The patient is aware a surgical consultation is recommended and if not   scheduled through the [facility], she should follow-up with her referring provider for appropriate referral.      9/13/2024 Pathology:   A.  BREAST, RIGHT, 9:00 3CM FN, CORE NEEDLE BIOPSY:   - Benign breast tissue with apocrine metaplasia and adenosis.     B.  AXILLA, RIGHT LYMPH NODE, CORE NEEDLE BIOPSY:   -  Lymph node, negative for metastatic carcinoma.     C.  BREAST, RIGHT, 7:00, 10CMFN, CORE NEEDLE BIOPSY:   - INVASIVE LOBULAR CARCINOMA WITH FOCAL PLEOMORPHIC AND APOCRINE FEATURES.   Summary:   - Histologic grade:  II (tubule score 3, nuclear score 2, mitoses score 1)   - Size of largest focus: 8.5 mm   -  Lymphovascular invasion : Not identified.   - Ductal  carcinoma in  situ:  Not identified.   - Lobular  neoplasia i n situ: Not identified.   - Biomarkers:        - Estrogen receptor:     Negative (0%)        - Progesterone receptor:     Negative (0%)        - Her-2/vandana by IHC:     Negative (1+)        - Ki-67:     5%        - Androgen Receptor:     Positive      Gynecologic History:   . P:2 AB:0  Age at first childbirth: 22  Lactation/How long: yes x6 weeks  Age at menarche: 16  Age at menopause: hysterectomy at age 41  Total years of oral contraceptive use: 15 years previously  Total years of hormone replacement therapy: estradiol, stopped at diagnosis      Past Medical History:   HTN  DM   Osteoarthritis      Past Surgical History:    Colonoscopy   Hemorrhoidectomy   Pelvic floor repair   Hysterectomy   Rectocele repair   Left total hip arthroplasty   Urethra surgery      Family History:    As above     Social History:  Denies tobacco use, few cigarettes since diagnosis, on nicotine patch   Occasional alcohol use     Allergies:   Allergies         Allergies   Allergen Reactions    Erythromycin Rash       ALL MYCINS BREAK HER OUT    Nitrofurantoin Headache and Rash       CAUSES FEVER, HEADACHES    Codeine Headache       headaches         Medications:     Current Medications      Current Outpatient Medications:     albuterol (PROVENTIL) (2.5 MG/3ML) 0.083% nebulizer solution, Take 2.5 mg by nebulization 4 (Four) Times a Day., Disp: , Rfl:     albuterol sulfate HFA (Proventil HFA) 108 (90 Base) MCG/ACT inhaler, Inhale 2 puffs Every 4 (Four) Hours As Needed for Wheezing., Disp: 8.5 g, Rfl: 1    Ascorbic Acid  (Vitamin C) 500 MG capsule, Take 500 mg by mouth Daily., Disp: , Rfl:     aspirin 81 MG EC tablet, Take 1 tablet by mouth Daily., Disp: , Rfl:     atorvastatin (LIPITOR) 40 MG tablet, Take 1 tablet by mouth Daily., Disp: , Rfl:     B Complex Vitamins (vitamin b complex) capsule capsule, Take  by mouth Daily., Disp: , Rfl:     busPIRone (BUSPAR) 5 MG tablet, Take 1 tablet by mouth 3 (Three) Times a Day As Needed (anxiety)., Disp: , Rfl:     Cholecalciferol 25 MCG (1000 UT) tablet, Take 1 tablet by mouth Daily., Disp: , Rfl:     Continuous Blood Gluc Sensor (Dexcom G6 Sensor), , Disp: , Rfl:     Continuous Blood Gluc Transmit (Dexcom G6 Transmitter) misc, , Disp: , Rfl:     cyclobenzaprine (FLEXERIL) 10 MG tablet, Take 1 tablet by mouth At Night As Needed for Muscle Spasms., Disp: , Rfl:     estradiol (ESTRACE) 2 MG tablet, Take 1 tablet by mouth Daily., Disp: , Rfl:     ferrous sulfate 325 (65 FE) MG tablet, Take 1 tablet by mouth 2 (Two) Times a Day With Meals., Disp: , Rfl:     furosemide (LASIX) 20 MG tablet, Take 1 tablet by mouth Daily As Needed (swelling)., Disp: , Rfl:     Hydrocortisone, Perianal, (Anusol-HC) 2.5 % rectal cream, Apply rectally 3 times daily.  Include applicator., Disp: 30 g, Rfl: 1    Insulin Glargine (BASAGLAR KWIKPEN SC), Inject 60 Units under the skin into the appropriate area as directed Daily., Disp: , Rfl:     ipratropium-albuterol (DUO-NEB) 0.5-2.5 mg/3 ml nebulizer, Take 3 mL by nebulization Every 4 (Four) Hours As Needed for Wheezing., Disp: 90 mL, Rfl: 1    levocetirizine (XYZAL) 5 MG tablet, Take 1 tablet by mouth Every Evening., Disp: , Rfl:     lisinopril (PRINIVIL,ZESTRIL) 10 MG tablet, Take 1 tablet by mouth Daily., Disp: , Rfl:     meloxicam (MOBIC) 15 MG tablet, Take 1 tablet by mouth Daily., Disp: , Rfl:     nicotine (NICODERM CQ) 21 MG/24HR patch, Place 1 patch on the skin as directed by provider Daily., Disp: 30 patch, Rfl: 0    Symbicort 160-4.5 MCG/ACT inhaler, Inhale 2  puffs 2 (Two) Times a Day., Disp: , Rfl:     zolpidem (Ambien) 10 MG tablet, Take 1 tablet by mouth At Night As Needed for Sleep., Disp: , Rfl:         Laboratory Values:    Labs from 8/15/2024 reviewed by me      Review of Systems:   Influenza-like illness: no fever, no  cough, no  sore throat, no  body aches, no loss of sense of taste or smell, no known exposure to person with Covid-19.  Constitutional: Negative for fevers or chills  HENT: Negative for hearing loss or runny nose  Eyes: Negative for vision changes or scleral icterus  Respiratory: Negative for cough or shortness of breath  Cardiovascular: Negative for chest pain or heart palpitations  Gastrointestinal: Negative for abdominal pain, nausea, vomiting, constipation, melena, or hematochezia  Genitourinary: Negative for hematuria or dysuria  Musculoskeletal: Negative for joint swelling or gait instability  Neurologic: Negative for tremors or seizures  Psychiatric: Negative for suicidal ideations or depression  All other systems reviewed and negative     Physical Exam:   ECO - Asymptomatic  Constitutional: Well-developed well-nourished, no acute distress  Eyes: Conjunctiva normal, sclera nonicteric  ENMT: Hearing grossly normal, oral mucosa moist  Neck: Supple, no palpable mass, trachea midline  Respiratory: Clear to auscultation, normal inspiratory effort  Cardiovascular: Regular rate, no peripheral edema, no jugular venous distention  Breast: symmetric  Right: No visible abnormalities on inspection while seated, with arms raised or hands on hips. No masses, skin changes, or nipple abnormalities.  Left: No visible abnormalities on inspection while seated, with arms raised or hands on hips. No masses, skin changes, or nipple abnormalities.  Biopsy site appreciated in the right breast, otherwise no skin changes.   No clinical chest wall involvement.  Gastrointestinal: Soft, nontender  Lymphatics (palpable nodes): No cervical, supraclavicular or axillary  lymphadenopathy  Skin:  Warm, dry, no rash on visualized skin surfaces  Musculoskeletal: Symmetric strength, normal gait  Psychiatric: Alert and oriented ×3, normal affect       Discussion:  I had an extensive discussion with the patient and her family about the nature of her breast cancer diagnosis. We reviewed the components of breast tissue including ducts and lobules. We reviewed her pathology report in detail. We reviewed breast cancer histology, including stage, grade, ER/NM receptors, HER2 receptors and how this applies to her diagnosis. We reviewed the basics of systemic and local/regional management of breast cancer.      We discussed that most breast cancer is not hereditary, however given her family history, this may play a role in her case. I believe genetic testing is warranted and could affect surgical decision making.      We reviewed potential surgical treatments to include partial mastectomy, mastectomy, sentinel lymph node biopsy and axillary node dissection and discussed the rationale associated with each approach. Regarding radiation therapy, we discussed that radiation is indicated in all cases of breast conservation and in only limited circumstances following mastectomy. We discussed that the primary goal of adjuvant radiation is to decrease the likelihood of local recurrence.      In her case, she is a good candidate for lumpectomy and she would like to proceed with breast conservation. We discussed that lumpectomy would require preoperative wire-localization. We also discussed the risk of positive margins and that she must have negative margins for lumpectomy to be an appropriate oncologic procedure. I will make every effort to obtain negative margins at her initial operation, but there is a 10-15% chance that she will require a second operation for re-excision, or possibly a total mastectomy. We will not know the margin status until after her final pathology has returned.      We discussed  axillary staging. I described the procedure for sentinel lymph node biopsy in detail, including the preoperative injection of technetium sulfur colloid and intraoperative injection of lymphazurin blue dye. I explained that this is a mapping test and not a cancer test, that all of the lymph nodes containing these dyes will be removed for complete testing by pathology, and that the results could impact the decision for adjuvant treatment or additional surgery.     I described additional risks and potential complications associated with surgery, including, but not limited to, bleeding, infection, complications related to blue dye, lymphedema, deformity/poor cosmetic result, chronic pain, neurovascular injury, numbness, seroma, hematoma, deep venous thrombosis, skin flap necrosis, disease recurrence and the possibility of requiring additional surgery. We also discussed other treatment options including the option of not undergoing any surgical treatment and the risks associated with this including disease progression. She expressed an understanding of these factors and wished to proceed.     We discussed that in her case, systemic treatment would involve endocrine therapy and possibly chemotherapy. She will be referred to medical oncology postoperatively to discuss this further.      KOBI MOORE M.D.  General and Endoscopic Surgery  [facility]     [address] [place] [address], [address]  [place], [place], [address]  P: [phone]  F: [phone]

## 2024-12-04 NOTE — ANESTHESIA PREPROCEDURE EVALUATION
Anesthesia Evaluation     no history of anesthetic complications:                Airway   Mallampati: III  TM distance: <3 FB  Neck ROM: full  Possible difficult intubation  Dental - normal exam     Pulmonary    (+) a smoker Former, COPD severe, asthma,home oxygen, shortness of breath, sleep apnea on CPAP  Cardiovascular     (+) hypertension, hyperlipidemia  (-) dysrhythmias, angina      Neuro/Psych  (-) dizziness/light headedness, syncope  GI/Hepatic/Renal/Endo    (+) GERD, diabetes mellitus  (-) liver disease, no renal disease    Musculoskeletal     Abdominal    Substance History      OB/GYN          Other                    Anesthesia Plan    ASA 3     general     intravenous induction     Anesthetic plan, risks, benefits, and alternatives have been provided, discussed and informed consent has been obtained with: patient.    CODE STATUS:

## 2024-12-04 NOTE — H&P
Plastic Surgery History and Physical Note    Current Date: 2024  Name: Magda Nath  Age: 64 y.o.  Sex: female  MRN: [id number]  YOB: 1960    ?  Chief Complaint: Breast cancer  ?  History of Present Illness:  Magda Nath is a female 64 y.o. with right breast cancer, here to undergo right lumpectomy by Dr. Kaur followed by oncoplastic reconstruction by myself.  We will also perform a left/contralateral reduction for symmetry.      Family History:   Family History   Problem Relation Age of Onset    Diabetes Mother     Cancer Mother         Bladder cancer Breast Cancer mastectomy    Breast cancer Mother     Asthma Mother     Cancer Father     Diabetes Father         [facility]  [facility] later    Cancer Sister         breast, cancer double mastectomy    Cancer Paternal Aunt     Cancer Paternal Grandmother     Malig Hyperthermia Neg Hx      Social History:   Social History     Socioeconomic History    Marital status:    Tobacco Use    Smoking status: Former     Current packs/day: 0.00     Average packs/day: 1 pack/day for 49.7 years (49.7 ttl pk-yrs)     Types: Cigarettes     Start date: 1974     Quit date: 10/1/2023     Years since quittin.1     Passive exposure: Past    Smokeless tobacco: Never    Tobacco comments:     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   Vaping Use    Vaping status: Never Used   Substance and Sexual Activity    Alcohol use: Never    Drug use: Never    Sexual activity: Not Currently     Partners: Male     Birth control/protection: Post-menopausal, Hysterectomy     Past Medical History:   Past Medical History:   Diagnosis Date    Arthritis     Asthma 10/01    Rsv    Breast cancer, right     Chronic hypoxic respiratory failure     ,  COVID AND RSV - FOLLOWED BY DR SAVAGE    COPD (chronic obstructive pulmonary disease)     Diabetes mellitus     Fissure, anal     GERD (gastroesophageal reflux disease)      Hemorrhoid     History of anemia     History of COVID-19     COVID PNEUMONIA  9/2023, 10/2024    Hyperlipidemia     Hypertension     On home O2     2L NC    Psoriasis     Pulmonary nodule     4 MM LEFT LOWER LOBE NONCALCIFIED PER CT REPORT    RSV infection 10/01/2023    ADMITTED TO [facility]    Sleep apnea     Urinary and fecal incontinence      Past Surgical History:   Past Surgical History:   Procedure Laterality Date    COLONOSCOPY N/A     HEMORRHOIDECTOMY  2018    HYSTERECTOMY N/A     OTHER SURGICAL HISTORY      LABIAL SURGERY TO REPLACE URETHRA D/T COMPLICATIOSN FROM PREVIOUS SURGERIES AND MESH    PELVIC FLOOR REPAIR N/A     RECTOCELE REPAIR N/A     TOTAL HIP ARTHROPLASTY Left 04/2023    URETHRA SURGERY      X4     Medications:   Current Facility-Administered Medications:     ceFAZolin 2000 mg IVPB in 100 mL NS (MBP), 2,000 mg, Intravenous, Once, Shi Kaur MD    fentaNYL citrate (PF) (SUBLIMAZE) injection 50 mcg, 50 mcg, Intravenous, Once PRN, Marina Meade MD    lactated ringers infusion, 9 mL/hr, Intravenous, Continuous, Marina Meade MD, Last Rate: 9 mL/hr at 12/04/24 1003, 9 mL/hr at 12/04/24 1003    lidocaine (LMX) 4 % cream 1 Application, 1 Application, Topical, PRN, Shi Kaur MD, 1 Application at 12/04/24 0959    lidocaine (XYLOCAINE) 1 % injection 0.5 mL, 0.5 mL, Intradermal, Once PRN, Marina Meade MD    midazolam (VERSED) injection 1 mg, 1 mg, Intravenous, Q5 Min PRN, Marina Meade MD    sodium chloride 0.9 % flush 3 mL, 3 mL, Intravenous, Q12H, Marina Meade MD    sodium chloride 0.9 % flush 3-10 mL, 3-10 mL, Intravenous, PRN, Marina Meade MD  Allergies:   Allergies   Allergen Reactions    Erythromycin Rash     ALL MYCINS BREAK HER OUT    Nitrofurantoin Headache and Rash     CAUSES FEVER, HEADACHES    Codeine Headache     headaches     ?  Review of Systems:   Constitutional: Negative for fevers/chills  H&N: Negative for sore throat/rhinorrhea.  Eyes: Negative  for dry eye/visual disturbances.  Respiratory: Negative for cough or dyspnea.    Cardiovascular: Negative for cyanosis or chest pain.   Gastrointestinal: Negative for nausea/emesis.  Genitourinary: Negative for frequency/dysuria.   Musculoskeletal: Negative for muscle or joint pain.  Skin: Negative for new rash/lesions.  Endocrine: Negative for excessive thirst/feeling cold.  Hematology: Negative for easy bruising/bleeding.  Neurological: Negative for headaches/tinnitus.    Physical Examination:   /86 (BP Location: Left arm, Patient Position: Lying)   Pulse 74   Temp 98.6 °F (37 °C) (Oral)   Resp 20   SpO2 92%   There is no height or weight on file to calculate BMI.     General: Well nourished, well developed, in no acute distress  Eyes: PERRLA, EOM intact, sclerae anicteric, no conjunctival injection  HENT: Normocephalic, atraumatic, mucous membranes moist  Neck: Supple, no thyromegaly, no lymphadenopathy, trachea midline  Respiratory: Negative for wheezing or stridor, non-labored respirations   Cardiovascular: Normal rate, negative for cyannosis, negative for peripheral edema  Abdominal: Soft, non-distended.  Musculoskeletal: Warm and dry extremities. Negative for motor deficits. Able to stand from seated position easily.  Psychiatric: Appropriate affect, cooperative demeanor.  Neurologic: Oriented x 3, negative for gross motor or sensory deficits.  Skin: No rashes or open wounds.  Breasts: Grade 3 ptosis bilaterally, no open rashes      Labs:  Lab Results   Component Value Date    WBC 11.82 (H) 11/25/2024    HGB 15.4 11/25/2024    HCT 49.2 (H) 11/25/2024    MCV 88.8 11/25/2024     11/25/2024     Lab Results   Component Value Date    GLUCOSE 154 (H) 11/25/2024    BUN 11 11/25/2024    CREATININE 0.64 11/25/2024    BCR 17.2 11/25/2024    K 4.2 11/25/2024    CO2 29.0 11/25/2024    CALCIUM 9.2 11/25/2024    ALBUMIN 3.7 10/01/2023    AST 12 10/01/2023    ALT 12 10/01/2023     No results found for:  "\"INR\", \"PROTIME\"  No results found for: \"PTT\"  Lab Results   Component Value Date    HGBA1C 7.50 (H) 10/02/2023     No results found for: \"ABORH\"      Recent Imaging:   No radiology results for the last day       Assessment and Plan:   Magda Nath is a 64 y.o. female with right breast cancer.  Here today for right breast lumpectomy and reconstruction and contralateral left reduction.   -ERAS preop  -Patient was seen and examined in pre-op holding.  -No changes in patient condition.  States she has been nicotine free since her preop visit with me  -Procedure details, risks, and benefits were discussed with the patient and informed consent discussion was performed.  -Questions answered to the patient's satisfaction.      Flaquito Yoder MD  12/04/24  13:36 EST           "

## 2024-12-05 NOTE — ANESTHESIA POSTPROCEDURE EVALUATION
Patient: Magda Nath    Procedure Summary       Date: 12/04/24 Room / Location: [facility] OR [address] [place] [address] MAIN OR    Anesthesia Start: 1348 Anesthesia Stop: 1950    Procedures:       right breast Kathrine guided lumpectomy with sentinel lymph node biopsy (Right: Breast)      RIGHT - ONCOPLASTIC RECONSTRUCTION LEFT - BREAST REDUCTION FOR SYMMETRY (Bilateral: Chest) Diagnosis:       Malignant neoplasm of female breast, unspecified estrogen receptor status, unspecified laterality, unspecified site of breast      (Malignant neoplasm of female breast, unspecified estrogen receptor status, unspecified laterality, unspecified site of breast [C50.919])    Surgeons: Shi Kaur MD; Flaquito Yoder MD Provider: Pastor Monroy MD    Anesthesia Type: general ASA Status: 3            Anesthesia Type: general    Vitals  Vitals Value Taken Time   /66 12/04/24 2100   Temp 36.9 °C (98.5 °F) 12/04/24 2100   Pulse 81 12/04/24 2100   Resp 20 12/04/24 2100   SpO2 92 % 12/04/24 2100           Anesthesia Post Evaluation

## 2024-12-06 LAB
CYTO UR: NORMAL
CYTO UR: NORMAL
LAB AP CASE REPORT: NORMAL
LAB AP CASE REPORT: NORMAL
LAB AP DIAGNOSIS COMMENT: NORMAL
LAB AP DIAGNOSIS COMMENT: NORMAL
LAB AP SPECIAL STAINS: NORMAL
LAB AP SPECIAL STAINS: NORMAL
LAB AP SYNOPTIC CHECKLIST: NORMAL
LAB AP SYNOPTIC CHECKLIST: NORMAL
PATH REPORT.FINAL DX SPEC: NORMAL
PATH REPORT.FINAL DX SPEC: NORMAL
PATH REPORT.GROSS SPEC: NORMAL
PATH REPORT.GROSS SPEC: NORMAL

## 2024-12-06 NOTE — OP NOTE
Operative Report    Magda Nath  12/4/2024    Pre-op Diagnosis:   Malignant neoplasm of female breast, unspecified estrogen receptor status, unspecified laterality, unspecified site of breast [C50.919]       Post-Op Diagnosis Codes:     * Malignant neoplasm of female breast, unspecified estrogen receptor status, unspecified laterality, unspecified site of breast [C50.919]      Procedure(s):  RIGHT - ONCOPLASTIC RECONSTRUCTION/ Reduction  LEFT - BREAST REDUCTION FOR SYMMETRY    Surgeon: Flaquito Yoder MD       Assistant: Myesha Agudelo APRN  was responsible for performing the following activities: Retraction, Suction, Irrigation, Suturing, Closing, and Placing Dressing and their skilled assistance was necessary for the success of this case.    Anesthesia: General And local with Exparel mixture (20 cc Exparel, 20cc 0.25% Marcaine plain, 20 cc normal saline) all 60 cc instilled during the case    Indications for procedure:  Magda Nath is a 64 y.o. female with right breast cancer.  She was sent to me by Dr. Kaur who planned to do a right lumpectomy.  We discussed that a right oncoplastic reduction would be the best option for reconstruction for the patient, along with a contralateral left reduction for symmetry.  The risks benefits and alternatives of the procedure were discussed in detail with the patient at her preoperative visit and she understood and elected to proceed.  She was found to have a nicotine patch on at her preoperative visit which she was instructed to remove.  She states that she has not replaced it since her preoperative visit however she does know that this puts her at risk for poor wound healing.     Procedure narrative:  The patient was marked in the upright position in the holding area with sternal notch midline breast meridians and a Michael pattern for reduction.       She was taken to the operating room and Dr. Kaur performed the right lumpectomy and right axillary sentinel  lymph node biopsy.  While this was being performed I started on the contralateral left side and performed to the reduction using an 8 cm inferior pedicle.  The NAC was marked with a 42 mm cookie cutter the inferior pedicle was de-epithelialized with a #10 blade scalpel.  I then developed the pedicle and the skin flaps with Bovie electrocautery.  Hemostasis was obtained throughout.  I then performed irrigation with dilute Betadine and performed injection of local anesthetic with 30 cc of the Exparel mixture.  I then tailor tacked the left breast closed at this point Dr. Kaur had finished her resection on the right side.       I then started the oncoplastic reduction on the right side and performed a similar procedure, however due to the location of her tumor the inferior pedicle was not an option and I proceeded with a superior medial pedicle on the right side.  Once the pedicle, skin flaps had been created and the additional tissue had been resected and handed off for pathology I then performed irrigation, hemostasis and injection of Exparel on the right side.      Patient was then set up to assess size and symmetry which was deemed to be adequate.  I then placed the nipples at the most projecting portion of the breast which was found to be 6 cm above the inframammary fold, was placed with a 42 mm cookie cutter.      All incisions were then closed in layers with 3-0 Monocryl and strata fix running subcuticular 3-0 Monocryl along the inframammary fold.  The nipple was inset with 3-0 Monocryl deep dermals and 4-0 Monocryl running subcuticular.     Both nipples were pink and viable at the conclusion of the case she did have some bluish tent to the right nipple due to the methylene blue use during Dr. Kaur's portion of the procedure.    The chest was then cleansed and dried and dressings were applied consisting of Exofin to the inframammary and vertical incisions and Dermabond/Steri-Strips around the NAC.  Kerlix  fluffs were applied over the breast and a rolled Kerlix in each axilla followed by an Ace wrap to the chest.     The patient tolerated the procedure well and there were no complications noted she was taken to PACU in stable condition for planned discharge home.    Estimated Blood Loss: 200ml    Urine Voided: 300 mL    Specimens:                Breast specimen weights:  Left: 771 g  Right: 10.8g (lumpectomy) + 784g= 794.8g    Specimens       ID Source Type Tests Collected By Collected At Frozen?    A Breast, Right Tissue TISSUE PATHOLOGY EXAM   Shi Kaur MD 12/4/24 1421 No    Description: right breast lumpectomy short stitch superior long lateral double anterior    B Breast, Right Tissue TISSUE PATHOLOGY EXAM   Shi Kaur MD 12/4/24 1422 No    Description: right breast superior margin stitch at true margin    C Breast, Right Tissue TISSUE PATHOLOGY EXAM   Shi Kaur MD 12/4/24 1422 No    Description: right breast medial margin stitch at true margin    D Breast, Right Tissue TISSUE PATHOLOGY EXAM   Shi Kaur MD 12/4/24 1423 No    Description: right breast laterall margin stitch at true margin    E Breast, Right Tissue TISSUE PATHOLOGY EXAM   Shi Kaur MD 12/4/24 1423 No    Description: right breast inferior margin stitch at true margin    F Breast, Right Tissue TISSUE PATHOLOGY EXAM   Shi Kaur MD 12/4/24 1423 No    Description: right breast posterior margin stitch at true margin    G Breast, Right Tissue TISSUE PATHOLOGY EXAM   Shi Kaur MD 12/4/24 1423 No    Description: right breast anterior margin stitch at true margin    H [place] Lymph Node Tissue TISSUE PATHOLOGY EXAM   Shi Kaur MD 12/4/24 1438 No    Description: right brest sentinel lymph node  1 blue    I [place] Lymph Node Tissue TISSUE PATHOLOGY EXAM   Shi Kaur MD 12/4/24 1441 No    Description: right brest sentinel lymph node  2 hot and blue 500    J [place] Lymph Node  Tissue TISSUE PATHOLOGY EXAM   Shi Kaur MD 12/4/24 1444 No    Description: right brest sentinel lymph node  3 hot and blue 150    K Breast, Left Tissue TISSUE PATHOLOGY EXAM   Flaquito Yoder MD 12/4/24 1618 No    Description: left breast reduction for symmetry 771 grams    L Breast, Right Tissue TISSUE PATHOLOGY EXAM   Flaquito Yoder MD 12/4/24 1810 No    Description: RIGHT BREAST TISSUE 784 GRAMS            Drains: None    Findings: Good symmetry at the conclusion of the case, bilateral nipples pink and viable    Complications: None    Flaquito Yoder MD     Date: 12/6/2024  Time: 15:35 EST

## 2024-12-13 ENCOUNTER — TELEPHONE (OUTPATIENT)
Dept: SURGERY | Facility: CLINIC | Age: 64
End: 2024-12-13
Payer: COMMERCIAL

## 2024-12-13 NOTE — TELEPHONE ENCOUNTER
Pt called saying that her insurance company has been faxing us a medical information release request form. I asked my colleagues and they said we have not received it. Pt said that she will email us the forms. Pt needs these forms filled out from her surgery with Dr. Kaur on 12/4/24.

## 2024-12-13 NOTE — TELEPHONE ENCOUNTER
Called and spoke with pt, told her that I received the email she sent. I printed it out and answered the questions [facility] was asking. I informed pt that I sent what I could since the notes are not signed yet. I faxed the paper to [facility] and it was successful. Pt voiced understanding.

## 2024-12-17 ENCOUNTER — TELEPHONE (OUTPATIENT)
Dept: SURGERY | Facility: CLINIC | Age: 64
End: 2024-12-17
Payer: COMMERCIAL

## 2024-12-17 DIAGNOSIS — C50.919 MALIGNANT NEOPLASM OF FEMALE BREAST, UNSPECIFIED ESTROGEN RECEPTOR STATUS, UNSPECIFIED LATERALITY, UNSPECIFIED SITE OF BREAST: Primary | ICD-10-CM

## 2024-12-17 NOTE — TELEPHONE ENCOUNTER
Called and spoke with pt. Relayed the results Dr. Kaur sent. Pt already has a follow up appointment scheduled on 12/30/24 with Dr. Kaur. Pt said she is seeing the oncologist tomorrow. Pt voiced understanding.

## 2024-12-17 NOTE — TELEPHONE ENCOUNTER
----- Message from Shi Kaur sent at 12/17/2024  8:03 AM EST -----  Regarding: pathology results  Can you let her know that her pathology is back from surgery?  The breast cancer was completely removed from the right breast.  The margins were negative and the lymph nodes were all negative for cancer.  This came back as an early stage I breast cancer just like we thought.  I am putting in referrals to see oncology and radiation.  Can you make sure she has a 2-week follow-up with me for when I get back?    Thanks,  Shi    Final Diagnosis  1.  Breast, right, lumpectomy: (10.8 g)  A.  Invasive lobular carcinoma, [place] histologic grade 2 (tubules = 3, nuclei = 2, mitoses = 1), measuring 13 mm maximally, extending to the inked posterior margin  (superseded by specimen #6), identified associated with coil clip and biopsy site change; see synoptic template for tumor details.     2.  Breast, right superior margin, inked and oriented excision:               A.  Benign breast parenchyma; margin free of atypia, in situ and invasive malignancy.     3.  Breast, right medial margin, inked and oriented excision:               A.  Benign breast parenchyma; margin free of atypia, in situ and invasive malignancy.     4.  Breast, right lateral margin, inked and oriented excision:               A.  Benign breast parenchyma; margin free of atypia, in situ and invasive malignancy.     5.  Breast, right inferior margin, inked and oriented excision:               A.  Benign breast parenchyma; margin free of atypia, in situ and invasive malignancy.     6.  Breast, right posterior margin, inked and oriented excision:               A.  Benign breast parenchyma and skeletal muscle; margin free of atypia, in situ and invasive malignancy.     7.  Breast, right anterior margin, inked and oriented excision:               A.  Breast parenchyma with focal atypical lobular hyperplasia; margin free of atypia, in situ and invasive  malignancy.     8.  Lymph node, right sentinel #1, excision: (H&E, AE1/3)               A.  1 benign lymph node (0/1).               B.   Coil clip and biopsy site change identified.     9.  Lymph node, right sentinel #2, excision: (H&E, AE1/3)               A.  1 benign lymph node (0/1).     10.  Lymph node, right sentinel #3, excision: (H&E, AE1/3)               A.  1 benign lymph node (0/1).     11. Breast, left, reduction mammoplasty (771 g)               A.  Benign skin, subcutaneous tissue and breast parenchyma.     12.  Breast, right, reduction mammoplasty (784 g)               A.  Benign skin, subcutaneous tissue and breast parenchyma.

## 2024-12-18 ENCOUNTER — LAB (OUTPATIENT)
Dept: LAB | Facility: HOSPITAL | Age: 64
End: 2024-12-18
Payer: COMMERCIAL

## 2024-12-18 ENCOUNTER — CONSULT (OUTPATIENT)
Dept: ONCOLOGY | Facility: CLINIC | Age: 64
End: 2024-12-18
Payer: COMMERCIAL

## 2024-12-18 VITALS
BODY MASS INDEX: 32.63 KG/M2 | WEIGHT: 227.9 LBS | RESPIRATION RATE: 16 BRPM | SYSTOLIC BLOOD PRESSURE: 136 MMHG | OXYGEN SATURATION: 93 % | HEIGHT: 70 IN | TEMPERATURE: 96.9 F | DIASTOLIC BLOOD PRESSURE: 78 MMHG | HEART RATE: 76 BPM

## 2024-12-18 DIAGNOSIS — Z45.2 ENCOUNTER FOR CENTRAL LINE PLACEMENT: ICD-10-CM

## 2024-12-18 DIAGNOSIS — C50.919 MALIGNANT NEOPLASM OF FEMALE BREAST, UNSPECIFIED ESTROGEN RECEPTOR STATUS, UNSPECIFIED LATERALITY, UNSPECIFIED SITE OF BREAST: ICD-10-CM

## 2024-12-18 DIAGNOSIS — C50.919 MALIGNANT NEOPLASM OF FEMALE BREAST, UNSPECIFIED ESTROGEN RECEPTOR STATUS, UNSPECIFIED LATERALITY, UNSPECIFIED SITE OF BREAST: Primary | ICD-10-CM

## 2024-12-18 LAB
ALBUMIN SERPL-MCNC: 3.9 G/DL (ref 3.5–5.2)
ALBUMIN/GLOB SERPL: 1.6 G/DL
ALP SERPL-CCNC: 125 U/L (ref 39–117)
ALT SERPL W P-5'-P-CCNC: 17 U/L (ref 1–33)
ANION GAP SERPL CALCULATED.3IONS-SCNC: 7.5 MMOL/L (ref 5–15)
AST SERPL-CCNC: 16 U/L (ref 1–32)
BASOPHILS # BLD AUTO: 0.04 10*3/MM3 (ref 0–0.2)
BASOPHILS NFR BLD AUTO: 0.3 % (ref 0–1.5)
BILIRUB SERPL-MCNC: 0.3 MG/DL (ref 0–1.2)
BUN SERPL-MCNC: 13 MG/DL (ref 8–23)
BUN/CREAT SERPL: 22.8 (ref 7–25)
CALCIUM SPEC-SCNC: 9.3 MG/DL (ref 8.6–10.5)
CHLORIDE SERPL-SCNC: 100 MMOL/L (ref 98–107)
CO2 SERPL-SCNC: 32.5 MMOL/L (ref 22–29)
CREAT SERPL-MCNC: 0.57 MG/DL (ref 0.57–1)
DEPRECATED RDW RBC AUTO: 47.9 FL (ref 37–54)
EGFRCR SERPLBLD CKD-EPI 2021: 102.3 ML/MIN/1.73
EOSINOPHIL # BLD AUTO: 1.14 10*3/MM3 (ref 0–0.4)
EOSINOPHIL NFR BLD AUTO: 9.2 % (ref 0.3–6.2)
ERYTHROCYTE [DISTWIDTH] IN BLOOD BY AUTOMATED COUNT: 14.1 % (ref 12.3–15.4)
GLOBULIN UR ELPH-MCNC: 2.4 GM/DL
GLUCOSE SERPL-MCNC: 210 MG/DL (ref 65–99)
HCT VFR BLD AUTO: 49.2 % (ref 34–46.6)
HGB BLD-MCNC: 15 G/DL (ref 12–15.9)
IMM GRANULOCYTES # BLD AUTO: 0.03 10*3/MM3 (ref 0–0.05)
IMM GRANULOCYTES NFR BLD AUTO: 0.2 % (ref 0–0.5)
LYMPHOCYTES # BLD AUTO: 3.22 10*3/MM3 (ref 0.7–3.1)
LYMPHOCYTES NFR BLD AUTO: 26.1 % (ref 19.6–45.3)
MCH RBC QN AUTO: 27.9 PG (ref 26.6–33)
MCHC RBC AUTO-ENTMCNC: 30.5 G/DL (ref 31.5–35.7)
MCV RBC AUTO: 91.6 FL (ref 79–97)
MONOCYTES # BLD AUTO: 0.69 10*3/MM3 (ref 0.1–0.9)
MONOCYTES NFR BLD AUTO: 5.6 % (ref 5–12)
NEUTROPHILS NFR BLD AUTO: 58.6 % (ref 42.7–76)
NEUTROPHILS NFR BLD AUTO: 7.21 10*3/MM3 (ref 1.7–7)
PLATELET # BLD AUTO: 344 10*3/MM3 (ref 140–450)
PMV BLD AUTO: 9.8 FL (ref 6–12)
POTASSIUM SERPL-SCNC: 4.2 MMOL/L (ref 3.5–5.2)
PROT SERPL-MCNC: 6.3 G/DL (ref 6–8.5)
RBC # BLD AUTO: 5.37 10*6/MM3 (ref 3.77–5.28)
SODIUM SERPL-SCNC: 140 MMOL/L (ref 136–145)
WBC NRBC COR # BLD AUTO: 12.33 10*3/MM3 (ref 3.4–10.8)

## 2024-12-18 PROCEDURE — 80053 COMPREHEN METABOLIC PANEL: CPT | Performed by: INTERNAL MEDICINE

## 2024-12-18 PROCEDURE — 85025 COMPLETE CBC W/AUTO DIFF WBC: CPT | Performed by: INTERNAL MEDICINE

## 2024-12-18 PROCEDURE — 36415 COLL VENOUS BLD VENIPUNCTURE: CPT

## 2024-12-18 NOTE — PROGRESS NOTES
Subjective     REASON FOR CONSULTATION:  breast cancer  Provide an opinion on any further workup or treatment                             REQUESTING PHYSICIAN:  Vincent    RECORDS OBTAINED:  Records of the patients history including those obtained from the referring provider were reviewed and summarized in detail.    HISTORY OF PRESENT ILLNESS:  The patient is a 64 y.o. year old female who is here for an opinion about the above issue.    History of Present Illness   This is a 64-year-old woman with COPD/chronic hypoxic respiratory failure on O2 2 L by nasal cannula, hyperlipidemia, hypertension, type 2 diabetes referred for adjuvant recommendations of breast cancer.  The patient had abnormal screening mammography on 9/11/2024 showing scattered fibroglandular densities and a focal asymmetry in the lateral right breast.  A follow-up diagnostic mammogram and ultrasound were performed 9/13/2024 showing at the 7 o'clock position of the right breast mass 1.1 x 1.1 x 0.8 cm in size.  An ultrasound biopsy was performed on 9/13/2024 showed invasive lobular carcinoma with pleomorphic features grade 2 (3+2+1) no lymph-vascular invasion or DCIS.  Tumor was negative for ER NV and HER2 1+ with Ki-67 5%.  Biopsy of the right axillary lymph node was negative.  Bilateral breast MRI 9/11/2024-scattered fibroglandular tissue, irregular mass inferior lateral right breast 7:00 11 cm from the nipple 1.0 x 1.0 x 0.8 cm and a questionable low right axillary lymph node.    She was taken to the operating room on 12/4/2024 and underwent a right lumpectomy sentinel lymph node procedure.  Pathology showed invasive lobular carcinoma grade 2 measuring 1.3 cm with margins negative for invasive carcinoma.  3 axillary lymph nodes were negative for invasive carcinoma.    The patient's sister and mother had breast cancer [employer]itae 9 gene genetic panel was performed and negative.    Past Medical History:   Diagnosis Date    Anxiety     Arthritis      Asthma 10/01    Rsv    Breast cancer, right     Chronic hypoxic respiratory failure     2023, 2024 COVID AND RSV - FOLLOWED BY DR SAVAGE    COPD (chronic obstructive pulmonary disease)     Depression     Diabetes mellitus     Fissure, anal     GERD (gastroesophageal reflux disease)     Hemorrhoid     History of anemia     History of COVID-19     COVID PNEUMONIA  9/2023, 10/2024    Hyperlipidemia     Hypertension     On home O2     2L NC    Psoriasis     Pulmonary nodule     4 MM LEFT LOWER LOBE NONCALCIFIED PER CT REPORT    RSV infection 10/01/2023    ADMITTED TO [facility]    Sleep apnea     Urinary and fecal incontinence         Past Surgical History:   Procedure Laterality Date    BREAST LUMPECTOMY WITH SENTINEL NODE BIOPSY Right 12/04/2024    Procedure: right breast Kathrine guided lumpectomy with sentinel lymph node biopsy;  Surgeon: Shi Kaur MD;  Location: [facility];  Service: General;  Laterality: Right;    BREAST SURGERY Bilateral 12/04/2024    Procedure: RIGHT - ONCOPLASTIC RECONSTRUCTION LEFT - BREAST REDUCTION FOR SYMMETRY;  Surgeon: Flaquito Yoder MD;  Location: [facility];  Service: Plastics;  Laterality: Bilateral;    COLONOSCOPY N/A     HEMORRHOIDECTOMY  2018    HYSTERECTOMY N/A     OTHER SURGICAL HISTORY      LABIAL SURGERY TO REPLACE URETHRA D/T COMPLICATIOSN FROM PREVIOUS SURGERIES AND MESH    PELVIC FLOOR REPAIR N/A     RECTOCELE REPAIR N/A     TOTAL HIP ARTHROPLASTY Left 04/2023    TUBAL ABDOMINAL LIGATION      URETHRA SURGERY      X4        Current Outpatient Medications on File Prior to Visit   Medication Sig Dispense Refill    albuterol (PROVENTIL) (2.5 MG/3ML) 0.083% nebulizer solution Take 2.5 mg by nebulization 3 (Three) Times a Day.      albuterol sulfate HFA (Proventil HFA) 108 (90 Base) MCG/ACT inhaler Inhale 2 puffs Every 4 (Four) Hours As Needed for Wheezing. 8.5 g 1    Ascorbic Acid (Vitamin C) 500 MG capsule Take 500 mg by mouth Daily. HELD FOR OR      aspirin  81 MG EC tablet Take 1 tablet by mouth Daily. HELD FOR OR      atorvastatin (LIPITOR) 40 MG tablet Take 1 tablet by mouth Daily.      B Complex Vitamins (vitamin b complex) capsule capsule Take 1 capsule by mouth Daily. HELD FOR OR      celecoxib (CeleBREX) 200 MG capsule PER DR ROSA      Cholecalciferol 25 MCG (1000 UT) tablet Take 1 tablet by mouth Daily. HELD FOR OR      Continuous Blood Gluc Sensor (Dexcom G6 Sensor)       Continuous Blood Gluc Transmit (Dexcom G6 Transmitter) misc       furosemide (LASIX) 20 MG tablet Take 1 tablet by mouth Daily As Needed (swelling).      gabapentin (NEURONTIN) 300 MG capsule PER DR ROSA      glipizide (GLUCOTROL XL) 2.5 MG 24 hr tablet Take 1 tablet by mouth Daily.      Insulin Glargine (BASAGLAR KWIKPEN SC) Inject 60 Units under the skin into the appropriate area as directed Daily.      ipratropium-albuterol (DUO-NEB) 0.5-2.5 mg/3 ml nebulizer Take 3 mL by nebulization Every 4 (Four) Hours As Needed for Wheezing. 90 mL 1    levocetirizine (XYZAL) 5 MG tablet Take 1 tablet by mouth Daily.      lisinopril (PRINIVIL,ZESTRIL) 10 MG tablet Take 1 tablet by mouth Daily.      Trelegy Ellipta 100-62.5-25 MCG/ACT inhaler Inhale 1 puff Daily.      [DISCONTINUED] cefadroxil (DURICEF) 500 MG capsule PER DR ROSA      [DISCONTINUED] cyclobenzaprine (FLEXERIL) 10 MG tablet Take 1 tablet by mouth At Night As Needed for Muscle Spasms.      [DISCONTINUED] linaclotide (LINZESS) 290 MCG capsule capsule Take 1 capsule by mouth Daily As Needed.      [DISCONTINUED] meloxicam (MOBIC) 15 MG tablet Take 1 tablet by mouth Daily. HELD FOR OR      [DISCONTINUED] ondansetron ODT (ZOFRAN-ODT) 4 MG disintegrating tablet PER DR ROSA      [DISCONTINUED] traMADol (ULTRAM) 50 MG tablet PER DR ROSA      [DISCONTINUED] zolpidem (Ambien) 10 MG tablet Take 1 tablet by mouth At Night As Needed for Sleep.       No current facility-administered medications on file prior to visit.        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   Vaping Use    Vaping status: Never Used   Substance and Sexual Activity    Alcohol use: Never    Drug use: Never    Sexual activity: Not Currently     Partners: Male     Birth control/protection: Post-menopausal, Hysterectomy        Family History   Problem Relation Age of Onset    Hypertension Mother     Diabetes Mother     Cancer Mother         Bladder cancer Breast Cancer mastectomy    Breast cancer Mother     Asthma Mother     Dementia Mother     Hyperlipidemia Father     Cancer Father     Diabetes Father         [facility]    Colon cancer Father     Alcohol abuse Sister     Cancer Sister         breast, cancer double mastectomy    Cancer Paternal Aunt     Asthma Maternal Grandmother     Alcohol abuse Maternal Grandfather     Cancer Paternal Grandmother     Malig Hyperthermia Neg Hx         Review of Systems   Constitutional: Negative.    HENT: Negative.     Respiratory:  Positive for shortness of breath.    Cardiovascular: Negative.    Gastrointestinal: Negative.    Genitourinary: Negative.    Musculoskeletal: Negative.    Skin: Negative.    Neurological: Negative.    Hematological: Negative.    Psychiatric/Behavioral: Negative.          Objective     Vitals:    24 0916   BP: 136/78   Pulse: 76   Resp: 16   Temp: 96.9 °F (36.1 °C)   TempSrc: Infrared  "  SpO2: 93%   Weight: 103 kg (227 lb 14.4 oz)   Height: 177.8 cm (70\")   PainSc: 0-No pain         12/18/2024     9:16 AM   Current Status   ECOG score 0       Physical Exam    CONSTITUTIONAL: pleasant well-developed adult woman  HEENT: no icterus, no thrush, moist membranes  LYMPH: no cervical or supraclavicular lad  CV: RRR, S1S2, no murmur  RESP: cta bilat, no wheezing, no rales, 02 by NC  BREAST:  bilateral breast scans (reduction/reconstruction)  GI: soft, nontender, no splenomegaly, +BS  MUSC: no edema, normal gait  NEURO: alert and oriented x3, normal strength  PSYCH: normal mood and affect    RECENT LABS:  Hematology WBC   Date Value Ref Range Status   12/18/2024 12.33 (H) 3.40 - 10.80 10*3/mm3 Final     RBC   Date Value Ref Range Status   12/18/2024 5.37 (H) 3.77 - 5.28 10*6/mm3 Final     Hemoglobin   Date Value Ref Range Status   12/18/2024 15.0 12.0 - 15.9 g/dL Final     Hematocrit   Date Value Ref Range Status   12/18/2024 49.2 (H) 34.0 - 46.6 % Final     Platelets   Date Value Ref Range Status   12/18/2024 344 140 - 450 10*3/mm3 Final        Lab Results   Component Value Date    GLUCOSE 210 (H) 12/18/2024    BUN 13 12/18/2024    CREATININE 0.57 12/18/2024     12/18/2024    K 4.2 12/18/2024     12/18/2024    CALCIUM 9.3 12/18/2024    PROTEINTOT 6.3 12/18/2024    ALBUMIN 3.9 12/18/2024    ALT 17 12/18/2024    AST 16 12/18/2024    ALKPHOS 125 (H) 12/18/2024    BILITOT 0.3 12/18/2024    GLOB 2.4 12/18/2024    AGRATIO 1.6 12/18/2024    BCR 22.8 12/18/2024    ANIONGAP 7.5 12/18/2024    EGFR 102.3 12/18/2024     Breast MRI:IMPRESSION:   1. Suspicious right breast mass for which right diagnostic mammogram and ultrasound are recommended.   2.  Questionable prominent right axillary lymph node for which right diagnostic axillary ultrasound is recommended.   3.  No MRI evidence of left breast malignancy.     CT chest 4/8/2024-  IMPRESSION:  Impression:     1. Stable 4 mm noncalcified nodule within the " left lower lobe. No other acute cardiopulmonary disease. Repeat noncontrast CT scan of the chest would be recommended in 12 months to document continued stability.       Assessment & Plan   *[id number] lobular cancer of the right breast triple negative Ki-67 5%  abnormal screening mammography on 9/11/2024 showing scattered fibroglandular densities and a focal asymmetry in the lateral right breast  diagnostic mammogram and ultrasound were performed 9/13/2024 showing at the 7 o'clock position of the right breast mass 1.1 x 1.1 x 0.8 cm in size.    ultrasound biopsy was performed on 9/13/2024 showed invasive lobular carcinoma with pleomorphic features grade 2 (3+2+1) no lymph-vascular invasion or DCIS.  Tumor was negative for ER AK and HER2 1+ with Ki-67 5%.  Biopsy of the right axillary lymph node was negative  Bilateral breast MRI 9/11/2024-scattered fibroglandular tissue, irregular mass inferior lateral right breast 7:00 11 cm from the nipple 1.0 x 1.0 x 0.8 cm and a questionable low right axillary lymph node.  12/4/2024 - right lumpectomy sentinel lymph node procedure.  Pathology showed invasive lobular carcinoma grade 2 measuring 1.3 cm with margins negative for invasive carcinoma.  3 axillary lymph nodes were negative for invasive carcinoma.  Invitae 9 gene genetic panel was performed and negative.    *Lung nodule under surveillance by pulmonary medicine    *Comorbidities-COPD with chronic hypoxic respiratory failure on O2 2 L by nasal cannula, diabetes, hypertension, hyperlipidemia    Oncology plan/recommendations:  Imaging and pathology reviewed with the patient today.  She has a triple negative lobular breast cancer over 1 cm in size increasing risk of residual micrometastatic disease.  She has a very good performance status major comorbidity being COPD requiring oxygen.  She continues to work full-time as a .  I recommended adjuvant chemotherapy to reduce risk of recurrence with TC x 4 cycles.  I  discussed with the patient reasons for treatment risk and side effects of treatment and she is agreeable to proceed.  She needs another 2 or 3 weeks to heal from her reconstructive surgery.  I have requested chemotherapy education.  I will see her back in 3 weeks to reassess wounds and tentatively begin treatment.  We will plan to treat via a PICC line since only 4 treatments will be given.    Thank you for allowing me to precipitate in the care of this pleasant patient.  I spent 80 minutes of time today on the case reviewing her medical records, imaging results pathology results operative reports face-to-face time with the patient discussing pathology need of adjuvant chemotherapy risk and side effects of chemotherapy writing orders documenting care etc.

## 2024-12-19 NOTE — OP NOTE
OPERATIVE REPORT     DATE: 12/4/2024     SURGEON: Shi Kaur MD      ASSISTANT: Sherley Borrego, who was present for necessary suctioning, retracting, suturing throughout the procedure      OPERATION PERFORMED: Right breast Roxy guided lumpectomy with sentinel lymph node biopsy     PREOPERATIVE DIAGNOSIS: invasive lobular carcinoma of the right breast      POSTOPERATIVE DIAGNOSIS: Same     ANESTHESIA: General      SPECIMEN:   Right breast lumpectomy (short stitch superior, long lateral, double anterior)  Additional anterior margin (stitch marks true margin)  Additional posterior margin (stitch marks true margin)  Additional medial margin (stitch marks true margin)  Additional lateral margin (stitch marks true margin)  Additional superior margin (stitch marks true margin)  Additional inferior margin (stitch marks true margin)    Right axillary sentinel lymph node #1   Right axillary sentinel lymph node #2  Right axillary sentinel lymph node #3     DRAINS: None     BLOOD LOSS: Minimal     INDICATION FOR OPERATION:Magda Nath is a 64 y.o. lady who was recently diagnosed with right breast invasive lobular carcinoma.  She ultimately elected to proceed with a right breast ROXY guided lumpectomy with sentinel lymph node biopsy with oncoplastic reconstruction by . All risks (including bleeding, infection, damage to surrounding structures, need for further surgery, pathologic upgrade), benefits and alternatives were explained to the patient who agreed and wished to proceed. Informed consent was signed.      OPERATIVE COURSE: The patient was taken to the operating room, transferred onto the operating room table, and underwent anesthesia without incident. 5 cc of blue dye was injected into the dermal layer of the right nipple areolar complex. The patient was prepped and draped in sterile fashion. A time out was performed and preoperative antibiotics were given.  The Roxy probe was used to identify the strongest  signal.  An incision was marked.  Half percent Marcaine with epinephrine was injected into the skin and subcutaneous tissues.  A curvilinear incision was made along the breast as marked by Dr. Yoder.  Bovie electrocautery was used to create a flap approximately 1 cm in thickness.  The tissue was transected circumferentially along the strongest Kathrine signal.  The tissue was amputated at its base.  It was marked as listed above.  Specimen radiograph confirmed clip, Kathrine, and abnormal breast tissue.  It was sent for fresh permanent specimen. Additional margins were taken along all borders. These were done with Allis clamps and approximately 1 cm in thickness.  They were marked as listed above.  The area was irrigated and appeared hemostatic.  There were no signs of bleeding. Clips were left for radiation targeting.     We then performed a sentinel lymph node biopsy. A curvilinear incision was made just inferior to the hairbearing area of the right axilla. Bovie electrocautery was used to dissect down through the skin and subcutaneous tissues and through the clavipectoral fascia into the axilla. Three sentinel lymph nodes were identified. These were removed with Bovie electrocautery and clips across all major lymphovascular structures. Once this was done, there were no hot, blue, or palpable lymph nodes remaining. The area was irrigated and appeared hemostatic.  These were sent for permanent specimen.    The rest of the local anesthetic was administered.  The incisions were closed with interrupted 3-0 Vicryl sutures and a running 4-0 Monocryl suture.  Skin glue was placed over the incision.  The patient tolerated the procedure well. All needle and lap counts were correct at the end of the case. The patient was then awoken from anesthesia and taken to recovery for further monitoring.     Phil Campbell Node Biopsy for Breast Cancer - Right  Operation performed with curative intent. Yes   Tracer(s) used to identify sentinel  nodes in the upfront surgery (non-neoadjuvant) setting (select all that apply). Dye and Radioactive tracer   Tracer(s) used to identify sentinel nodes in the neoadjuvant setting (select all that apply). N/A   All nodes (colored or non-colored) present at the end of a dye-filled lymphatic channel were removed. Yes   All significantly radioactive nodes were removed. Yes   All palpably suspicious nodes were removed. Yes   Biopsy-proven positive nodes marked with clips prior to chemotherapy were identified and removed. N/A              Shi Kaur MD   General and Endoscopic Surgery  [facility]     [address], [address]  [place], [place], [address]  P: [phone]  F: [phone]

## 2024-12-20 ENCOUNTER — PATIENT ROUNDING (BHMG ONLY) (OUTPATIENT)
Dept: ONCOLOGY | Facility: CLINIC | Age: 64
End: 2024-12-20
Payer: COMMERCIAL

## 2024-12-20 NOTE — PROGRESS NOTES
A My-Chart message been sent to the patient for PATIENT ROUNDING with [facility]

## 2024-12-23 ENCOUNTER — PATIENT OUTREACH (OUTPATIENT)
Dept: OTHER | Facility: HOSPITAL | Age: 64
End: 2024-12-23
Payer: COMMERCIAL

## 2024-12-23 NOTE — PROGRESS NOTES
Called Ms. Nath to see how she was doing. She stated she is healing from surgery well. She had concerns about her FMLA and Short term paperwork for work. She stated she took screen shots of her info on her [employer] and sent them to her boss so she can get paid for the month of December. Otherwise, she stated she is doing well and has no needs at this time. She was thankful for the call and will reach out if any questions or needs arise.

## 2024-12-30 ENCOUNTER — OFFICE VISIT (OUTPATIENT)
Dept: SURGERY | Facility: CLINIC | Age: 64
End: 2024-12-30
Payer: COMMERCIAL

## 2024-12-30 VITALS
DIASTOLIC BLOOD PRESSURE: 98 MMHG | HEIGHT: 70 IN | OXYGEN SATURATION: 94 % | WEIGHT: 227 LBS | SYSTOLIC BLOOD PRESSURE: 160 MMHG | BODY MASS INDEX: 32.5 KG/M2 | HEART RATE: 86 BPM

## 2024-12-30 DIAGNOSIS — C50.919 MALIGNANT NEOPLASM OF FEMALE BREAST, UNSPECIFIED ESTROGEN RECEPTOR STATUS, UNSPECIFIED LATERALITY, UNSPECIFIED SITE OF BREAST: Primary | ICD-10-CM

## 2024-12-30 NOTE — PROGRESS NOTES
General Surgery Breast Cancer History and Physical Exam      Summary:    Magda Nath is a 63 y.o. lady who presents with a history of right breast invasive lobular carcinoma: Grade II,  ER-/AK-, Her2-; [id number], Stage I.       A multidisciplinary plan has been formulated for the patient:    (1) Breast Surgical Oncology:  -[employer] 9 panel genetic testing: negative.   -Nurse navigator following.   -S/p right breast Kathrine localized lumpectomy with SLN biopsy with oncoplastic reconstruction 12/2024.   -Annual mammogram due 9/2025.   -Follow up with me 10/2025.      (2) Medical Oncology:  -Following with Dr. Vallejo. Plan for adjuvant TC x4. To start 1/6/2025.     (3) Radiation Oncology:  -Appointment with Dr. Juarez 1/3/2025.     Referring Provider: SVETA Bob     Chief Complaint: abnormal breast imaging     History of Present Illness: Ms. Magda Nath is a 63 y.o. year old lady, seen at the request of Francesca Adrian PA for a new diagnosis of right breast cancer.       This was initially detected as an imaging abnormality. She has had annual mammograms each year. She denies any prior history of abnormal mammograms or breast biopsies. Her work-up is detailed in the oncologic history below.      She denies any breast lumps, pain, skin changes, or nipple discharge. She has a family history of breast cancer in a sister (age 60's) and mother (age 65). She denies any family history of ovarian cancer.     12/30/2024 She presents today for follow up. She has done well since surgery. Her pain is controlled. She is getting back to her daily activities. She has no concerns. She plans to start chemo soon.     Workup of Current Diagnosis:    9/11/2024 Bilateral Breast MRI   FINDINGS:   Amount of Fibroglandular Tissue: Scattered fibroglandular tissue. Background Parenchymal Enhancement: Mild.   RIGHT BREAST:   There is an irregular enhancing mass within the inferior, slightly lateral right breast, posterior depth  at the 7:00 position, 11 cm from the nipple measuring 1.0 x 1.0 x 0.8 cm (AP, transverse, craniocaudal dimensions respectively) (subtraction series, image 102). This demonstrates type III, washout kinetics. A probable correlate is seen on the same day screening mammogram, described is a focal asymmetry.   There is a questionable prominent low right axillary lymph node (subtraction series, image 45, series  image 42). No morphologically suspicious right internal mammary chain lymphadenopathy.  LEFT BREAST:   No suspicious finding is demonstrated within the left breast. No morphologically suspicious left axillary or left internal mammary chain lymphadenopathy.   VISUALIZED CHEST/ABDOMEN:   No suspicious finding is demonstrated within the visualized chest or abdomen.    IMPRESSION:   1. Suspicious right breast mass for which right diagnostic mammogram and ultrasound are recommended.   2.  Questionable prominent right axillary lymph node for which right diagnostic axillary ultrasound is recommended.   3.  No MRI evidence of left breast malignancy.   RECOMMENDATION:   Right diagnostic mammogram and ultrasound, right axillary ultrasound, and ultrasound-guided biopsy/biopsies as clinically indicated.  FINAL ASSESSMENT: BI-RADS Category 4, Suspicious abnormality.  Biopsy should be considered.      9/11/2024 Bilateral Screening Mammogram:  BREAST DENSITY: There are scattered areas of fibroglandular density.   RISK: Tyrer-Byron risk score is 26%.   FINDINGS:   There is a focal asymmetry within the central, slightly lateral right breast, posterior depth. No suspicious findings are present in the left breast.   IMPRESSION: Right breast focal asymmetry.   RECOMMENDATION:   1.  Right Diagnostic Mammogram and possible ultrasound.   2.  LIFETIME RISK: Due to the patient's increased risk which is calculated based on the history provided, recommend referral to our [facility], annual screening breast MRI, and annual  screening mammography. If you would like to schedule an appointment with our [facility], please call [phone]). The patient had a same day screening breast MRI. Please see separate report for details.   FINAL ASSESSMENT: BI-RADS Category 0: Incomplete      9/13/2024 Right Breast Diagnostic Mammogram and US:   BREAST DENSITY: There are scattered areas of fibroglandular density.   RISK: Tyrer-Byron risk score is 22%.  MAMMOGRAPHIC FINDINGS:   Previously questioned focal asymmetry persists on spot compression imaging and appears to localize to the lower outer right breast, posterior depth. There is a questionable asymmetry within the lateral subareolar region of the right breast.   SONOGRAPHIC FINDINGS:   Real time sonography was performed with permanent image documentation. Targeted sonographic imaging of the lower outer right breast, lateral subareolar region of the right breast, and right axilla was performed.   At the 7:00 position, 10 cm from the nipple there is an irregular hypoechoic mass measuring 1.1 x 1.1 x 0.8 cm. This corresponds to the suspicious MRI and mammographic finding in question.   At the 9:00 position, 3 cm from the nipple there are 2 adjacent predominantly anechoic masses measuring up to 0.3 cm each. This is felt to correspond to asymmetry seen mammographically. This is favored to represent focal fibrocystic type changes, however the margins are slightly microlobulated.   Targeted sonographic imaging of the right axilla demonstrates 1 prominent lymph node with the cortex measuring 0.5 cm. This is felt to correspond to the questionable prominent lymph nodes seen on recent breast MRI.   Benign-appearing cyst is noted at the 9:00 position, 1 cm from the nipple.   IMPRESSION:   1. Suspicious right breast mass 7:00 position, 10 cm from the nipple for which ultrasound-guided biopsy is recommended.   2.  Indeterminate right breast masses 9:00 position, 3 cm from the nipple for  which ultrasound-guided biopsy is recommended.   3.  Indeterminate right axillary lymph node for which ultrasound-guided biopsy is recommended.   RECOMMENDATION: Three site right breast/axilla ultrasound guided biopsies as detailed above.   Results and recommendations were discussed with the patient by Dr. Zarate at the end of the examination.   FINAL ASSESSMENT: BI-RADS Category 4, Suspicious abnormality.  Biopsy should be considered.      9/13/2024 Right Breast US Guided Biopsy:   Right breast 9:00 3 cm from the nipple: Benign breast tissue with apocrine metaplasia and adenosis, pathology is benign and concordant   Axilla, right lymph node: Lymph node, negative for metastatic carcinoma. Pathology is benign and may be concordant.   Right breast 7:00 10 cm from nipple: Invasive lobular carcinoma with focal  pleomorphic and upper in features. Pathology is malignant and concordant.   Recommendation is for surgical consultation for further management and treatment of right breast biopsy-proven invasive lobular carcinoma at 7:00, and appropriate staging of the right axilla. I phoned the patient with all biopsy results and recommendations on 09/18/2024 at 1:40 PM. Patient may wish to be seen through the cancer center, however she may also consider an outside referral closer to her house. Therefore, appropriate member of the breast care team will also called and notify the patient's referring provider (patient gave physician assistant Pulido ) in [facility] as an outside referral to a surgeon may be indicated. The patient is aware a surgical consultation is recommended and if not   scheduled through the [facility], she should follow-up with her referring provider for appropriate referral.      9/13/2024 Pathology:   A.  BREAST, RIGHT, 9:00 3CM FN, CORE NEEDLE BIOPSY:   - Benign breast tissue with apocrine metaplasia and adenosis.     B.  AXILLA, RIGHT LYMPH NODE, CORE NEEDLE BIOPSY:   - Lymph node, negative  for metastatic carcinoma.     C.  BREAST, RIGHT, 7:00, 10CMFN, CORE NEEDLE BIOPSY:   - INVASIVE LOBULAR CARCINOMA WITH FOCAL PLEOMORPHIC AND APOCRINE FEATURES.   Summary:   - Histologic grade:  II (tubule score 3, nuclear score 2, mitoses score 1)   - Size of largest focus: 8.5 mm   -  Lymphovascular invasion : Not identified.   - Ductal  carcinoma in  situ:  Not identified.   - Lobular  neoplasia i n situ: Not identified.   - Biomarkers:        - Estrogen receptor:     Negative (0%)        - Progesterone receptor:     Negative (0%)        - Her-2/vandana by IHC:     Negative (1+)        - Ki-67:     5%        - Androgen Receptor:     Positive      12/4/2024 Right breast Kathrine guided lumpectomy with sentinel lymph node biopsy   Final Diagnosis   1.  Breast, right, lumpectomy: (10.8 g)  A.  Invasive lobular carcinoma, [place] histologic grade 2 (tubules = 3, nuclei = 2, mitoses = 1), measuring 13 mm maximally, extending to the inked posterior margin  (superseded by specimen #6), identified associated with coil clip and biopsy site change; see synoptic template for tumor details.     2.  Breast, right superior margin, inked and oriented excision:               A.  Benign breast parenchyma; margin free of atypia, in situ and invasive malignancy.     3.  Breast, right medial margin, inked and oriented excision:               A.  Benign breast parenchyma; margin free of atypia, in situ and invasive malignancy.     4.  Breast, right lateral margin, inked and oriented excision:               A.  Benign breast parenchyma; margin free of atypia, in situ and invasive malignancy.     5.  Breast, right inferior margin, inked and oriented excision:               A.  Benign breast parenchyma; margin free of atypia, in situ and invasive malignancy.     6.  Breast, right posterior margin, inked and oriented excision:               A.  Benign breast parenchyma and skeletal muscle; margin free of atypia, in situ and invasive  malignancy.     7.  Breast, right anterior margin, inked and oriented excision:               A.  Breast parenchyma with focal atypical lobular hyperplasia; margin free of atypia, in situ and invasive malignancy.     8.  Lymph node, right sentinel #1, excision: (H&E, AE1/3)               A.  1 benign lymph node (0/1).               B.   Coil clip and biopsy site change identified.     9.  Lymph node, right sentinel #2, excision: (H&E, AE1/3)               A.  1 benign lymph node (0/1).     10.  Lymph node, right sentinel #3, excision: (H&E, AE1/3)               A.  1 benign lymph node (0/1).     11. Breast, left, reduction mammoplasty (771 g)               A.  Benign skin, subcutaneous tissue and breast parenchyma.     12.  Breast, right, reduction mammoplasty (784 g)               A.  Benign skin, subcutaneous tissue and breast parenchyma.     Gynecologic History:   . P:2 AB:0  Age at first childbirth: 22  Lactation/How long: yes x6 weeks  Age at menarche: 16  Age at menopause: hysterectomy at age 41  Total years of oral contraceptive use: 15 years previously  Total years of hormone replacement therapy: estradiol, stopped at diagnosis      Past Medical History:   HTN  DM   Osteoarthritis      Past Surgical History:    Colonoscopy   Hemorrhoidectomy   Pelvic floor repair   Hysterectomy   Rectocele repair   Left total hip arthroplasty   Urethra surgery      Family History:    As above     Social History:  Denies tobacco use, few cigarettes since diagnosis, on nicotine patch   Occasional alcohol use     Allergies:   Allergies             Allergies   Allergen Reactions    Erythromycin Rash       ALL MYCINS BREAK HER OUT    Nitrofurantoin Headache and Rash       CAUSES FEVER, HEADACHES    Codeine Headache       headaches         Medications:      Current Medications      Current Outpatient Medications:     albuterol (PROVENTIL) (2.5 MG/3ML) 0.083% nebulizer solution, Take 2.5 mg by nebulization 4 (Four) Times a Day.,  Disp: , Rfl:     albuterol sulfate HFA (Proventil HFA) 108 (90 Base) MCG/ACT inhaler, Inhale 2 puffs Every 4 (Four) Hours As Needed for Wheezing., Disp: 8.5 g, Rfl: 1    Ascorbic Acid (Vitamin C) 500 MG capsule, Take 500 mg by mouth Daily., Disp: , Rfl:     aspirin 81 MG EC tablet, Take 1 tablet by mouth Daily., Disp: , Rfl:     atorvastatin (LIPITOR) 40 MG tablet, Take 1 tablet by mouth Daily., Disp: , Rfl:     B Complex Vitamins (vitamin b complex) capsule capsule, Take  by mouth Daily., Disp: , Rfl:     busPIRone (BUSPAR) 5 MG tablet, Take 1 tablet by mouth 3 (Three) Times a Day As Needed (anxiety)., Disp: , Rfl:     Cholecalciferol 25 MCG (1000 UT) tablet, Take 1 tablet by mouth Daily., Disp: , Rfl:     Continuous Blood Gluc Sensor (Dexcom G6 Sensor), , Disp: , Rfl:     Continuous Blood Gluc Transmit (Dexcom G6 Transmitter) misc, , Disp: , Rfl:     cyclobenzaprine (FLEXERIL) 10 MG tablet, Take 1 tablet by mouth At Night As Needed for Muscle Spasms., Disp: , Rfl:     estradiol (ESTRACE) 2 MG tablet, Take 1 tablet by mouth Daily., Disp: , Rfl:     ferrous sulfate 325 (65 FE) MG tablet, Take 1 tablet by mouth 2 (Two) Times a Day With Meals., Disp: , Rfl:     furosemide (LASIX) 20 MG tablet, Take 1 tablet by mouth Daily As Needed (swelling)., Disp: , Rfl:     Hydrocortisone, Perianal, (Anusol-HC) 2.5 % rectal cream, Apply rectally 3 times daily.  Include applicator., Disp: 30 g, Rfl: 1    Insulin Glargine (BASAGLAR KWIKPEN SC), Inject 60 Units under the skin into the appropriate area as directed Daily., Disp: , Rfl:     ipratropium-albuterol (DUO-NEB) 0.5-2.5 mg/3 ml nebulizer, Take 3 mL by nebulization Every 4 (Four) Hours As Needed for Wheezing., Disp: 90 mL, Rfl: 1    levocetirizine (XYZAL) 5 MG tablet, Take 1 tablet by mouth Every Evening., Disp: , Rfl:     lisinopril (PRINIVIL,ZESTRIL) 10 MG tablet, Take 1 tablet by mouth Daily., Disp: , Rfl:     meloxicam (MOBIC) 15 MG tablet, Take 1 tablet by mouth Daily.,  Disp: , Rfl:     nicotine (NICODERM CQ) 21 MG/24HR patch, Place 1 patch on the skin as directed by provider Daily., Disp: 30 patch, Rfl: 0    Symbicort 160-4.5 MCG/ACT inhaler, Inhale 2 puffs 2 (Two) Times a Day., Disp: , Rfl:     zolpidem (Ambien) 10 MG tablet, Take 1 tablet by mouth At Night As Needed for Sleep., Disp: , Rfl:          Laboratory Values:    Labs from 2024 reviewed by me      Review of Systems:   Influenza-like illness: no fever, no  cough, no  sore throat, no  body aches, no loss of sense of taste or smell, no known exposure to person with Covid-19.  Constitutional: Negative for fevers or chills  HENT: Negative for hearing loss or runny nose  Eyes: Negative for vision changes or scleral icterus  Respiratory: Negative for cough or shortness of breath  Cardiovascular: Negative for chest pain or heart palpitations  Gastrointestinal: Negative for abdominal pain, nausea, vomiting, constipation, melena, or hematochezia  Genitourinary: Negative for hematuria or dysuria  Musculoskeletal: Negative for joint swelling or gait instability  Neurologic: Negative for tremors or seizures  Psychiatric: Negative for suicidal ideations or depression  All other systems reviewed and negative     Physical Exam:   ECO - Asymptomatic  Constitutional: Well-developed well-nourished, no acute distress  Eyes: Conjunctiva normal, sclera nonicteric  ENMT: Hearing grossly normal, oral mucosa moist  Neck: Supple, no palpable mass, trachea midline  Respiratory: Clear to auscultation, normal inspiratory effort  Cardiovascular: Regular rate, no peripheral edema, no jugular venous distention  Breast: symmetric, bilateral breast incisions clean and dry, no erythema or drainage, no hematoma or seromas  Right: No visible abnormalities on inspection while seated, with arms raised or hands on hips. No masses, skin changes, or nipple abnormalities.  Left: No visible abnormalities on inspection while seated, with arms raised or  hands on hips. No masses, skin changes, or nipple abnormalities.  No clinical chest wall involvement.  Gastrointestinal: Soft, nontender  Lymphatics (palpable nodes): No cervical, supraclavicular or axillary lymphadenopathy  Skin:  Warm, dry, no rash on visualized skin surfaces  Musculoskeletal: Symmetric strength, normal gait  Psychiatric: Alert and oriented ×3, normal affect      KOBI MOORE M.D.  General and Endoscopic Surgery  [facility]     [address], [address]  [place], [place], [address]  P: [phone]  F: [phone]

## 2025-01-02 ENCOUNTER — TELEPHONE (OUTPATIENT)
Dept: RADIATION ONCOLOGY | Facility: HOSPITAL | Age: 65
End: 2025-01-02
Payer: COMMERCIAL

## 2025-01-02 ENCOUNTER — LAB (OUTPATIENT)
Dept: LAB | Facility: HOSPITAL | Age: 65
End: 2025-01-02
Payer: COMMERCIAL

## 2025-01-02 ENCOUNTER — OFFICE VISIT (OUTPATIENT)
Dept: ONCOLOGY | Facility: CLINIC | Age: 65
End: 2025-01-02
Payer: COMMERCIAL

## 2025-01-02 ENCOUNTER — TELEPHONE (OUTPATIENT)
Dept: SURGERY | Facility: CLINIC | Age: 65
End: 2025-01-02
Payer: COMMERCIAL

## 2025-01-02 VITALS
DIASTOLIC BLOOD PRESSURE: 71 MMHG | OXYGEN SATURATION: 93 % | TEMPERATURE: 99 F | WEIGHT: 227.6 LBS | HEIGHT: 70 IN | BODY MASS INDEX: 32.58 KG/M2 | HEART RATE: 83 BPM | SYSTOLIC BLOOD PRESSURE: 101 MMHG

## 2025-01-02 DIAGNOSIS — C50.919 MALIGNANT NEOPLASM OF FEMALE BREAST, UNSPECIFIED ESTROGEN RECEPTOR STATUS, UNSPECIFIED LATERALITY, UNSPECIFIED SITE OF BREAST: Primary | ICD-10-CM

## 2025-01-02 RX ORDER — ONDANSETRON 8 MG/1
8 TABLET, FILM COATED ORAL 3 TIMES DAILY PRN
Qty: 30 TABLET | Refills: 5 | Status: SHIPPED | OUTPATIENT
Start: 2025-01-02

## 2025-01-02 RX ORDER — DEXAMETHASONE 4 MG/1
TABLET ORAL
Qty: 12 TABLET | Refills: 3 | Status: SHIPPED | OUTPATIENT
Start: 2025-01-02

## 2025-01-02 NOTE — TELEPHONE ENCOUNTER
Called and spoke with pt, I let her know that I [facility]ed over her [facility] [employer] and the fax went through. I let her know that I will scan it into her MyChart as well. Pt voiced understanding. LA extension til 1/6/25.

## 2025-01-02 NOTE — PROGRESS NOTES
Enter Query Response Below      Query Response: Acute respiratory failure was supported with additional clinical indicators including room air saturation below 88%, improved with oxygen administration of 2L to 90-95%. Thus meeting criteria for acute hypoxic respiratory failure due to acute exacerbation of COPD. Patient also responded well to steroids and nebulizer treatments.              If applicable, please update the problem list.       Patient: Magda Nath        : 1960  Account: [id number]           Admit Date: 10/1/2023        How to Respond to this query:       a. Click New Note     b. Answer query within the yellow box.                c. Update the Problem List, if applicable.      If you have any questions about this query contact me at: [email or web address]     Dr. Thomas    62 year old female admitted with acute COPD exacerbation and acute hypoxic respiratory failure due to rhinovirus infection per discharge summary. O2 sat as low as 84% on room air on admission, treated with supplemental oxygen at 2 LPM with recovery to 90-95%. Treatment also included breathing treatments and IV solumedrol.     After study, was acute respiratory failure clinically supported during this admission?    -Acute respiratory failure was supported with additional clinical indicators:____________  -Acute respiratory failure was not supported, hypoxia only  -Other- specify_____________  -Unable to determine        By submitting this query, we are merely seeking further clarification of documentation to accurately reflect all conditions that you are monitoring, evaluating, treating or that extend the hospitalization or utilize additional resources of care. Please utilize your independent clinical judgment when addressing the question(s) above.     This query and your response, once completed, will be entered into the legal medical record.    Sincerely,  Nisha Albert RN  Clinical Documentation Integrity  How Severe Are Your Spot(S)?: mild Have Your Spot(S) Been Treated In The Past?: has not been treated Program      Hpi Title: Evaluation of Skin Lesions

## 2025-01-02 NOTE — PROGRESS NOTES
TREATMENT  PREPARATION    Magda Nath  [id number]  1960    Chief Complaint: Treatment preparation and needs assessment    History of present illness:  Magda Nath is a 64 y.o. year old female who is here today for treatment preparation and needs assessment.  The patient has been diagnosed with   Encounter Diagnosis   Name Primary?    Malignant neoplasm of female breast, unspecified estrogen receptor status, unspecified laterality, unspecified site of breast Yes    and is scheduled to begin treatment with:     Oncology History:    Oncology/Hematology History   Malignant neoplasm of female breast   10/21/2024 Initial Diagnosis    Malignant neoplasm of female breast     1/8/2025 -  Chemotherapy    OP BREAST TC DOCEtaxel / Cyclophosphamide         The current medication list and allergy list were reviewed and reconciled.     Past Medical History, Past Surgical History, Social History, Family History have been reviewed and are without significant changes except as mentioned.    Physical Exam:    Vitals:    01/02/25 1431   BP: 101/71   Pulse: 83   Temp: 99 °F (37.2 °C)   SpO2: 93%     Vitals:    01/02/25 1431   PainSc: 0-No pain        ECOG score: 0             Physical Exam  HENT:      Head: Normocephalic and atraumatic.   Eyes:      Extraocular Movements: Extraocular movements intact.      Conjunctiva/sclera: Conjunctivae normal.   Pulmonary:      Effort: Pulmonary effort is normal. No respiratory distress.   Neurological:      General: No focal deficit present.      Mental Status: She is alert and oriented to person, place, and time.   Psychiatric:         Mood and Affect: Mood normal.         Behavior: Behavior normal.           NEEDS ASSESSMENTS    Genetics  The patient's new diagnosis and family history have been reviewed for genetic counseling needs. The patient will not be referred..     Psychosocial and Barriers to care  The patient has completed a PHQ-9 Depression Screening and the Distress  Thermometer (DT) today.  PHQ-9 results show PHQ-2 Total Score:   PHQ-9 Total Score:        The patient scored their distress today as Distress Level: 0-No distress on a scale of 0-10 with 0 being no distress and 10 being extreme distress. Problems marked by the patient as being an issue for them within the last week include   .      Results were reviewed along with psychosocial resources offered by our cancer center.  Our Supportive Oncology team will be flagged for a score of 4 or above, and a same day call will be made for a score of 9 or 10.  A mental health referral is offered at that time. Patients who score less than 4 have been educated on our support services and can be referred to our  upon request.  The patient will not be referred to our .       Nutrition  The patient has completed the malnutrition screening today. They scored Malnutrition Screening Tool  Have you recently lost weight without trying?  If yes, how much weight have you lost?: 0--> No  Have you been eating poorly because of a decreased appetite?: 0--> No  MST score: 0   with a score of 0-1 meaning not at risk in a score of 2 or greater meaning at risk.  Patients with a score of 3 or higher will be referred to our oncology dietitian for support. Patients beginning at risk treatment regimens or who have dietary concerns will also be referred to our oncology dietitian. The patient will not be referred.    Functional Assessment  Persons who are age 70 or greater will be screened for qualification of a comprehensive geriatric assessment by our survivorship nurse practitioner.  Older adults with cancer face unique challenges. These may include an increased risk of drug reactions, financial burdens, and caregiver stress. The patient scored   . Patients scoring 14 or lower will referred for an older adult functional assessment with the survivorship advanced practice registered nurse to ensure all needed support is provided  "as patients plan for their treatments. NOT APPLICABLE    Intravenous Access Assessment  The patient and I discussed planned intravenous chemo/biotherapy as well as other IV treatments that are often needed throughout the course of treatment. These may include, but are not limited to blood transfusions, antibiotics, and IV hydration. Discussed that depending on selected treatment and vein assessment, patient may require venous access device (VAD) which could include but not limited to a Mediport or PICC line. Risks and benefits of VADs reviewed. The patient will be treated via PICC.    Reproductive/Sexual Activity   People should avoid becoming pregnant and should not get a partner pregnant while undergoing chemo/biotherapy.  People of childbearing age should use effective contraception during active therapy. The best recommendation for all people is to use a barrier method for a minimum of 1 week after the last infusion of chemo/biotherapy to prevent your partner being exposed to byproducts from treatment medications in bodily fluids. Effective contraception should be discussed with your oncology team to make sure it is safe to take based on your diagnosis. Possible options include oral contraceptives, barrier methods. Chemo/biotherapy can change your ability to reproduce children in the future.  There are options for fertility preservation. NOT APPLICABLE    Advanced Care Planning  Advance Care Planning   The patient and I discussed advanced care planning, \"Conversations that Matter\".   This service is offered for development of advance directives with a certified ACP facilitator.  The patient does have an up-to-date advanced directive. This document is on file with our office. The patient is not interested in an appointment with one of our facilitators to create or update their advanced directives.    Have you reviewed your Advance Directive and is it valid for this stay?: Yes  Patient Requests Assistance on " Advance Directives: Patient Declined          Smoking cessation  Tobacco Use: Medium Risk (1/2/2025)    Patient History     Smoking Tobacco Use: Former     Smokeless Tobacco Use: Never     Passive Exposure: Past       Patient and I discussed their tobacco use history. Referral will not be made for smoking cessation.      Palliative Care  When appropriate, the patient and I discussed the availability palliative care services and when appropriate Hospice care. Palliative care is not the same as Hospice care which was explained to the patient.NOT APPLICABLE.    Survivorship   When appropriate, we discussed that we will refer the patient to survivorship clinic to discuss next steps following completion of planned treatment.  Reviewed this visit will include assessment of your physical, psychological, functional, and spiritual needs as a survivor and the need at attend this visit when scheduled.    TREATMENT EDUCATION    Today I met with the patient to discuss the chemo/biotherapy regimen recommended for treatment of Malignant neoplasm of female breast, unspecified estrogen receptor status, unspecified laterality, unspecified site of breast  - dexAMETHasone (DECADRON) 4 MG tablet  - ondansetron (ZOFRAN) 8 MG tablet  .  The patient was given explanation of treatment premed side effects including office policy that prohibits patients to drive if sedating medications are administered, MD explanation given regarding benefits, side effects, toxicities and goals of treatment.  The patient received a Chemotherapy/Biotherapy Plan Summary including diagnosis and explanation of specific treatment plan.    SIDE EFFECTS:  Common side effects were discussed with the patient and/or significant other.  Discussion included where applicable hair loss/discoloration, anemia/fatigue, infection/chills/fever, appetite, bleeding risk/precautions, constipation, diarrhea, mouth sores, taste alteration, loss of appetite, nausea/vomiting,  peripheral neuropathy, skin/nail changes, rash, muscle aches/weakness, photosensitivity, weight gain/loss, hearing loss, dizziness, menopausal symptoms, menstrual irregularity, sterility, high blood pressure, heart damage, liver damage, lung damage, kidney damage, DVT/PE risk, fluid retention, pleural/pericardial effusion, somnolence, electrolyte/LFT imbalance, vein exercises and/or the possible need for vascular access/port placement.  The patient was advised that although uncommon, leakage of an infused medication from the vein or venous access device may lead to skin breakdown and/or other tissue damage.  The patient was advised that he/she may have pain, bleeding, and/or bruising from the insertion of a needle in their vein or venous access device (port).  The patient was further advised that, in spite of proper technique, infection with redness and irritation may rarely occur at the site where the needle was inserted.  The patient was advised that if complications occur, additional medical treatment is available.  Finally, where applicable we have reviewed rare but potential immune mediated side effects including shortness of breath, cough, chest pain (pneumonitis), abdominal pain, diarrhea (colitis), thyroiditis (hypothyroid or hyperthyroid), hepatitis and liver dysfunction, nephritis and renal dysfunction.    Discussion also included side effects specific to drugs in the treatment plan, specifically:    Treatment Plans       Name Type Plan Dates Plan Provider         Active    OP BREAST TC DOCEtaxel / Cyclophosphamide ONCOLOGY TREATMENT 1/2/2025 - Present Alexey Vallejo MD                      Questions answered and additional information discussed on topics including:  Anemia, Thrombocytopenia, Neutropenia, Nutrition and appetite changes, Constipation, Diarrhea, Nausea & vomiting, Mouth sores, Alopecia, Nervous system changes, Pain, Skin & nail changes, Organ toxicities, and Hand/Foot Cooling        Assessment and Plan:    Diagnoses and all orders for this visit:    1. Malignant neoplasm of female breast, unspecified estrogen receptor status, unspecified laterality, unspecified site of breast (Primary)  -     dexAMETHasone (DECADRON) 4 MG tablet; Take 2 tablets oral twice a day for 3 consecutive days beginning the day before chemotherapy and continue for 6 doses.  Dispense: 12 tablet; Refill: 3  -     ondansetron (ZOFRAN) 8 MG tablet; Take 1 tablet by mouth 3 (Three) Times a Day As Needed for Nausea or Vomiting.  Dispense: 30 tablet; Refill: 5      No orders of the defined types were placed in this encounter.        The patient and I have reviewed their diagnosis and scheduled treatment plan. Needs assessment was completed where applicable including genetics, psychosocial needs, barriers to care, VAD evaluation, advanced care planning, survivorship, and palliative care services where indicated. Referrals have been ordered as appropriate based upon evaluation today and patient desires.   Chemo/biotherapy teaching was completed today and consent obtained. See separate documentation for further details.  Adequate time was given to answer questions.  Patient made aware of their care team members and contact information if they have questions or problems throughout the treatment course.  Discussion held and written information provided describing frequency of office visits and ongoing monitoring throughout the treatment plan.     Reviewed with patient any prescribed medication sent to pharmacy.  Education provided regarding proper storage, safe handling, and proper disposal of unused medication.  Proper handling of body fluids and waste discussed and written information provided.  If appropriate, patient had pretreatment labs drawn today.  Discussed monitoring blood sugar due to need for steroids around the days of treatment.  Patient on Dexcom.    Learning assessment completed at initial patient encounter.  See separate flowsheet. Chemo/biotherapy education comprehension assessed at today's visit.    I spent 68 minutes caring for Magda on this date of service. This time includes time spent by me in the following activities: preparing for the visit, reviewing tests, obtaining and/or reviewing a separately obtained history, performing a medically appropriate examination and/or evaluation, counseling and educating the patient/family/caregiver, ordering medications, tests, or procedures, and documenting information in the medical record.     Aleta Vincent, APRN   01/02/25

## 2025-01-02 NOTE — TELEPHONE ENCOUNTER
----- Message from Shi Kaur sent at 1/1/2025  6:12 PM EST -----  Regarding: FMLA  Can you extend her FMLA until 1/6 please?

## 2025-01-02 NOTE — PROGRESS NOTES
[facility] Hematology/Oncology Treatment Plan Summary    Name: Magda Nath  Acct# [id number]  MD: Dr. Vallejo    Diagnosis:     ICD-10-CM ICD-9-CM   1. Malignant neoplasm of female breast, unspecified estrogen receptor status, unspecified laterality, unspecified site of breast  C50.919 174.9     Stage: [id number]       Goal of treatment: adjuvant    Treatment Medication(s):   Docetaxel  Cyclophosphamide  Neulasta    Frequency: Every 21 days    Number of cycles: 4    Starting on: 1/8/2025      Items for home use: Thermometer    Rx written for: [x] Nausea    [x] Pre-Treatment   dexamethasone 4 mg 2 tabs by mouth twice daily on the day before, day of, and day after treatment and ondansetron 8 mg by mouth every 8 hours as needed for nausea    Notes:     Next Steps: PICC     Completing Provider: EDWIN Jacob           Date/time: 01/02/2025      Please note: You will be seen by a provider frequently with your treatment plan. This plan may change depending on many factors, if so, this will be discussed with you by your physician.  Last update 03/2022.

## 2025-01-03 ENCOUNTER — CONSULT (OUTPATIENT)
Dept: RADIATION ONCOLOGY | Facility: HOSPITAL | Age: 65
End: 2025-01-03
Payer: COMMERCIAL

## 2025-01-03 VITALS
HEART RATE: 95 BPM | BODY MASS INDEX: 33.09 KG/M2 | DIASTOLIC BLOOD PRESSURE: 80 MMHG | SYSTOLIC BLOOD PRESSURE: 125 MMHG | OXYGEN SATURATION: 92 % | WEIGHT: 230.6 LBS

## 2025-01-03 DIAGNOSIS — C50.919 MALIGNANT NEOPLASM OF FEMALE BREAST, UNSPECIFIED ESTROGEN RECEPTOR STATUS, UNSPECIFIED LATERALITY, UNSPECIFIED SITE OF BREAST: Primary | ICD-10-CM

## 2025-01-03 PROCEDURE — G0463 HOSPITAL OUTPT CLINIC VISIT: HCPCS | Performed by: RADIOLOGY

## 2025-01-03 NOTE — PROGRESS NOTES
Subjective     REASON FOR CONSULTATION:  breast cancer  Provide an opinion on any further workup or treatment                             REQUESTING PHYSICIAN:  Vincent    RECORDS OBTAINED:  Records of the patients history including those obtained from the referring provider were reviewed and summarized in detail.    HISTORY OF PRESENT ILLNESS:  The patient is a 64 y.o. year old female who is here for an opinion about the above issue.    History of Present Illness   This is a 64-year-old woman with COPD/chronic hypoxic respiratory failure on O2 2 L by nasal cannula, hyperlipidemia, hypertension, type 2 diabetes referred for adjuvant recommendations of breast cancer.  The patient had abnormal screening mammography on 9/11/2024 showing scattered fibroglandular densities and a focal asymmetry in the lateral right breast.  A follow-up diagnostic mammogram and ultrasound were performed 9/13/2024 showing at the 7 o'clock position of the right breast mass 1.1 x 1.1 x 0.8 cm in size.  An ultrasound biopsy was performed on 9/13/2024 showed invasive lobular carcinoma with pleomorphic features grade 2 (3+2+1) no lymph-vascular invasion or DCIS.  Tumor was negative for ER GA and HER2 1+ with Ki-67 5%.  Biopsy of the right axillary lymph node was negative.  Bilateral breast MRI 9/11/2024-scattered fibroglandular tissue, irregular mass inferior lateral right breast 7:00 11 cm from the nipple 1.0 x 1.0 x 0.8 cm and a questionable low right axillary lymph node.    She was taken to the operating room on 12/4/2024 and underwent a right lumpectomy sentinel lymph node procedure.  Pathology showed invasive lobular carcinoma grade 2 measuring 1.3 cm with margins negative for invasive carcinoma.  3 axillary lymph nodes were negative for invasive carcinoma.    The patient's sister and mother had breast cancer [employer] 9 gene genetic panel was performed and negative.    Past Medical History:   Diagnosis Date    Anemia     Anxiety      Arthritis     Asthma 10/01    Rsv    Breast cancer, right     Chronic hypoxic respiratory failure     2023, 2024 COVID AND RSV - FOLLOWED BY DR SAVAGE    COPD (chronic obstructive pulmonary disease)     Depression     Diabetes mellitus     Fissure, anal     GERD (gastroesophageal reflux disease)     Hemorrhoid     History of anemia     History of COVID-19     COVID PNEUMONIA  9/2023, 10/2024    Hyperlipidemia     Hypertension     On home O2     2L NC    Psoriasis     Pulmonary nodule     4 MM LEFT LOWER LOBE NONCALCIFIED PER CT REPORT    RSV infection 10/01/2023    ADMITTED TO [facility]    Sleep apnea     Urinary and fecal incontinence         Past Surgical History:   Procedure Laterality Date    BREAST LUMPECTOMY WITH SENTINEL NODE BIOPSY Right 12/04/2024    Procedure: right breast Kathrine guided lumpectomy with sentinel lymph node biopsy;  Surgeon: Shi Kaur MD;  Location: [facility];  Service: General;  Laterality: Right;    BREAST SURGERY Bilateral 12/04/2024    Procedure: RIGHT - ONCOPLASTIC RECONSTRUCTION LEFT - BREAST REDUCTION FOR SYMMETRY;  Surgeon: Flaquito Yoder MD;  Location: [facility];  Service: Plastics;  Laterality: Bilateral;    COLONOSCOPY N/A     HEMORRHOIDECTOMY  2018    HYSTERECTOMY N/A     OTHER SURGICAL HISTORY      LABIAL SURGERY TO REPLACE URETHRA D/T COMPLICATIOSN FROM PREVIOUS SURGERIES AND MESH    PELVIC FLOOR REPAIR N/A     RECTOCELE REPAIR N/A     TOTAL HIP ARTHROPLASTY Left 04/2023    TUBAL ABDOMINAL LIGATION      URETHRA SURGERY      X4        Current Outpatient Medications on File Prior to Visit   Medication Sig Dispense Refill    albuterol (PROVENTIL) (2.5 MG/3ML) 0.083% nebulizer solution Take 2.5 mg by nebulization 3 (Three) Times a Day.      albuterol sulfate HFA (Proventil HFA) 108 (90 Base) MCG/ACT inhaler Inhale 2 puffs Every 4 (Four) Hours As Needed for Wheezing. 8.5 g 1    Ascorbic Acid (Vitamin C) 500 MG capsule Take 500 mg by mouth Daily. HELD FOR OR       atorvastatin (LIPITOR) 40 MG tablet Take 1 tablet by mouth Daily.      B Complex Vitamins (vitamin b complex) capsule capsule Take 1 capsule by mouth Daily. HELD FOR OR      Cholecalciferol 25 MCG (1000 UT) tablet Take 1 tablet by mouth Daily. HELD FOR OR      Continuous Blood Gluc Sensor (Dexcom G6 Sensor)       Continuous Blood Gluc Transmit (Dexcom G6 Transmitter) misc       dexAMETHasone (DECADRON) 4 MG tablet Take 2 tablets oral twice a day for 3 consecutive days beginning the day before chemotherapy and continue for 6 doses. 12 tablet 3    glipizide (GLUCOTROL XL) 2.5 MG 24 hr tablet Take 1 tablet by mouth Daily.      Insulin Glargine (BASAGLAR KWIKPEN SC) Inject 60 Units under the skin into the appropriate area as directed Daily.      ipratropium-albuterol (DUO-NEB) 0.5-2.5 mg/3 ml nebulizer Take 3 mL by nebulization Every 4 (Four) Hours As Needed for Wheezing. 90 mL 1    lisinopril (PRINIVIL,ZESTRIL) 10 MG tablet Take 1 tablet by mouth Daily.      Loratadine (Claritin) 10 MG capsule Take  by mouth.      nicotine (NICODERM CQ) 21 MG/24HR patch Place 1 patch on the skin as directed by provider Daily.      ondansetron (ZOFRAN) 8 MG tablet Take 1 tablet by mouth 3 (Three) Times a Day As Needed for Nausea or Vomiting. 30 tablet 5    Trelegy Ellipta 100-62.5-25 MCG/ACT inhaler Inhale 1 puff Daily.      aspirin 81 MG EC tablet Take 1 tablet by mouth Daily. HELD FOR OR (Patient not taking: Reported on 1/8/2025)      furosemide (LASIX) 20 MG tablet Take 1 tablet by mouth Daily As Needed (swelling). (Patient not taking: Reported on 1/8/2025)      levocetirizine (XYZAL) 5 MG tablet Take 1 tablet by mouth Daily. (Patient not taking: Reported on 1/8/2025)       No current facility-administered medications on file prior to visit.        ALLERGIES:    Allergies   Allergen Reactions    Erythromycin Rash     ALL MYCINS BREAK HER OUT    Nitrofurantoin Headache and Rash     CAUSES FEVER, HEADACHES    Codeine Headache      headaches        Social History     Socioeconomic History    Marital status:     Number of children: 2   Tobacco Use    Smoking status: Former     Current packs/day: 0.00     Average packs/day: 1 pack/day for 49.7 years (49.7 ttl pk-yrs)     Types: Cigarettes     Start date: 1974     Quit date: 10/1/2023     Years since quittin.2     Passive exposure: Past    Smokeless tobacco: Never    Tobacco comments:     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   Vaping Use    Vaping status: Never Used   Substance and Sexual Activity    Alcohol use: Never    Drug use: Never    Sexual activity: Not Currently     Partners: Male     Birth control/protection: Post-menopausal, Hysterectomy        Family History   Problem Relation Age of Onset    Heart disease Mother     Hypertension Mother     Diabetes Mother     Cancer Mother         Bladder cancer Breast Cancer mastectomy    Breast cancer Mother     Asthma Mother     Dementia Mother     Hyperlipidemia Father     Cancer Father     Diabetes Father         [facility]  [facility]    Colon cancer Father     Lung cancer Father     Alcohol abuse Sister     Cancer Sister         breast, cancer double mastectomy    Cancer Paternal Aunt     Asthma Maternal Grandmother     Alcohol abuse Maternal Grandfather     Cancer Paternal Grandmother     Malig Hyperthermia Neg Hx         Review of Systems   Constitutional: Negative.    HENT: Negative.     Respiratory:  Positive for shortness of breath.    Cardiovascular: Negative.    Gastrointestinal: Negative.    Genitourinary: Negative.    Musculoskeletal: Negative.    Skin: Negative.  Positive for wound.   Neurological: Negative.    Hematological: Negative.    Psychiatric/Behavioral: Negative.          Objective     Vitals:    25 1027   BP: 160/75  Comment: On steroids   Pulse: 102   Resp: 16   Temp: 97.5 °F (36.4 °C)   TempSrc: Infrared   SpO2: 91%   Weight: 101 kg (222 lb 12.8 oz)   Height:  "177.8 cm (70\")   PainSc: 0-No pain           1/8/2025    10:43 AM   Current Status   ECOG score 0       Physical Exam    CONSTITUTIONAL: pleasant well-developed adult woman  HEENT: no icterus, no thrush, moist membranes  LYMPH: no cervical or supraclavicular lad  CV: RRR, S1S2, no murmur  RESP: cta bilat, no wheezing, no rales, 02 by NC  BREAST:  bilateral breast scars (reduction/reconstruction) healing well  GI: soft, nontender, no splenomegaly, +BS  MUSC: no edema, normal gait  NEURO: alert and oriented x3, normal strength  PSYCH: normal mood and affect    RECENT LABS:  Hematology WBC   Date Value Ref Range Status   01/08/2025 13.35 (H) 3.40 - 10.80 10*3/mm3 Final     RBC   Date Value Ref Range Status   01/08/2025 5.24 3.77 - 5.28 10*6/mm3 Final     Hemoglobin   Date Value Ref Range Status   01/08/2025 15.0 12.0 - 15.9 g/dL Final     Hematocrit   Date Value Ref Range Status   01/08/2025 47.3 (H) 34.0 - 46.6 % Final     Platelets   Date Value Ref Range Status   01/08/2025 357 140 - 450 10*3/mm3 Final        Lab Results   Component Value Date    GLUCOSE 324 (H) 01/08/2025    BUN 19 01/08/2025    CREATININE 0.82 01/08/2025     01/08/2025    K 4.2 01/08/2025    CL 96 (L) 01/08/2025    CALCIUM 9.9 01/08/2025    PROTEINTOT 6.9 01/08/2025    ALBUMIN 4.3 01/08/2025    ALT 9 01/08/2025    AST 14 01/08/2025    ALKPHOS 122 (H) 01/08/2025    BILITOT 0.4 01/08/2025    GLOB 2.6 01/08/2025    AGRATIO 1.7 01/08/2025    BCR 23.2 01/08/2025    ANIONGAP 13.7 01/08/2025    EGFR 80.5 01/08/2025     Breast MRI:IMPRESSION:   1. Suspicious right breast mass for which right diagnostic mammogram and ultrasound are recommended.   2.  Questionable prominent right axillary lymph node for which right diagnostic axillary ultrasound is recommended.   3.  No MRI evidence of left breast malignancy.     CT chest 4/8/2024-  IMPRESSION:  Impression:     1. Stable 4 mm noncalcified nodule within the left lower lobe. No other acute " cardiopulmonary disease. Repeat noncontrast CT scan of the chest would be recommended in 12 months to document continued stability.       Assessment & Plan   *[id number] lobular cancer of the right breast triple negative Ki-67 5%  abnormal screening mammography on 9/11/2024 showing scattered fibroglandular densities and a focal asymmetry in the lateral right breast  diagnostic mammogram and ultrasound were performed 9/13/2024 showing at the 7 o'clock position of the right breast mass 1.1 x 1.1 x 0.8 cm in size.    ultrasound biopsy was performed on 9/13/2024 showed invasive lobular carcinoma with pleomorphic features grade 2 (3+2+1) no lymph-vascular invasion or DCIS.  Tumor was negative for ER MI and HER2 1+ with Ki-67 5%.  Biopsy of the right axillary lymph node was negative  Bilateral breast MRI 9/11/2024-scattered fibroglandular tissue, irregular mass inferior lateral right breast 7:00 11 cm from the nipple 1.0 x 1.0 x 0.8 cm and a questionable low right axillary lymph node.  12/4/2024 - right lumpectomy sentinel lymph node procedure.  Pathology showed invasive lobular carcinoma grade 2 measuring 1.3 cm with margins negative for invasive carcinoma.  3 axillary lymph nodes were negative for invasive carcinoma.  [employer] 9 gene genetic panel was performed and negative.    *Lung nodule under surveillance by pulmonary medicine    *Comorbidities-COPD with chronic hypoxic respiratory failure on O2 2 L by nasal cannula, diabetes, hypertension, hyperlipidemia    Oncology plan/recommendations:   Proceed with TC chemotherapy 1/4 today with Neulasta  Weekly cbc RN review  Weekly PICC flush and dressing change  3 wk md/np visit lab and TC#2

## 2025-01-03 NOTE — PROGRESS NOTES
Radiation Oncology Consult Note    Name: Magda Nath  YOB: 1960  MRN #: [id number]  Date of Service: 1/3/2025  Referring Provider: Shi Kaur MD  [address] [place] [address]  Primary Care Provider: Francesca Adrian PA        DIAGNOSIS: [id number] lobular cancer of the right breast triple negative Ki-67 5%     CHIEF COMPLAINT: Shortness of breath                                 REQUESTING PHYSICIAN:  Shi Kaur Md  [address] [place] [address]    RECORDS OBTAINED:  Records of the patients history including those obtained from the referring provider were reviewed and summarized in detail.    HISTORY OF PRESENT ILLNESS:  Magda Nath is a 64 y.o. female's main complaint is that of chronic COPD.  She is on 2 L O2 per nasal cannula.  She continues to work full-time as a .  She presented with an abnormal mammogram 9/11/2024 which demonstrated a focal asymmetry in the lateral right breast.  She was evaluated with diagnostic mammogram and ultrasound demonstrating a 1.1 x 1.1 x 0.8 cm mass in the 7 o'clock position of the right breast.  She proceeded to have ultrasound-guided biopsy demonstrating invasive lobular carcinoma grade 2 with no lymphovascular invasion or DCIS.  Tumor was negative for ER and AK and HER2 1+ with Ki-67 of 5%.  FNA node biopsy right axillary lymph node was negative.  Patient proceeded to have bilateral breast MRI 9/11/2024 confirming the presence of irregular mass lateral right breast 7:00.  Patient underwent right lumpectomy with sentinel node biopsy demonstrating invasive lobular carcinoma grade 2 measuring 1.3 cm with margins negative for invasive carcinoma or DCIS.  3 axillary lymph nodes were negative for invasive carcinoma.    Patient was subsequently seen by Dr. Alexey Vallejo.  Patient was noted to have a sister and mother both with breast cancer.  Invitae 9 gene genetic panel was negative.  As patient had a  primary lesion in excess of 1 cm which was triple negative, she was at increased risk for residual microscopic disease and the plan is currently for 4 cycles of adjuvant TC x 4 cycles.  Chemo is scheduled to start 1/6/2025.  Patient is here today to discuss adjuvant radiotherapy following surgery and chemotherapy.        The following portions of the patient's history were reviewed and updated as appropriate: allergies, current medications, past family history, past medical history, past social history, past surgical history and problem list. Reviewed with the patient and remain unchanged.    PAST MEDICAL HISTORY:  she  has a past medical history of Anemia, Anxiety, Arthritis, Asthma (10/01), Breast cancer, right, Chronic hypoxic respiratory failure, COPD (chronic obstructive pulmonary disease), Depression, Diabetes mellitus, Fissure, anal, GERD (gastroesophageal reflux disease), Hemorrhoid, History of anemia, History of COVID-19, Hyperlipidemia, Hypertension, On home O2, Psoriasis, Pulmonary nodule, RSV infection (10/01/2023), Sleep apnea, and Urinary and fecal incontinence.  MEDICATIONS:   Current Outpatient Medications:   •  albuterol (PROVENTIL) (2.5 MG/3ML) 0.083% nebulizer solution, Take 2.5 mg by nebulization 3 (Three) Times a Day., Disp: , Rfl:   •  albuterol sulfate HFA (Proventil HFA) 108 (90 Base) MCG/ACT inhaler, Inhale 2 puffs Every 4 (Four) Hours As Needed for Wheezing., Disp: 8.5 g, Rfl: 1  •  Ascorbic Acid (Vitamin C) 500 MG capsule, Take 500 mg by mouth Daily. HELD FOR OR, Disp: , Rfl:   •  aspirin 81 MG EC tablet, Take 1 tablet by mouth Daily. HELD FOR OR, Disp: , Rfl:   •  atorvastatin (LIPITOR) 40 MG tablet, Take 1 tablet by mouth Daily., Disp: , Rfl:   •  B Complex Vitamins (vitamin b complex) capsule capsule, Take 1 capsule by mouth Daily. HELD FOR OR, Disp: , Rfl:   •  Cholecalciferol 25 MCG (1000 UT) tablet, Take 1 tablet by mouth Daily. HELD FOR OR, Disp: , Rfl:   •  Continuous Blood Gluc  Sensor (Dexcom G6 Sensor), , Disp: , Rfl:   •  Continuous Blood Gluc Transmit (Dexcom G6 Transmitter) misc, , Disp: , Rfl:   •  dexAMETHasone (DECADRON) 4 MG tablet, Take 2 tablets oral twice a day for 3 consecutive days beginning the day before chemotherapy and continue for 6 doses., Disp: 12 tablet, Rfl: 3  •  furosemide (LASIX) 20 MG tablet, Take 1 tablet by mouth Daily As Needed (swelling)., Disp: , Rfl:   •  glipizide (GLUCOTROL XL) 2.5 MG 24 hr tablet, Take 1 tablet by mouth Daily., Disp: , Rfl:   •  Insulin Glargine (BASAGLAR KWIKPEN SC), Inject 60 Units under the skin into the appropriate area as directed Daily., Disp: , Rfl:   •  ipratropium-albuterol (DUO-NEB) 0.5-2.5 mg/3 ml nebulizer, Take 3 mL by nebulization Every 4 (Four) Hours As Needed for Wheezing., Disp: 90 mL, Rfl: 1  •  levocetirizine (XYZAL) 5 MG tablet, Take 1 tablet by mouth Daily., Disp: , Rfl:   •  lisinopril (PRINIVIL,ZESTRIL) 10 MG tablet, Take 1 tablet by mouth Daily., Disp: , Rfl:   •  ondansetron (ZOFRAN) 8 MG tablet, Take 1 tablet by mouth 3 (Three) Times a Day As Needed for Nausea or Vomiting., Disp: 30 tablet, Rfl: 5  •  Trelegy Ellipta 100-62.5-25 MCG/ACT inhaler, Inhale 1 puff Daily., Disp: , Rfl:   ALLERGIES:   Allergies   Allergen Reactions   • Erythromycin Rash     ALL MYCINS BREAK HER OUT   • Nitrofurantoin Headache and Rash     CAUSES FEVER, HEADACHES   • Codeine Headache     headaches     PAST SURGICAL HISTORY: she has a past surgical history that includes Total hip arthroplasty (Left, 04/2023); RECTOCELE REPAIR (N/A); Pelvic floor repair (N/A); Hysterectomy (N/A); Urethra surgery; Colonoscopy (N/A); Hemorrhoid surgery (2018); Other surgical history; breast lumpectomy with sentinel node biopsy (Right, 12/04/2024); Breast surgery (Bilateral, 12/04/2024); and Tubal ligation.  PREVIOUS RADIOTHERAPY OR CHEMOTHERAPY: No  FAMILY HISTORY: her family history includes Alcohol abuse in her maternal grandfather and sister; Asthma in  her maternal grandmother and mother; Breast cancer in her mother; Cancer in her father, mother, paternal aunt, paternal grandmother, and sister; Colon cancer in her father; Dementia in her mother; Diabetes in her father and mother; Heart disease in her mother; Hyperlipidemia in her father; Hypertension in her mother; Lung cancer in her father.  SOCIAL HISTORY: she  reports that she quit smoking about 15 months ago. Her smoking use included cigarettes. She started smoking about 51 years ago. She has a 49.7 pack-year smoking history. She has been exposed to tobacco smoke. She has never used smokeless tobacco. She reports that she does not drink alcohol and does not use drugs.  PAIN AND PAIN MANAGEMENT:   Vitals:    01/03/25 1010   BP: 125/80   Pulse: 95   SpO2: 92%   Weight: 105 kg (230 lb 9.6 oz)   PainSc: 0-No pain     NUTRITIONAL STATUS:  Otherwise no issues  KPS: 90:  Minor signs or symptoms  ECOG: (1) Restricted in physically strenuous activity, ambulatory and able to do work of light nature       PHQ-9 Total Score: 0    Review of Systems:   Review of Systems   Constitutional: Negative.    HENT: Negative.     Eyes: Negative.    Respiratory:          Mr. Nath suffers from chronic COPD and typically is on 2 L O2 per nasal cannula.  She occasionally uses rescue inhaler   Cardiovascular: Negative.    Gastrointestinal:  Positive for rectal pain.   Endocrine: Negative.    Genitourinary:         Patient does report bladder incontinence and urinary frequency at night   Musculoskeletal:  Positive for arthralgias and neck pain.   Neurological: Negative.    Psychiatric/Behavioral:  Positive for sleep disturbance.         Objective     Vitals:  Vitals:    01/03/25 1010   BP: 125/80   Pulse: 95   SpO2: 92%   Weight: 105 kg (230 lb 9.6 oz)   PainSc: 0-No pain         PHYSICAL EXAM:  Physical Exam  Constitutional:       Appearance: Normal appearance.   HENT:      Head: Normocephalic.      Nose: Nose normal.       Mouth/Throat:      Mouth: Mucous membranes are moist.   Eyes:      Extraocular Movements: Extraocular movements intact.      Pupils: Pupils are equal, round, and reactive to light.   Cardiovascular:      Rate and Rhythm: Normal rate and regular rhythm.   Pulmonary:      Comments: Patient is mildly short of breath.  She is not on O2 currently.  She has considerable wheezing in the right base and midlung field less on the left.  Occasional crackles are also audible.  Lungs in general are tight.  She is instructed to use her albuterol inhaler when she gets back to her car.  We have also offered her supplemental oxygen as necessary while in the department.  Abdominal:      Palpations: Abdomen is soft.   Musculoskeletal:         General: Normal range of motion.      Cervical back: Normal range of motion.      Comments: Patient is status post bilateral reduction mammoplasty.  The involved right breast is carefully examined.  Patient has very small area of necrosis at the juncture of the lateral and vertical incisions in the mid inframammary fold.  This is healing appropriately.  There is no palpable axillary adenopathy.  Patient has very mild seroma on the right side no other palpable abnormalities within the breast parenchyma.  Left breast is examined as well.  There is no palpable axillary disease.  The breast itself is nontender.  Incisions healing nicely with again small area of necrosis at the juncture of incisions which is healing appropriately.  Abdomen is obese and nontender.   Skin:     General: Skin is warm.   Neurological:      General: No focal deficit present.      Mental Status: She is alert and oriented to person, place, and time.   Psychiatric:         Mood and Affect: Mood normal.         Behavior: Behavior normal.         Thought Content: Thought content normal.         Judgment: Judgment normal.        PERTINENT IMAGING/PATHOLOGY/LABS (Medical Decision Making):     COORDINATION OF CARE: A copy of this  note is sent to the referring provider.    PATHOLOGY (Reviewed):   SPECIMEN   Procedure  Excision (less than total mastectomy)   Specimen Laterality  Right   TUMOR   Tumor Site  Lower outer quadrant   Histologic Type  Invasive lobular carcinoma   Histologic Grade ([place] Histologic Score)     Glandular (Acinar) / Tubular Differentiation  Score 3   Nuclear Pleomorphism  Score 2   Mitotic Rate  Score 1   Overall Grade  Grade 2 (scores of 6 or 7)   Tumor Size  Greatest dimension of largest invasive focus (Millimeters): 13 mm   Tumor Focality  Single focus of invasive carcinoma   Ductal Carcinoma In Situ (DCIS)  Not identified   Lymphatic and / or Vascular Invasion  Not identified   Treatment Effect in the Breast  No known presurgical therapy   MARGINS   Margin Status for Invasive Carcinoma  All margins negative for invasive carcinoma   Distance from Invasive Carcinoma to Closest Margin  15 mm   Closest Margin(s) to Invasive Carcinoma  Posterior     Superior   REGIONAL LYMPH NODES   Regional Lymph Node Status  All regional lymph nodes negative for tumor   Total Number of Lymph Nodes Examined (sentinel and non-sentinel)  3   Number of Hoven Nodes Examined  3   pTNM CLASSIFICATION (AJCC 8th Edition)   Reporting of pT, pN, and (when applicable) pM categories is based on information available to the pathologist at the time the report is issued. As per the AJCC (Chapter 1, 8th Ed.) it is the managing physician's responsibility to establish the final pathologic stage based upon all pertinent information, including but potentially not limited to this pathology report.   pT Category  pT1c   pN Category  pN0   N Suffix  (sn)   SPECIAL STUDIES        Estrogen Receptor (ER) Status  Negative        Progesterone Receptor (PgR) Status  Negative        HER2 (by immunohistochemistry)  Negative (Score 1+)        Ki-67 Percentage of Positive Nuclei  5 %     IMAGING (Reviewed):     MRI BREAST BILATERAL DIAGNOSTIC W WO CONTRAST  9/11/24  IMPRESSION:   1. Suspicious right breast mass for which right diagnostic mammogram and ultrasound are recommended.   2.  Questionable prominent right axillary lymph node for which right diagnostic axillary ultrasound is recommended.   3.  No MRI evidence of left breast malignancy.     RECOMMENDATION:   Right diagnostic mammogram and ultrasound, right axillary ultrasound, and ultrasound-guided biopsy/biopsies as clinically indicated.       FINAL ASSESSMENT: BI-RADS Category 4, Suspicious abnormality.  Biopsy should be considered.     LABS (Reviewed):  Hematology WBC   Date Value Ref Range Status   12/18/2024 12.33 (H) 3.40 - 10.80 10*3/mm3 Final     RBC   Date Value Ref Range Status   12/18/2024 5.37 (H) 3.77 - 5.28 10*6/mm3 Final     Hemoglobin   Date Value Ref Range Status   12/18/2024 15.0 12.0 - 15.9 g/dL Final     Hematocrit   Date Value Ref Range Status   12/18/2024 49.2 (H) 34.0 - 46.6 % Final     Platelets   Date Value Ref Range Status   12/18/2024 344 140 - 450 10*3/mm3 Final      Chemistry   Lab Results   Component Value Date    GLUCOSE 210 (H) 12/18/2024    BUN 13 12/18/2024    CREATININE 0.57 12/18/2024    BCR 22.8 12/18/2024    K 4.2 12/18/2024    CO2 32.5 (H) 12/18/2024    CALCIUM 9.3 12/18/2024    ALBUMIN 3.9 12/18/2024    AST 16 12/18/2024    ALT 17 12/18/2024       Assessment & Plan     ASSESSMENT :  1. Right breast invasive lobular carcinoma: Grade II,  ER-/MO-, Her2-; [id number], Stage I.      PLAN:   1.  Proceed with adjuvant TC x 4 cycles to begin 1/6/2025  2.  Ms. Nath will require postoperative radiotherapy to the right breast.  This could will had widely negative margins of at least 15 mm and will also receive adjuvant chemotherapy.  As result, her I will offer her whole breast radiotherapy over 16 fractions without a boost.  Various side effects complications associated with postoperative radiotherapy are discussed in detail with Ms. Nath and she understands and accepts these  things.  We will plan to see her in approximately 3 months once she has completed adjuvant chemotherapy.  It is our pleasure to participate in her care.        I spent 60 minutes caring for Magda on this date of service. This time includes time spent by me in the following activities:preparing for the visit, reviewing tests, obtaining and/or reviewing a separately obtained history, performing a medically appropriate examination and/or evaluation , ordering medications, tests, or procedures, referring and communicating with other health care professionals , and independently interpreting results and communicating that information with the patient/family/caregiver       CC: Shi Kaur MD Harrod, Georgia P, PA Mark R. Jones, MD  1/3/2025  7:28 AM EST

## 2025-01-06 ENCOUNTER — HOSPITAL ENCOUNTER (OUTPATIENT)
Dept: INFUSION THERAPY | Facility: HOSPITAL | Age: 65
Discharge: HOME | End: 2025-01-06
Payer: COMMERCIAL

## 2025-01-06 DIAGNOSIS — Z45.2 ENCOUNTER FOR CENTRAL LINE PLACEMENT: Primary | ICD-10-CM

## 2025-01-07 ENCOUNTER — HOSPITAL ENCOUNTER (OUTPATIENT)
Dept: GENERAL RADIOLOGY | Facility: HOSPITAL | Age: 65
Discharge: HOME | End: 2025-01-07
Payer: COMMERCIAL

## 2025-01-07 ENCOUNTER — HOSPITAL ENCOUNTER (OUTPATIENT)
Dept: INFUSION THERAPY | Facility: HOSPITAL | Age: 65
Discharge: HOME | End: 2025-01-07
Payer: COMMERCIAL

## 2025-01-07 VITALS
TEMPERATURE: 98 F | RESPIRATION RATE: 18 BRPM | DIASTOLIC BLOOD PRESSURE: 73 MMHG | SYSTOLIC BLOOD PRESSURE: 124 MMHG | OXYGEN SATURATION: 95 % | HEART RATE: 89 BPM

## 2025-01-07 DIAGNOSIS — Z45.2 ENCOUNTER FOR CENTRAL LINE PLACEMENT: Primary | ICD-10-CM

## 2025-01-07 PROCEDURE — 71045 X-RAY EXAM CHEST 1 VIEW: CPT

## 2025-01-07 PROCEDURE — C1751 CATH, INF, PER/CENT/MIDLINE: HCPCS

## 2025-01-07 NOTE — PATIENT INSTRUCTIONS
"Call [facility] at [phone] if you have any problems or concerns.    We know you have a Choice in healthcare and appreciate you using [facility].  Our purpose is to provide you \"Excellent Care\".  We hope that you will always choose us in the future and continue to recommend us to your family and friends.     "

## 2025-01-07 NOTE — NURSING NOTE
Patient arrived to [facility] at 0837. History and medications reviewed. VSS. Single lumen PICC placed successfully. Patient tolerated well without complications. X-ray verified placement in the right atrium. RN called radiologist to verify it was in the correct spot. Dr. Ortiz verified PICC was in cavo-atrial junction and was good to use. AVS reviewed and sent home with patient. Patient discharged at 1315 in stable condition without complaints.

## 2025-01-08 ENCOUNTER — INFUSION (OUTPATIENT)
Dept: ONCOLOGY | Facility: HOSPITAL | Age: 65
End: 2025-01-08
Payer: COMMERCIAL

## 2025-01-08 ENCOUNTER — OFFICE VISIT (OUTPATIENT)
Dept: ONCOLOGY | Facility: CLINIC | Age: 65
End: 2025-01-08
Payer: COMMERCIAL

## 2025-01-08 ENCOUNTER — TELEPHONE (OUTPATIENT)
Dept: ONCOLOGY | Facility: CLINIC | Age: 65
End: 2025-01-08

## 2025-01-08 VITALS
SYSTOLIC BLOOD PRESSURE: 160 MMHG | WEIGHT: 222.8 LBS | TEMPERATURE: 97.5 F | BODY MASS INDEX: 31.9 KG/M2 | HEIGHT: 70 IN | OXYGEN SATURATION: 91 % | RESPIRATION RATE: 16 BRPM | DIASTOLIC BLOOD PRESSURE: 75 MMHG | HEART RATE: 102 BPM

## 2025-01-08 VITALS — DIASTOLIC BLOOD PRESSURE: 82 MMHG | HEART RATE: 75 BPM | SYSTOLIC BLOOD PRESSURE: 160 MMHG

## 2025-01-08 DIAGNOSIS — C50.919 MALIGNANT NEOPLASM OF FEMALE BREAST, UNSPECIFIED ESTROGEN RECEPTOR STATUS, UNSPECIFIED LATERALITY, UNSPECIFIED SITE OF BREAST: Primary | ICD-10-CM

## 2025-01-08 DIAGNOSIS — Z45.2 FITTING AND ADJUSTMENT OF VASCULAR CATHETER: Primary | ICD-10-CM

## 2025-01-08 DIAGNOSIS — C50.919 MALIGNANT NEOPLASM OF FEMALE BREAST, UNSPECIFIED ESTROGEN RECEPTOR STATUS, UNSPECIFIED LATERALITY, UNSPECIFIED SITE OF BREAST: ICD-10-CM

## 2025-01-08 LAB
ALBUMIN SERPL-MCNC: 4.3 G/DL (ref 3.5–5.2)
ALBUMIN/GLOB SERPL: 1.7 G/DL
ALP SERPL-CCNC: 122 U/L (ref 39–117)
ALT SERPL W P-5'-P-CCNC: 9 U/L (ref 1–33)
ANION GAP SERPL CALCULATED.3IONS-SCNC: 13.7 MMOL/L (ref 5–15)
AST SERPL-CCNC: 14 U/L (ref 1–32)
BASOPHILS # BLD AUTO: 0 10*3/MM3 (ref 0–0.2)
BASOPHILS NFR BLD AUTO: 0 % (ref 0–1.5)
BILIRUB SERPL-MCNC: 0.4 MG/DL (ref 0–1.2)
BUN SERPL-MCNC: 19 MG/DL (ref 8–23)
BUN/CREAT SERPL: 23.2 (ref 7–25)
CALCIUM SPEC-SCNC: 9.9 MG/DL (ref 8.6–10.5)
CHLORIDE SERPL-SCNC: 96 MMOL/L (ref 98–107)
CO2 SERPL-SCNC: 27.3 MMOL/L (ref 22–29)
CREAT SERPL-MCNC: 0.82 MG/DL (ref 0.57–1)
DEPRECATED RDW RBC AUTO: 46.4 FL (ref 37–54)
EGFRCR SERPLBLD CKD-EPI 2021: 80.5 ML/MIN/1.73
EOSINOPHIL # BLD AUTO: 0 10*3/MM3 (ref 0–0.4)
EOSINOPHIL NFR BLD AUTO: 0 % (ref 0.3–6.2)
ERYTHROCYTE [DISTWIDTH] IN BLOOD BY AUTOMATED COUNT: 13.9 % (ref 12.3–15.4)
GLOBULIN UR ELPH-MCNC: 2.6 GM/DL
GLUCOSE SERPL-MCNC: 324 MG/DL (ref 65–99)
HCT VFR BLD AUTO: 47.3 % (ref 34–46.6)
HGB BLD-MCNC: 15 G/DL (ref 12–15.9)
IMM GRANULOCYTES # BLD AUTO: 0.03 10*3/MM3 (ref 0–0.05)
IMM GRANULOCYTES NFR BLD AUTO: 0.2 % (ref 0–0.5)
LYMPHOCYTES # BLD AUTO: 1.67 10*3/MM3 (ref 0.7–3.1)
LYMPHOCYTES NFR BLD AUTO: 12.5 % (ref 19.6–45.3)
MCH RBC QN AUTO: 28.6 PG (ref 26.6–33)
MCHC RBC AUTO-ENTMCNC: 31.7 G/DL (ref 31.5–35.7)
MCV RBC AUTO: 90.3 FL (ref 79–97)
MONOCYTES # BLD AUTO: 0.64 10*3/MM3 (ref 0.1–0.9)
MONOCYTES NFR BLD AUTO: 4.8 % (ref 5–12)
NEUTROPHILS NFR BLD AUTO: 11.01 10*3/MM3 (ref 1.7–7)
NEUTROPHILS NFR BLD AUTO: 82.5 % (ref 42.7–76)
PLATELET # BLD AUTO: 357 10*3/MM3 (ref 140–450)
PMV BLD AUTO: 9.9 FL (ref 6–12)
POTASSIUM SERPL-SCNC: 4.2 MMOL/L (ref 3.5–5.2)
PROT SERPL-MCNC: 6.9 G/DL (ref 6–8.5)
RBC # BLD AUTO: 5.24 10*6/MM3 (ref 3.77–5.28)
SODIUM SERPL-SCNC: 137 MMOL/L (ref 136–145)
WBC NRBC COR # BLD AUTO: 13.35 10*3/MM3 (ref 3.4–10.8)

## 2025-01-08 PROCEDURE — 25810000003 SODIUM CHLORIDE 0.9 % SOLUTION: Performed by: INTERNAL MEDICINE

## 2025-01-08 PROCEDURE — 80053 COMPREHEN METABOLIC PANEL: CPT | Performed by: INTERNAL MEDICINE

## 2025-01-08 PROCEDURE — 25810000003 SODIUM CHLORIDE 0.9 % SOLUTION 250 ML FLEX CONT: Performed by: INTERNAL MEDICINE

## 2025-01-08 PROCEDURE — 96375 TX/PRO/DX INJ NEW DRUG ADDON: CPT

## 2025-01-08 PROCEDURE — 25010000002 DIPHENHYDRAMINE PER 50 MG: Performed by: INTERNAL MEDICINE

## 2025-01-08 PROCEDURE — 96413 CHEMO IV INFUSION 1 HR: CPT

## 2025-01-08 PROCEDURE — 25010000002 DOCETAXEL 20 MG/ML SOLUTION 1 ML VIAL: Performed by: INTERNAL MEDICINE

## 2025-01-08 PROCEDURE — 96367 TX/PROPH/DG ADDL SEQ IV INF: CPT

## 2025-01-08 PROCEDURE — 25010000002 CYCLOPHOSPHAMIDE 2 GM/10ML SOLUTION 10 ML VIAL: Performed by: INTERNAL MEDICINE

## 2025-01-08 PROCEDURE — 25010000002 DOCETAXEL 20 MG/ML SOLUTION 4 ML VIAL: Performed by: INTERNAL MEDICINE

## 2025-01-08 PROCEDURE — 96377 APPLICATON ON-BODY INJECTOR: CPT

## 2025-01-08 PROCEDURE — 96417 CHEMO IV INFUS EACH ADDL SEQ: CPT

## 2025-01-08 PROCEDURE — 25010000002 PALONOSETRON PER 25 MCG: Performed by: INTERNAL MEDICINE

## 2025-01-08 PROCEDURE — 25010000002 PEGFILGRASTIM 6 MG/0.6ML PREFILLED SYRINGE KIT: Performed by: INTERNAL MEDICINE

## 2025-01-08 PROCEDURE — 85025 COMPLETE CBC W/AUTO DIFF WBC: CPT | Performed by: INTERNAL MEDICINE

## 2025-01-08 RX ORDER — PALONOSETRON 0.05 MG/ML
0.25 INJECTION, SOLUTION INTRAVENOUS ONCE
Status: CANCELLED | OUTPATIENT
Start: 2025-01-08

## 2025-01-08 RX ORDER — DIPHENHYDRAMINE HYDROCHLORIDE 50 MG/ML
50 INJECTION INTRAMUSCULAR; INTRAVENOUS AS NEEDED
Status: CANCELLED | OUTPATIENT
Start: 2025-01-08

## 2025-01-08 RX ORDER — LORATADINE 10 MG/1
CAPSULE, LIQUID FILLED ORAL
COMMUNITY

## 2025-01-08 RX ORDER — SODIUM CHLORIDE 0.9 % (FLUSH) 0.9 %
10 SYRINGE (ML) INJECTION AS NEEDED
Status: CANCELLED | OUTPATIENT
Start: 2025-01-08

## 2025-01-08 RX ORDER — FAMOTIDINE 10 MG/ML
20 INJECTION, SOLUTION INTRAVENOUS ONCE
Status: COMPLETED | OUTPATIENT
Start: 2025-01-08 | End: 2025-01-08

## 2025-01-08 RX ORDER — SODIUM CHLORIDE 0.9 % (FLUSH) 0.9 %
10 SYRINGE (ML) INJECTION AS NEEDED
OUTPATIENT
Start: 2025-01-08

## 2025-01-08 RX ORDER — NICOTINE 21 MG/24HR
1 PATCH, TRANSDERMAL 24 HOURS TRANSDERMAL EVERY 24 HOURS
COMMUNITY

## 2025-01-08 RX ORDER — FAMOTIDINE 10 MG/ML
20 INJECTION, SOLUTION INTRAVENOUS ONCE
Status: CANCELLED | OUTPATIENT
Start: 2025-01-08

## 2025-01-08 RX ORDER — PALONOSETRON 0.05 MG/ML
0.25 INJECTION, SOLUTION INTRAVENOUS ONCE
Status: COMPLETED | OUTPATIENT
Start: 2025-01-08 | End: 2025-01-08

## 2025-01-08 RX ORDER — SODIUM CHLORIDE 0.9 % (FLUSH) 0.9 %
10 SYRINGE (ML) INJECTION AS NEEDED
Status: DISCONTINUED | OUTPATIENT
Start: 2025-01-08 | End: 2025-01-08 | Stop reason: HOSPADM

## 2025-01-08 RX ORDER — FAMOTIDINE 10 MG/ML
20 INJECTION, SOLUTION INTRAVENOUS AS NEEDED
Status: CANCELLED | OUTPATIENT
Start: 2025-01-08

## 2025-01-08 RX ORDER — SODIUM CHLORIDE 9 MG/ML
20 INJECTION, SOLUTION INTRAVENOUS ONCE
Status: COMPLETED | OUTPATIENT
Start: 2025-01-08 | End: 2025-01-08

## 2025-01-08 RX ORDER — HYDROCORTISONE SODIUM SUCCINATE 100 MG/2ML
100 INJECTION INTRAMUSCULAR; INTRAVENOUS AS NEEDED
Status: CANCELLED | OUTPATIENT
Start: 2025-01-08

## 2025-01-08 RX ORDER — SODIUM CHLORIDE 9 MG/ML
20 INJECTION, SOLUTION INTRAVENOUS ONCE
Status: CANCELLED | OUTPATIENT
Start: 2025-01-08

## 2025-01-08 RX ADMIN — PALONOSETRON HYDROCHLORIDE 0.25 MG: 0.25 INJECTION, SOLUTION INTRAVENOUS at 12:21

## 2025-01-08 RX ADMIN — SODIUM CHLORIDE 50 MG: 9 INJECTION, SOLUTION INTRAVENOUS at 11:59

## 2025-01-08 RX ADMIN — Medication 10 ML: at 15:10

## 2025-01-08 RX ADMIN — CYCLOPHOSPHAMIDE 1320 MG: 2 INJECTION INTRAVENOUS at 14:17

## 2025-01-08 RX ADMIN — PEGFILGRASTIM 6 MG: KIT SUBCUTANEOUS at 14:38

## 2025-01-08 RX ADMIN — SODIUM CHLORIDE 165 MG: 9 INJECTION, SOLUTION INTRAVENOUS at 12:56

## 2025-01-08 RX ADMIN — SODIUM CHLORIDE 20 ML/HR: 900 INJECTION, SOLUTION INTRAVENOUS at 11:58

## 2025-01-08 RX ADMIN — FAMOTIDINE 20 MG: 10 INJECTION INTRAVENOUS at 11:59

## 2025-01-08 NOTE — NURSING NOTE
Pt asking about disability status. [facility] faxed over disability forms on 12/24/24. Brook Soto with our office did not receive that fax.  Pt was given fax number  and Brook's extension in case patient has questions or concerns. . Pt upset that forms were not received by our office. She plans for fax forms herself today or tomorrow.

## 2025-01-08 NOTE — TELEPHONE ENCOUNTER
Caller: Magda Nath    Relationship: Self    Best call back number: [phone]     What is the best time to reach you: ASAP    Who are you requesting to speak with (clinical staff, provider,  specific staff member):  OR COLT OR SAIRA      What was the call regarding: PT SAYS PAPERWORK WAS FAXED -[phone] ON DEC.24, NEED TO VERIFY IT WAS RECEIVED AND GET THAT TO SAIRA, PLEASE CALL PT TODAY TO ADVISE IF THIS WAS RECEIVED AND THE STATUS OF THIS, SHE IS VERY CONCERNED THAT SHE WILL NOT GET PAID FOR THIS MONTH.

## 2025-01-11 ENCOUNTER — TELEPHONE (OUTPATIENT)
Dept: ONCOLOGY | Facility: CLINIC | Age: 65
End: 2025-01-11
Payer: COMMERCIAL

## 2025-01-11 NOTE — TELEPHONE ENCOUNTER
This APRN was paged by the patient for temperature of 99.4 over the past 14 hours. The patient states she feels fine other than some chills. Encouraged her to continue to rest and hydrate well. Advised calling back if temperature is 100.4 or higher. She verbalized understanding and had no further questions.

## 2025-01-13 ENCOUNTER — HOSPITAL ENCOUNTER (EMERGENCY)
Facility: HOSPITAL | Age: 65
Discharge: HOME | End: 2025-01-13
Attending: STUDENT IN AN ORGANIZED HEALTH CARE EDUCATION/TRAINING PROGRAM | Admitting: STUDENT IN AN ORGANIZED HEALTH CARE EDUCATION/TRAINING PROGRAM
Payer: COMMERCIAL

## 2025-01-13 VITALS
SYSTOLIC BLOOD PRESSURE: 127 MMHG | OXYGEN SATURATION: 97 % | DIASTOLIC BLOOD PRESSURE: 57 MMHG | RESPIRATION RATE: 18 BRPM | TEMPERATURE: 99.2 F | WEIGHT: 215 LBS | HEART RATE: 92 BPM | BODY MASS INDEX: 30.78 KG/M2 | HEIGHT: 70 IN

## 2025-01-13 DIAGNOSIS — J02.9 SORE THROAT: Primary | ICD-10-CM

## 2025-01-13 DIAGNOSIS — Z92.21 HISTORY OF CHEMOTHERAPY: ICD-10-CM

## 2025-01-13 LAB
ALBUMIN SERPL-MCNC: 3.7 G/DL (ref 3.5–5.2)
ALBUMIN/GLOB SERPL: 1.7 G/DL
ALP SERPL-CCNC: 132 U/L (ref 39–117)
ALT SERPL W P-5'-P-CCNC: 12 U/L (ref 1–33)
ANION GAP SERPL CALCULATED.3IONS-SCNC: 9.7 MMOL/L (ref 5–15)
AST SERPL-CCNC: 15 U/L (ref 1–32)
BASOPHILS # BLD AUTO: 0.02 10*3/MM3 (ref 0–0.2)
BASOPHILS NFR BLD AUTO: 0.3 % (ref 0–1.5)
BILIRUB SERPL-MCNC: 0.8 MG/DL (ref 0–1.2)
BUN SERPL-MCNC: 10 MG/DL (ref 8–23)
BUN/CREAT SERPL: 17.5 (ref 7–25)
CALCIUM SPEC-SCNC: 8.6 MG/DL (ref 8.6–10.5)
CHLORIDE SERPL-SCNC: 100 MMOL/L (ref 98–107)
CO2 SERPL-SCNC: 27.3 MMOL/L (ref 22–29)
CREAT SERPL-MCNC: 0.57 MG/DL (ref 0.57–1)
DEPRECATED RDW RBC AUTO: 44.9 FL (ref 37–54)
EGFRCR SERPLBLD CKD-EPI 2021: 102.3 ML/MIN/1.73
EOSINOPHIL # BLD AUTO: 0.68 10*3/MM3 (ref 0–0.4)
EOSINOPHIL NFR BLD AUTO: 8.5 % (ref 0.3–6.2)
ERYTHROCYTE [DISTWIDTH] IN BLOOD BY AUTOMATED COUNT: 13.7 % (ref 12.3–15.4)
FLUAV SUBTYP SPEC NAA+PROBE: NOT DETECTED
FLUBV RNA ISLT QL NAA+PROBE: NOT DETECTED
GLOBULIN UR ELPH-MCNC: 2.2 GM/DL
GLUCOSE SERPL-MCNC: 135 MG/DL (ref 65–99)
HCT VFR BLD AUTO: 44 % (ref 34–46.6)
HGB BLD-MCNC: 14.3 G/DL (ref 12–15.9)
IMM GRANULOCYTES # BLD AUTO: 0.08 10*3/MM3 (ref 0–0.05)
IMM GRANULOCYTES NFR BLD AUTO: 1 % (ref 0–0.5)
LYMPHOCYTES # BLD AUTO: 1.7 10*3/MM3 (ref 0.7–3.1)
LYMPHOCYTES NFR BLD AUTO: 21.3 % (ref 19.6–45.3)
MCH RBC QN AUTO: 28.8 PG (ref 26.6–33)
MCHC RBC AUTO-ENTMCNC: 32.5 G/DL (ref 31.5–35.7)
MCV RBC AUTO: 88.5 FL (ref 79–97)
MONOCYTES # BLD AUTO: 0.22 10*3/MM3 (ref 0.1–0.9)
MONOCYTES NFR BLD AUTO: 2.8 % (ref 5–12)
NEUTROPHILS NFR BLD AUTO: 5.29 10*3/MM3 (ref 1.7–7)
NEUTROPHILS NFR BLD AUTO: 66.1 % (ref 42.7–76)
NRBC BLD AUTO-RTO: 0 /100 WBC (ref 0–0.2)
PLAT MORPH BLD: NORMAL
PLATELET # BLD AUTO: 219 10*3/MM3 (ref 140–450)
PMV BLD AUTO: 10.8 FL (ref 6–12)
POTASSIUM SERPL-SCNC: 3.9 MMOL/L (ref 3.5–5.2)
PROT SERPL-MCNC: 5.9 G/DL (ref 6–8.5)
RBC # BLD AUTO: 4.97 10*6/MM3 (ref 3.77–5.28)
RBC MORPH BLD: NORMAL
S PYO AG THROAT QL: NEGATIVE
SARS-COV-2 RNA RESP QL NAA+PROBE: NOT DETECTED
SODIUM SERPL-SCNC: 137 MMOL/L (ref 136–145)
WBC MORPH BLD: NORMAL
WBC NRBC COR # BLD AUTO: 7.99 10*3/MM3 (ref 3.4–10.8)

## 2025-01-13 PROCEDURE — 36415 COLL VENOUS BLD VENIPUNCTURE: CPT

## 2025-01-13 PROCEDURE — 85007 BL SMEAR W/DIFF WBC COUNT: CPT | Performed by: STUDENT IN AN ORGANIZED HEALTH CARE EDUCATION/TRAINING PROGRAM

## 2025-01-13 PROCEDURE — 87880 STREP A ASSAY W/OPTIC: CPT | Performed by: STUDENT IN AN ORGANIZED HEALTH CARE EDUCATION/TRAINING PROGRAM

## 2025-01-13 PROCEDURE — 99283 EMERGENCY DEPT VISIT LOW MDM: CPT | Performed by: STUDENT IN AN ORGANIZED HEALTH CARE EDUCATION/TRAINING PROGRAM

## 2025-01-13 PROCEDURE — 87636 SARSCOV2 & INF A&B AMP PRB: CPT | Performed by: STUDENT IN AN ORGANIZED HEALTH CARE EDUCATION/TRAINING PROGRAM

## 2025-01-13 PROCEDURE — 80053 COMPREHEN METABOLIC PANEL: CPT | Performed by: STUDENT IN AN ORGANIZED HEALTH CARE EDUCATION/TRAINING PROGRAM

## 2025-01-13 PROCEDURE — 85025 COMPLETE CBC W/AUTO DIFF WBC: CPT | Performed by: STUDENT IN AN ORGANIZED HEALTH CARE EDUCATION/TRAINING PROGRAM

## 2025-01-13 PROCEDURE — 87081 CULTURE SCREEN ONLY: CPT | Performed by: STUDENT IN AN ORGANIZED HEALTH CARE EDUCATION/TRAINING PROGRAM

## 2025-01-13 NOTE — ED PROVIDER NOTES
Subjective   History of Present Illness  64-year-old female with past medical history of breast cancer started on chemotherapy January 8 of this year presenting with complaint of fever, sore throat, she has no difficulty with breathing no chest pain no diaphoresis she has intermittent fevers up to 100.4 Tmax.  Because she is on chemotherapy she was counseled to come to the emergency room if her fever was 100.4        Review of Systems    Past Medical History:   Diagnosis Date    Anemia     Anxiety     Arthritis     Asthma 10/01    Rsv    Breast cancer, right     Chronic hypoxic respiratory failure     2023, 2024 COVID AND RSV - FOLLOWED BY DR SAVAGE    COPD (chronic obstructive pulmonary disease)     Depression     Diabetes mellitus     Fissure, anal     GERD (gastroesophageal reflux disease)     Hemorrhoid     History of anemia     History of COVID-19     COVID PNEUMONIA  9/2023, 10/2024    Hyperlipidemia     Hypertension     On home O2     2L NC    Psoriasis     Pulmonary nodule     4 MM LEFT LOWER LOBE NONCALCIFIED PER CT REPORT    RSV infection 10/01/2023    ADMITTED TO [facility]    Sleep apnea     Urinary and fecal incontinence        Allergies   Allergen Reactions    Erythromycin Rash     ALL MYCINS BREAK HER OUT    Nitrofurantoin Headache and Rash     CAUSES FEVER, HEADACHES    Codeine Headache     headaches       Past Surgical History:   Procedure Laterality Date    BREAST LUMPECTOMY WITH SENTINEL NODE BIOPSY Right 12/04/2024    Procedure: right breast Kathrine guided lumpectomy with sentinel lymph node biopsy;  Surgeon: Shi Kaur MD;  Location: [facility];  Service: General;  Laterality: Right;    BREAST SURGERY Bilateral 12/04/2024    Procedure: RIGHT - ONCOPLASTIC RECONSTRUCTION LEFT - BREAST REDUCTION FOR SYMMETRY;  Surgeon: Flaquito Yoder MD;  Location: [facility];  Service: Plastics;  Laterality: Bilateral;    COLONOSCOPY N/A     HEMORRHOIDECTOMY  2018    HYSTERECTOMY N/A      OTHER SURGICAL HISTORY      LABIAL SURGERY TO REPLACE URETHRA D/T COMPLICATIOSN FROM PREVIOUS SURGERIES AND MESH    PELVIC FLOOR REPAIR N/A     RECTOCELE REPAIR N/A     TOTAL HIP ARTHROPLASTY Left 2023    TUBAL ABDOMINAL LIGATION      URETHRA SURGERY      X4       Family History   Problem Relation Age of Onset    Heart disease Mother     Hypertension Mother     Diabetes Mother     Cancer Mother         Bladder cancer Breast Cancer mastectomy    Breast cancer Mother     Asthma Mother     Dementia Mother     Hyperlipidemia Father     Cancer Father     Diabetes Father         [facility]    Colon cancer Father     Lung cancer Father     Alcohol abuse Sister     Cancer Sister         breast, cancer double mastectomy    Cancer Paternal Aunt     Asthma Maternal Grandmother     Alcohol abuse Maternal Grandfather     Cancer Paternal Grandmother     Malig Hyperthermia Neg Hx        Social History     Socioeconomic History    Marital status:     Number of children: 2   Tobacco Use    Smoking status: Former     Current packs/day: 0.00     Average packs/day: 1 pack/day for 49.7 years (49.7 ttl pk-yrs)     Types: Cigarettes     Start date: 1974     Quit date: 10/1/2023     Years since quittin.2     Passive exposure: Past    Smokeless tobacco: Never    Tobacco comments:     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 Exam conducted with a chaperone present.   Constitutional:       General: She is not in acute distress.     Appearance: Normal appearance. She is well-developed. She is not ill-appearing, toxic-appearing or diaphoretic.      Comments: Left PICC line in the left  basilic vein, otherwise well-appearing speaking in full sentences comfortable   HENT:      Head: Normocephalic and atraumatic.      Nose: Nose normal.      Mouth/Throat:      Mouth: Mucous membranes are moist. Oral lesions (Scattered pustules in the posterior oropharynx) present.      Pharynx: Uvula midline. Posterior oropharyngeal erythema present. No oropharyngeal exudate (No definitive tonsillar exudates).   Eyes:      Extraocular Movements: Extraocular movements intact.      Conjunctiva/sclera: Conjunctivae normal.      Pupils: Pupils are equal, round, and reactive to light.   Cardiovascular:      Rate and Rhythm: Normal rate and regular rhythm.   Pulmonary:      Effort: Pulmonary effort is normal. No respiratory distress.      Breath sounds: Normal breath sounds. No stridor. No wheezing, rhonchi or rales.   Abdominal:      General: Abdomen is flat. There is no distension.      Tenderness: There is no abdominal tenderness. There is no guarding or rebound.   Musculoskeletal:         General: No swelling, tenderness, deformity or signs of injury. Normal range of motion.      Cervical back: Normal range of motion.   Skin:     General: Skin is warm and dry.      Capillary Refill: Capillary refill takes less than 2 seconds.      Findings: No erythema or rash.   Neurological:      General: No focal deficit present.      Mental Status: She is alert and oriented to person, place, and time. Mental status is at baseline.      Cranial Nerves: No cranial nerve deficit.      Sensory: No sensory deficit.      Motor: No weakness.   Psychiatric:         Mood and Affect: Mood normal.         Procedures           ED Course  ED Course as of 01/13/25 1242   Mon Jan 13, 2025   1242 Neutrophils were normal, patient nonseptic appearing, counseled her on return precautions given that she is on chemotherapy however she is not neutropenic.  Given recent incidence of viral pharyngitis anticipate this is likely the cause and the pustules  are likely either herpangina or a rhinovirus/adeno infection [AK]      ED Course User Index  [AK] Gil Kuo MD                                                       Medical Decision Making  Patient here for evaluation of sore throat will get swabs for strep pharyngitis as well as influenza as these are common causes of sore throat at this time.  Unlikely that she is neutropenic as the last neutrophils were 11,000 but  this measurement was before she started chemotherapy    Amount and/or Complexity of Data Reviewed  Labs: ordered.        Final diagnoses:   Sore throat   History of chemotherapy       ED Disposition  ED Disposition       ED Disposition   Discharge    Condition   Stable    Comment                    [facility] EMERGENCY DEPARTMENT  [address] [facility] [address] [phone]  Go to   If symptoms worsen    Francesca Adrian, PA  [address] [facility] [address] [phone]    In 2 days           Medication List      No changes were made to your prescriptions during this visit.            Gil Kuo MD  01/13/25 8048

## 2025-01-13 NOTE — Clinical Note
[facility] EMERGENCY DEPARTMENT  [address]  [place] [address]  Phone: [phone]    Magda Nath was seen and treated in our emergency department on 1/13/2025.  She may return to work on 01/15/2025.         Thank you for choosing [facility].    Gil Kuo MD

## 2025-01-14 ENCOUNTER — INFUSION (OUTPATIENT)
Dept: ONCOLOGY | Facility: HOSPITAL | Age: 65
End: 2025-01-14
Payer: COMMERCIAL

## 2025-01-14 VITALS
SYSTOLIC BLOOD PRESSURE: 121 MMHG | RESPIRATION RATE: 20 BRPM | TEMPERATURE: 98 F | DIASTOLIC BLOOD PRESSURE: 64 MMHG | OXYGEN SATURATION: 96 % | HEART RATE: 90 BPM

## 2025-01-14 DIAGNOSIS — C50.919 MALIGNANT NEOPLASM OF FEMALE BREAST, UNSPECIFIED ESTROGEN RECEPTOR STATUS, UNSPECIFIED LATERALITY, UNSPECIFIED SITE OF BREAST: Primary | ICD-10-CM

## 2025-01-14 DIAGNOSIS — Z45.2 FITTING AND ADJUSTMENT OF VASCULAR CATHETER: ICD-10-CM

## 2025-01-14 PROCEDURE — G0463 HOSPITAL OUTPT CLINIC VISIT: HCPCS

## 2025-01-14 PROCEDURE — 96523 IRRIG DRUG DELIVERY DEVICE: CPT

## 2025-01-14 RX ORDER — SODIUM CHLORIDE 0.9 % (FLUSH) 0.9 %
10 SYRINGE (ML) INJECTION AS NEEDED
OUTPATIENT
Start: 2025-01-14

## 2025-01-14 RX ORDER — SODIUM CHLORIDE 0.9 % (FLUSH) 0.9 %
10 SYRINGE (ML) INJECTION AS NEEDED
Status: DISCONTINUED | OUTPATIENT
Start: 2025-01-14 | End: 2025-01-14 | Stop reason: HOSPADM

## 2025-01-14 RX ADMIN — Medication 10 ML: at 14:40

## 2025-01-14 NOTE — NURSING NOTE
Pt c/o mouth sores and tongue is white. Pain included and unable to eat or drink. Pt c/o diarrhea. Pt educated on laxative protocol. Notified Dr. Vallejo, no labs needed today as patient had labs in er yesterday, nystatin swish and swallow ordered.

## 2025-01-15 LAB — BACTERIA SPEC AEROBE CULT: NORMAL

## 2025-01-16 ENCOUNTER — HOSPITAL ENCOUNTER (OUTPATIENT)
Dept: OCCUPATIONAL THERAPY | Facility: HOSPITAL | Age: 65
Setting detail: THERAPIES SERIES
Discharge: HOME | End: 2025-01-16
Payer: COMMERCIAL

## 2025-01-16 DIAGNOSIS — C50.919 MALIGNANT NEOPLASM OF FEMALE BREAST, UNSPECIFIED ESTROGEN RECEPTOR STATUS, UNSPECIFIED LATERALITY, UNSPECIFIED SITE OF BREAST: Primary | ICD-10-CM

## 2025-01-16 PROCEDURE — 97165 OT EVAL LOW COMPLEX 30 MIN: CPT

## 2025-01-16 NOTE — THERAPY EVALUATION
Outpatient Occupational Therapy Lymphedema Initial Evaluation  [facility]     Patient Name: Magda Nath  : 1960  MRN: [id number]  Today's Date: 2025      Visit Date: 2025    Patient Active Problem List   Diagnosis    Gastroesophageal reflux disease without esophagitis    Type 2 diabetes mellitus without complication    Obstructive sleep apnea, adult    COPD exacerbation    Acute hypoxic respiratory failure    External hemorrhoids    Internal hemorrhoids    Malignant neoplasm of female breast    Fitting and adjustment of vascular catheter        Past Medical History:   Diagnosis Date    Anemia     Anxiety     Arthritis     Asthma 10/01    Rsv    Breast cancer, right     Chronic hypoxic respiratory failure     ,  COVID AND RSV - FOLLOWED BY DR SAVAGE    COPD (chronic obstructive pulmonary disease)     Depression     Diabetes mellitus     Fissure, anal     GERD (gastroesophageal reflux disease)     Hemorrhoid     History of anemia     History of COVID-19     COVID PNEUMONIA  2023, 10/2024    Hyperlipidemia     Hypertension     On home O2     2L NC    Psoriasis     Pulmonary nodule     4 MM LEFT LOWER LOBE NONCALCIFIED PER CT REPORT    RSV infection 10/01/2023    ADMITTED TO [facility]    Sleep apnea     Urinary and fecal incontinence         Past Surgical History:   Procedure Laterality Date    BREAST LUMPECTOMY WITH SENTINEL NODE BIOPSY Right 2024    Procedure: right breast Kathrine guided lumpectomy with sentinel lymph node biopsy;  Surgeon: Shi Kaur MD;  Location: [facility] OR;  Service: General;  Laterality: Right;    BREAST SURGERY Bilateral 2024    Procedure: RIGHT - ONCOPLASTIC RECONSTRUCTION LEFT - BREAST REDUCTION FOR SYMMETRY;  Surgeon: Flaquito Yoder MD;  Location: [facility] OR;  Service: Plastics;  Laterality: Bilateral;    COLONOSCOPY N/A     HEMORRHOIDECTOMY  2018    HYSTERECTOMY N/A     OTHER SURGICAL HISTORY      LABIAL SURGERY TO REPLACE  URETHRA D/T COMPLICATIOSN FROM PREVIOUS SURGERIES AND MESH    PELVIC FLOOR REPAIR N/A     RECTOCELE REPAIR N/A     TOTAL HIP ARTHROPLASTY Left 04/2023    TUBAL ABDOMINAL LIGATION      URETHRA SURGERY      X4         Visit Dx:     ICD-10-CM ICD-9-CM   1. Malignant neoplasm of female breast, unspecified estrogen receptor status, unspecified laterality, unspecified site of breast  C50.919 174.9        Patient History       Row Name 01/16/25 0800             History    Chief Complaint Joint swelling;Joint stiffness;Swelling;Problems breathing/shortness of breath  -JJ         Pain     Pain Location --  pt reports no co pain today  -JJ         Daily Activities    Primary Language English  -JJ      Are you able to read Yes  -JJ      Are you able to write Yes  -JJ      How does patient learn best? Listening;Reading  -JJ      Teaching needs identified Management of Condition  -JJ      Patient is concerned about/has problems with Flexibility;Reaching over head;Repetitive movements of the hand, arm, shoulder  -JJ      Does patient have problems with the following? None  -JJ      Barriers to learning None  -JJ      Pt Participated in POC and Goals Yes  -JJ         Safety    Are you being hurt, hit, or frightened by anyone at home or in your life? No  -JJ      Are you being neglected by a caregiver No  -JJ      Have you had any of the following issues with N/A  -JJ                User Key  (r) = Recorded By, (t) = Taken By, (c) = Cosigned By      Initials Name Provider Type    Elise Lew OTR Occupational Therapist                     Lymphedema       Row Name 01/16/25 0800             Subjective Pain    Able to rate subjective pain? yes  -JJ      Pre-Treatment Pain Level 0  -JJ      Post-Treatment Pain Level 0  -JJ         Subjective    Subjective Comments pt states no pain or edema symptoms in R UE  -JJ         Lymphedema Assessment    Lymphedema Classification RUE:;at risk/stage 0  -JJ      Lymphedema Cancer  Related Sx right;lumpectomy;sentinel node biopsy;reconstructive  12/4/2024  -JJ      Lymph Nodes Removed # 3  -JJ      Positive Lymph Nodes # 0  -JJ      Stage of Cancer Stage I  -JJ      Chemo Received yes  -JJ      Chemo Treatments #/Timeframe pt receiving chemo treatments over next 12 weeks per pt. has received one dose.  -JJ      Adverse Chemo Reactions/Complication pt with co mouth sores and states has decreased edema in B LEs that has been present since hip replacementr in 2023  -JJ      Radiation Therapy Received --  pt with consult for radiation. recommended to complete 16 fractions after completion of chemotherapy in early April  -JJ      Lymphedema Assessment Comments pt with a medical hx of COPD, on 2L O2 nc  -JJ         Posture/Observations    Posture- WNL Posture is WNL  -JJ         General ROM    GENERAL ROM COMMENTS B UE AROM WFL  -JJ         MMT (Manual Muscle Testing)    General MMT Comments B UE strength functional  -JJ         Skin Changes/Observations    Skin Observations Comment incisions well healed, small area of necrosis under R breast, noted by MD  -JJEYSON         Lymphedema Measurements    Measurement Type(s) Quick Girth  -JJ      Quick Girth Areas Upper extremities  -JJ         LUE Quick Girth (cm)    Mid upper arm 35 cm  -JJ      Elbow 27 cm  -JJ      Wrist crease 17 cm  -JJ      LUE Quick Girth Total 79  -JJ         Compression/Skin Care    Compression/Skin Care Comments recommended compression sleeve for R UE to be worn during radiation treatments or if lymphedema symptoms arise prior to treatments  -JJ         L-Dex Bioimpedence Screening    L-Dex Measurement Extremity RUE  -JJ      L-Dex Patient Position Standing  -JJ      L-Dex UE Dominate Side Right  -JJ      L-Dex UE At Risk Side Right  -JJ      L-Dex UE Pre Surgical Value No  -JJ      L-Dex UE Score -5.6  -                User Key  (r) = Recorded By, (t) = Taken By, (c) = Cosigned By      Initials Name Provider Type    DANIEL Torres  "AVNI Sarabia Occupational Therapist                            Therapy Education  Education Details: educated on \"dos and donts of lymphedema\" and monitoring for early symptoms  Given: Symptoms/condition management, Edema management  Program: New  How Provided: Verbal, Demonstration, Written  Provided to: Patient  Level of Understanding: Verbalized         OT Goals       Row Name 01/16/25 0800          OT Short Term Goals    STG Date to Achieve 02/27/25  -     STG 1 pt will verbalize understanding with \"dos and donts of lymphedema\" including monitoring for symptoms and skin care  -     STG 1 Progress New  -        Long Term Goals    LTG Date to Achieve 05/15/25  -     LTG 1 pt will verbalize independence and compliance with R UE compression garment to be worn concurrently with radiation treatments to prevent development of lymphedema symptoms  -     LTG 1 Progress New  -     LTG 2 pt will maintain bioimpedence score less than 6 to assist with decrease development of lymphedema symtoms and risk of wounds, infections and pain of R UE  -     LTG 2 Progress New  [facility]        Time Calculation    OT Goal Re-Cert Due Date 05/15/25  -               User Key  (r) = Recorded By, (t) = Taken By, (c) = Cosigned By      Initials Name Provider Type    Elise Lew OTR Occupational Therapist                     OT Assessment/Plan       Row Name 01/16/25 1241          OT Assessment    Functional Limitations Performance in work activities  -     Impairments Edema;Impaired lymphatic circulation  -     Assessment Comments pt presents to clinic for education on lymphedema including monitoring for symptoms and \"dos and donts of lymphedema\". pt with bioimpedence score fro R UE of -5.6 well within normal range. per pt and chart review pt is currently receiving chemotherapy and radiation treatments are to begin at the conclusion of chemo. recommended R UE compression sleeve to be worn concurrently with " radiation treatments. pt measured and provided with ordering information. pt verbalizes understanding.  -J     Please refer to paper survey for additional self-reported information Yes  -     OT Diagnosis lymphedema stage 0 at risk  -     OT Rehab Potential Excellent  -     Patient/caregiver participated in establishment of treatment plan and goals Yes  -     Patient would benefit from skilled therapy intervention Yes  -J        OT Plan    OT Frequency --  1-2 additional follow up visits  -     Predicted Duration of Therapy Intervention (OT) x 6 months  -     Planned CPT's? OT EVAL LOW COMPLEXITY: 74221;OT THER ACT EA 15 MIN: 46018KC;OT SELF CARE/MGMT/TRAIN 15 MIN: 28744;OT RE-EVAL: 00180  -     OT Plan Comments plan for follow up appointment at conclusion of radiation treatments or if symptoms were to arise  -               User Key  (r) = Recorded By, (t) = Taken By, (c) = Cosigned By      Initials Name Provider Type    Elise Lew, OTR Occupational Therapist                    Outcome Measure Options: Quick DASH  Quick DASH  Open a tight or new jar.: No Difficulty  Do heavy household chores (e.g., wash walls, wash floors): No Difficulty  Carry a shopping bag or briefcase: No Difficulty  Wash your back: No Difficulty  Use a knife to cut food: No Difficulty  Recreational activities in which you take some force or impact through your arm, should or hand (e.g. golf, hammering, tennis, etc.): No Difficulty  During the past week, to what extent has your arm, shoulder, or hand problem interfered with your normal social activites with family, friends, neighbors or groups?: Not at all  During the past week, were you limited in your work or other regular daily activities as a result of your arm, shoulder or hand problem?: Not limited at all  Arm, Shoulder, or hand pain: None  Tingling (pins and needles) in your arm, shoulder, or hand: None  During the past week, how much difficulty have you  had sleeping because of the pain in your arm, shoulder or hand?: No difficulty  Number of Questions Answered: 11  Quick DASH Score: 0  Work Module (Optional)  Using your usual technique for your work?: Unable  Doing your usual work because of arm, shoulder or hand pain?: Unable  Doing your work as well as you would like?: Unable  Spending your usual amount of time doing your work?: Unable  Work Module Score: 100         Time Calculation:   OT Start Time: 0800  OT Stop Time: 0845  OT Time Calculation (min): 45 min     Therapy Charges for Today       Code Description Service Date Service Provider Modifiers Qty    [id number]  OT EVAL LOW COMPLEXITY 3 1/16/2025 Elise Torres, OTR GO 1                      AVNI Arzola  1/16/2025

## 2025-01-20 ENCOUNTER — TELEPHONE (OUTPATIENT)
Dept: SURGERY | Facility: CLINIC | Age: 65
End: 2025-01-20
Payer: COMMERCIAL

## 2025-01-20 DIAGNOSIS — C50.919 MALIGNANT NEOPLASM OF FEMALE BREAST, UNSPECIFIED ESTROGEN RECEPTOR STATUS, UNSPECIFIED LATERALITY, UNSPECIFIED SITE OF BREAST: Primary | ICD-10-CM

## 2025-01-20 NOTE — TELEPHONE ENCOUNTER
Patient wants to see if you can  make referral for radiation therapy at [facility].  She has had initial consult with Washington Juarez here at [facility] but states she can save a lot of driving time due to her location by seeing someone and receiving therapy at [place].  Please advise.  She is aware that she might have to have another initial consult with whomever you make the new referral to.  Thanks,  Brenda

## 2025-01-20 NOTE — TELEPHONE ENCOUNTER
Called patient and left voice message stating Dr. Kaur could make a referral to [facility] as [place] due to staffing issues does not have radiation services at this time.  Asked patient to call us back on surgery scheduling line to let us know if she wants to proceed with referral to [facility].

## 2025-01-20 NOTE — TELEPHONE ENCOUNTER
Patient called back and said [facility] would be acceptable.  Please send referral for radiation therapy to that facility.  Thanks,  Brenda

## 2025-01-21 ENCOUNTER — INFUSION (OUTPATIENT)
Dept: ONCOLOGY | Facility: HOSPITAL | Age: 65
End: 2025-01-21
Payer: COMMERCIAL

## 2025-01-21 DIAGNOSIS — Z45.2 FITTING AND ADJUSTMENT OF VASCULAR CATHETER: ICD-10-CM

## 2025-01-21 DIAGNOSIS — C50.919 MALIGNANT NEOPLASM OF FEMALE BREAST, UNSPECIFIED ESTROGEN RECEPTOR STATUS, UNSPECIFIED LATERALITY, UNSPECIFIED SITE OF BREAST: Primary | ICD-10-CM

## 2025-01-21 LAB
BASOPHILS # BLD AUTO: 0.05 10*3/MM3 (ref 0–0.2)
BASOPHILS NFR BLD AUTO: 0.3 % (ref 0–1.5)
DEPRECATED RDW RBC AUTO: 46.5 FL (ref 37–54)
EOSINOPHIL # BLD AUTO: 0.1 10*3/MM3 (ref 0–0.4)
EOSINOPHIL NFR BLD AUTO: 0.7 % (ref 0.3–6.2)
ERYTHROCYTE [DISTWIDTH] IN BLOOD BY AUTOMATED COUNT: 13.8 % (ref 12.3–15.4)
HCT VFR BLD AUTO: 44.9 % (ref 34–46.6)
HGB BLD-MCNC: 13.9 G/DL (ref 12–15.9)
IMM GRANULOCYTES # BLD AUTO: 0.54 10*3/MM3 (ref 0–0.05)
IMM GRANULOCYTES NFR BLD AUTO: 3.7 % (ref 0–0.5)
LYMPHOCYTES # BLD AUTO: 2.77 10*3/MM3 (ref 0.7–3.1)
LYMPHOCYTES NFR BLD AUTO: 18.9 % (ref 19.6–45.3)
MCH RBC QN AUTO: 28.3 PG (ref 26.6–33)
MCHC RBC AUTO-ENTMCNC: 31 G/DL (ref 31.5–35.7)
MCV RBC AUTO: 91.3 FL (ref 79–97)
MONOCYTES # BLD AUTO: 0.85 10*3/MM3 (ref 0.1–0.9)
MONOCYTES NFR BLD AUTO: 5.8 % (ref 5–12)
NEUTROPHILS NFR BLD AUTO: 10.34 10*3/MM3 (ref 1.7–7)
NEUTROPHILS NFR BLD AUTO: 70.6 % (ref 42.7–76)
PLATELET # BLD AUTO: 269 10*3/MM3 (ref 140–450)
PMV BLD AUTO: 9.8 FL (ref 6–12)
RBC # BLD AUTO: 4.92 10*6/MM3 (ref 3.77–5.28)
WBC NRBC COR # BLD AUTO: 14.65 10*3/MM3 (ref 3.4–10.8)

## 2025-01-21 PROCEDURE — 85025 COMPLETE CBC W/AUTO DIFF WBC: CPT | Performed by: INTERNAL MEDICINE

## 2025-01-21 PROCEDURE — 36592 COLLECT BLOOD FROM PICC: CPT

## 2025-01-21 RX ORDER — SODIUM CHLORIDE 0.9 % (FLUSH) 0.9 %
10 SYRINGE (ML) INJECTION AS NEEDED
Status: DISCONTINUED | OUTPATIENT
Start: 2025-01-21 | End: 2025-01-21 | Stop reason: HOSPADM

## 2025-01-21 RX ORDER — SODIUM CHLORIDE 0.9 % (FLUSH) 0.9 %
10 SYRINGE (ML) INJECTION AS NEEDED
OUTPATIENT
Start: 2025-01-21

## 2025-01-21 RX ADMIN — Medication 10 ML: at 15:21

## 2025-01-21 NOTE — NURSING NOTE
Pt here one day early for dressing change.  She looked at her calandar wrong.  PICC line dressing change performed without issue.  Blood drawn from PICC.  CBC wnl.  Pt reports mouth tenderness/thrush has resolved.  She is using saline nasal spray/ointment for her dry nasal passages.  She reports inability to rest at night or relax with taking steroids pretreatment.  Per Dr Vallejo, pt may decrease to 4 mg twice a day for 6 doses rather that 8 mg originally ordered.   She may resume her home dose of  lasix that she has held for chronic swelling in right lower extremity.  She may take an antianxiety medication if her PCP chooses to prescribe for her if the decrease in the dexamethasone does not help.  Pt V/U

## 2025-01-27 ENCOUNTER — TELEPHONE (OUTPATIENT)
Dept: OTHER | Facility: HOSPITAL | Age: 65
End: 2025-01-27
Payer: COMMERCIAL

## 2025-01-27 NOTE — TELEPHONE ENCOUNTER
Oncology Social Work  Social work referral was submitted for assistance with disability paperwork.  OSW spoke to the patient who states that she has completed the current necessary paperwork.  She stated that what ended up happening was the paperwork was uploaded to My Chart instead of being returned to [facility].  The patient stated that has since been corrected and she identified no additional supportive oncology needs.  The patient was mailed a letter listing available supportive oncology services, OSW contact information and information about [employer] and [employer].  The patient was encouraged to contact [facility] for any supportive needs that may arise.  Raymundo Joshi CSW

## 2025-01-28 ENCOUNTER — INFUSION (OUTPATIENT)
Dept: ONCOLOGY | Facility: HOSPITAL | Age: 65
End: 2025-01-28
Payer: COMMERCIAL

## 2025-01-28 DIAGNOSIS — C50.919 MALIGNANT NEOPLASM OF FEMALE BREAST, UNSPECIFIED ESTROGEN RECEPTOR STATUS, UNSPECIFIED LATERALITY, UNSPECIFIED SITE OF BREAST: ICD-10-CM

## 2025-01-28 DIAGNOSIS — Z45.2 FITTING AND ADJUSTMENT OF VASCULAR CATHETER: Primary | ICD-10-CM

## 2025-01-28 LAB
ALBUMIN SERPL-MCNC: 4.1 G/DL (ref 3.5–5.2)
ALBUMIN/GLOB SERPL: 1.6 G/DL
ALP SERPL-CCNC: 105 U/L (ref 39–117)
ALT SERPL W P-5'-P-CCNC: 22 U/L (ref 1–33)
ANION GAP SERPL CALCULATED.3IONS-SCNC: 9.7 MMOL/L (ref 5–15)
AST SERPL-CCNC: 21 U/L (ref 1–32)
BASOPHILS # BLD AUTO: 0.07 10*3/MM3 (ref 0–0.2)
BASOPHILS NFR BLD AUTO: 0.5 % (ref 0–1.5)
BILIRUB SERPL-MCNC: 0.4 MG/DL (ref 0–1.2)
BUN SERPL-MCNC: 15 MG/DL (ref 8–23)
BUN/CREAT SERPL: 23.4 (ref 7–25)
CALCIUM SPEC-SCNC: 9.7 MG/DL (ref 8.6–10.5)
CHLORIDE SERPL-SCNC: 99 MMOL/L (ref 98–107)
CO2 SERPL-SCNC: 25.3 MMOL/L (ref 22–29)
CREAT SERPL-MCNC: 0.64 MG/DL (ref 0.57–1)
DEPRECATED RDW RBC AUTO: 46.8 FL (ref 37–54)
EGFRCR SERPLBLD CKD-EPI 2021: 99.4 ML/MIN/1.73
EOSINOPHIL # BLD AUTO: 0.01 10*3/MM3 (ref 0–0.4)
EOSINOPHIL NFR BLD AUTO: 0.1 % (ref 0.3–6.2)
ERYTHROCYTE [DISTWIDTH] IN BLOOD BY AUTOMATED COUNT: 14.7 % (ref 12.3–15.4)
GLOBULIN UR ELPH-MCNC: 2.5 GM/DL
GLUCOSE SERPL-MCNC: 196 MG/DL (ref 65–99)
HCT VFR BLD AUTO: 44.9 % (ref 34–46.6)
HGB BLD-MCNC: 14.4 G/DL (ref 12–15.9)
IMM GRANULOCYTES # BLD AUTO: 0.06 10*3/MM3 (ref 0–0.05)
IMM GRANULOCYTES NFR BLD AUTO: 0.4 % (ref 0–0.5)
LYMPHOCYTES # BLD AUTO: 0.89 10*3/MM3 (ref 0.7–3.1)
LYMPHOCYTES NFR BLD AUTO: 6 % (ref 19.6–45.3)
MCH RBC QN AUTO: 28.7 PG (ref 26.6–33)
MCHC RBC AUTO-ENTMCNC: 32.1 G/DL (ref 31.5–35.7)
MCV RBC AUTO: 89.6 FL (ref 79–97)
MONOCYTES # BLD AUTO: 0.24 10*3/MM3 (ref 0.1–0.9)
MONOCYTES NFR BLD AUTO: 1.6 % (ref 5–12)
NEUTROPHILS NFR BLD AUTO: 13.55 10*3/MM3 (ref 1.7–7)
NEUTROPHILS NFR BLD AUTO: 91.4 % (ref 42.7–76)
NRBC BLD AUTO-RTO: 0 /100 WBC (ref 0–0.2)
PLATELET # BLD AUTO: 377 10*3/MM3 (ref 140–450)
PMV BLD AUTO: 10 FL (ref 6–12)
POTASSIUM SERPL-SCNC: 4.6 MMOL/L (ref 3.5–5.2)
PROT SERPL-MCNC: 6.6 G/DL (ref 6–8.5)
RBC # BLD AUTO: 5.01 10*6/MM3 (ref 3.77–5.28)
SODIUM SERPL-SCNC: 134 MMOL/L (ref 136–145)
WBC NRBC COR # BLD AUTO: 14.82 10*3/MM3 (ref 3.4–10.8)

## 2025-01-28 PROCEDURE — 80053 COMPREHEN METABOLIC PANEL: CPT | Performed by: INTERNAL MEDICINE

## 2025-01-28 PROCEDURE — 85025 COMPLETE CBC W/AUTO DIFF WBC: CPT | Performed by: INTERNAL MEDICINE

## 2025-01-28 PROCEDURE — 36591 DRAW BLOOD OFF VENOUS DEVICE: CPT

## 2025-01-28 RX ORDER — SODIUM CHLORIDE 0.9 % (FLUSH) 0.9 %
10 SYRINGE (ML) INJECTION AS NEEDED
Status: DISCONTINUED | OUTPATIENT
Start: 2025-01-28 | End: 2025-01-28 | Stop reason: HOSPADM

## 2025-01-28 RX ORDER — SODIUM CHLORIDE 0.9 % (FLUSH) 0.9 %
10 SYRINGE (ML) INJECTION AS NEEDED
Status: CANCELLED | OUTPATIENT
Start: 2025-01-28

## 2025-01-28 RX ADMIN — Medication 10 ML: at 15:20

## 2025-01-28 NOTE — NURSING NOTE
Pt reports feeling better and is on atarax and new insulin medication and is able to take steroids. Will verify new medications when returns tomorrow for treatment.

## 2025-01-29 ENCOUNTER — INFUSION (OUTPATIENT)
Dept: ONCOLOGY | Facility: HOSPITAL | Age: 65
End: 2025-01-29
Payer: COMMERCIAL

## 2025-01-29 ENCOUNTER — OFFICE VISIT (OUTPATIENT)
Dept: ONCOLOGY | Facility: CLINIC | Age: 65
End: 2025-01-29
Payer: COMMERCIAL

## 2025-01-29 ENCOUNTER — APPOINTMENT (OUTPATIENT)
Dept: ONCOLOGY | Facility: HOSPITAL | Age: 65
End: 2025-01-29
Payer: COMMERCIAL

## 2025-01-29 VITALS
HEART RATE: 103 BPM | OXYGEN SATURATION: 93 % | SYSTOLIC BLOOD PRESSURE: 114 MMHG | HEIGHT: 70 IN | TEMPERATURE: 99.6 F | WEIGHT: 220 LBS | RESPIRATION RATE: 16 BRPM | DIASTOLIC BLOOD PRESSURE: 65 MMHG | BODY MASS INDEX: 31.5 KG/M2

## 2025-01-29 DIAGNOSIS — C50.919 MALIGNANT NEOPLASM OF FEMALE BREAST, UNSPECIFIED ESTROGEN RECEPTOR STATUS, UNSPECIFIED LATERALITY, UNSPECIFIED SITE OF BREAST: ICD-10-CM

## 2025-01-29 DIAGNOSIS — C50.919 MALIGNANT NEOPLASM OF FEMALE BREAST, UNSPECIFIED ESTROGEN RECEPTOR STATUS, UNSPECIFIED LATERALITY, UNSPECIFIED SITE OF BREAST: Primary | ICD-10-CM

## 2025-01-29 DIAGNOSIS — J01.00 ACUTE NON-RECURRENT MAXILLARY SINUSITIS: ICD-10-CM

## 2025-01-29 DIAGNOSIS — Z45.2 FITTING AND ADJUSTMENT OF VASCULAR CATHETER: Primary | ICD-10-CM

## 2025-01-29 DIAGNOSIS — Z79.899 HIGH RISK MEDICATION USE: ICD-10-CM

## 2025-01-29 PROCEDURE — 96377 APPLICATON ON-BODY INJECTOR: CPT

## 2025-01-29 PROCEDURE — 25810000003 SODIUM CHLORIDE 0.9 % SOLUTION 250 ML FLEX CONT: Performed by: NURSE PRACTITIONER

## 2025-01-29 PROCEDURE — 96417 CHEMO IV INFUS EACH ADDL SEQ: CPT

## 2025-01-29 PROCEDURE — 96375 TX/PRO/DX INJ NEW DRUG ADDON: CPT

## 2025-01-29 PROCEDURE — 25010000002 DOCETAXEL 20 MG/ML SOLUTION 1 ML VIAL: Performed by: NURSE PRACTITIONER

## 2025-01-29 PROCEDURE — 25010000002 CYCLOPHOSPHAMIDE 1 GM/5ML SOLUTION 5 ML VIAL: Performed by: NURSE PRACTITIONER

## 2025-01-29 PROCEDURE — 25010000002 PEGFILGRASTIM 6 MG/0.6ML PREFILLED SYRINGE KIT: Performed by: NURSE PRACTITIONER

## 2025-01-29 PROCEDURE — 63710000001 DIPHENHYDRAMINE PER 50 MG: Performed by: NURSE PRACTITIONER

## 2025-01-29 PROCEDURE — 25010000002 PALONOSETRON 0.25 MG/5ML SOLUTION PREFILLED SYRINGE: Performed by: NURSE PRACTITIONER

## 2025-01-29 PROCEDURE — 96413 CHEMO IV INFUSION 1 HR: CPT

## 2025-01-29 PROCEDURE — 25010000002 CYCLOPHOSPHAMIDE 500 MG/2.5ML SOLUTION 2.5 ML VIAL: Performed by: NURSE PRACTITIONER

## 2025-01-29 PROCEDURE — 25010000002 DOCETAXEL 20 MG/ML SOLUTION 8 ML VIAL: Performed by: NURSE PRACTITIONER

## 2025-01-29 RX ORDER — SODIUM CHLORIDE 0.9 % (FLUSH) 0.9 %
10 SYRINGE (ML) INJECTION AS NEEDED
Status: DISCONTINUED | OUTPATIENT
Start: 2025-01-29 | End: 2025-01-29 | Stop reason: HOSPADM

## 2025-01-29 RX ORDER — PALONOSETRON 0.05 MG/ML
0.25 INJECTION, SOLUTION INTRAVENOUS ONCE
Status: CANCELLED | OUTPATIENT
Start: 2025-01-29

## 2025-01-29 RX ORDER — DIPHENHYDRAMINE HCL 25 MG
25 CAPSULE ORAL ONCE
Status: COMPLETED | OUTPATIENT
Start: 2025-01-29 | End: 2025-01-29

## 2025-01-29 RX ORDER — SODIUM CHLORIDE 9 MG/ML
20 INJECTION, SOLUTION INTRAVENOUS ONCE
Status: CANCELLED | OUTPATIENT
Start: 2025-01-29

## 2025-01-29 RX ORDER — SODIUM CHLORIDE 9 MG/ML
20 INJECTION, SOLUTION INTRAVENOUS ONCE
Status: COMPLETED | OUTPATIENT
Start: 2025-01-29 | End: 2025-01-29

## 2025-01-29 RX ORDER — FAMOTIDINE 10 MG/ML
20 INJECTION, SOLUTION INTRAVENOUS ONCE
Status: CANCELLED | OUTPATIENT
Start: 2025-01-29

## 2025-01-29 RX ORDER — INSULIN LISPRO 100 [IU]/ML
4-16 INJECTION, SOLUTION INTRAVENOUS; SUBCUTANEOUS AS NEEDED
COMMUNITY

## 2025-01-29 RX ORDER — DIPHENHYDRAMINE HYDROCHLORIDE 50 MG/ML
50 INJECTION INTRAMUSCULAR; INTRAVENOUS AS NEEDED
Status: CANCELLED | OUTPATIENT
Start: 2025-01-29

## 2025-01-29 RX ORDER — SODIUM CHLORIDE 0.9 % (FLUSH) 0.9 %
10 SYRINGE (ML) INJECTION AS NEEDED
OUTPATIENT
Start: 2025-01-29

## 2025-01-29 RX ORDER — SODIUM CHLORIDE 9 MG/ML
INJECTION, SOLUTION INTRAVENOUS
Status: COMPLETED
Start: 2025-01-29 | End: 2025-01-29

## 2025-01-29 RX ORDER — HYDROCORTISONE SODIUM SUCCINATE 100 MG/2ML
100 INJECTION INTRAMUSCULAR; INTRAVENOUS AS NEEDED
Status: CANCELLED | OUTPATIENT
Start: 2025-01-29

## 2025-01-29 RX ORDER — CEFDINIR 300 MG/1
300 CAPSULE ORAL 2 TIMES DAILY
Qty: 14 CAPSULE | Refills: 0 | Status: SHIPPED | OUTPATIENT
Start: 2025-01-29

## 2025-01-29 RX ORDER — HYDROXYZINE PAMOATE 25 MG/1
25 CAPSULE ORAL 3 TIMES DAILY PRN
COMMUNITY

## 2025-01-29 RX ORDER — PALONOSETRON 0.05 MG/ML
0.25 INJECTION, SOLUTION INTRAVENOUS ONCE
Status: COMPLETED | OUTPATIENT
Start: 2025-01-29 | End: 2025-01-29

## 2025-01-29 RX ORDER — FAMOTIDINE 10 MG/ML
20 INJECTION, SOLUTION INTRAVENOUS AS NEEDED
Status: CANCELLED | OUTPATIENT
Start: 2025-01-29

## 2025-01-29 RX ORDER — DIPHENHYDRAMINE HCL 25 MG
25 CAPSULE ORAL ONCE
Status: CANCELLED | OUTPATIENT
Start: 2025-01-29

## 2025-01-29 RX ORDER — FAMOTIDINE 10 MG/ML
20 INJECTION, SOLUTION INTRAVENOUS ONCE
Status: COMPLETED | OUTPATIENT
Start: 2025-01-29 | End: 2025-01-29

## 2025-01-29 RX ADMIN — SODIUM CHLORIDE 20 ML/HR: 9 INJECTION, SOLUTION INTRAVENOUS at 12:05

## 2025-01-29 RX ADMIN — PALONOSETRON 0.25 MG: 0.25 INJECTION, SOLUTION INTRAVENOUS at 10:06

## 2025-01-29 RX ADMIN — PEGFILGRASTIM 6 MG: KIT SUBCUTANEOUS at 12:43

## 2025-01-29 RX ADMIN — CYCLOPHOSPHAMIDE 1320 MG: 1 INJECTION INTRAVENOUS at 12:06

## 2025-01-29 RX ADMIN — DOCETAXEL 165 MG: 20 INJECTION, SOLUTION INTRAVENOUS at 10:58

## 2025-01-29 RX ADMIN — FAMOTIDINE 20 MG: 10 INJECTION INTRAVENOUS at 10:07

## 2025-01-29 RX ADMIN — DIPHENHYDRAMINE HYDROCHLORIDE 25 MG: 25 CAPSULE ORAL at 10:05

## 2025-01-29 NOTE — PROGRESS NOTES
Subjective     REASON FOR CONSULTATION:  breast cancer  Provide an opinion on any further workup or treatment                             REQUESTING PHYSICIAN:  Vincent    RECORDS OBTAINED:  Records of the patients history including those obtained from the referring provider were reviewed and summarized in detail.    HISTORY OF PRESENT ILLNESS:  The patient is a 64 y.o. year old female who is here for an opinion about the above issue.    History of Present Illness   This is a 64-year-old woman with COPD/chronic hypoxic respiratory failure on O2 2 L by nasal cannula, hyperlipidemia, hypertension, type 2 diabetes referred for adjuvant recommendations of breast cancer.  The patient had abnormal screening mammography on 9/11/2024 showing scattered fibroglandular densities and a focal asymmetry in the lateral right breast.  A follow-up diagnostic mammogram and ultrasound were performed 9/13/2024 showing at the 7 o'clock position of the right breast mass 1.1 x 1.1 x 0.8 cm in size.  An ultrasound biopsy was performed on 9/13/2024 showed invasive lobular carcinoma with pleomorphic features grade 2 (3+2+1) no lymph-vascular invasion or DCIS.  Tumor was negative for ER IL and HER2 1+ with Ki-67 5%.  Biopsy of the right axillary lymph node was negative.  Bilateral breast MRI 9/11/2024-scattered fibroglandular tissue, irregular mass inferior lateral right breast 7:00 11 cm from the nipple 1.0 x 1.0 x 0.8 cm and a questionable low right axillary lymph node.    She was taken to the operating room on 12/4/2024 and underwent a right lumpectomy sentinel lymph node procedure.  Pathology showed invasive lobular carcinoma grade 2 measuring 1.3 cm with margins negative for invasive carcinoma.  3 axillary lymph nodes were negative for invasive carcinoma.    The patient's sister and mother had breast cancer. [employer] 9 gene genetic panel was performed and negative.    She returns today for follow up due for cycle 2 TC.  She is now taking  hydroxyzine which is helping with her anxiety and is takin insulin to help her glucose   She has had some sinus congestion with fullness in her ears and frontal pressure.  She reports a bronchial cough at times.  She denies fevers or chills over the past few days though did have one day the past cycle where her temperature was 101.  She does think she is getting a sinus infection.    Past Medical History:   Diagnosis Date    Anemia     Anxiety     Arthritis     Asthma 10/01    Rsv    Breast cancer, right     Chronic hypoxic respiratory failure     2023, 2024 COVID AND RSV - FOLLOWED BY DR SAVAGE    COPD (chronic obstructive pulmonary disease)     Depression     Diabetes mellitus     Fissure, anal     GERD (gastroesophageal reflux disease)     Hemorrhoid     History of anemia     History of COVID-19     COVID PNEUMONIA  9/2023, 10/2024    Hyperlipidemia     Hypertension     On home O2     2L NC    Psoriasis     Pulmonary nodule     4 MM LEFT LOWER LOBE NONCALCIFIED PER CT REPORT    RSV infection 10/01/2023    ADMITTED TO [facility]    Sleep apnea     Urinary and fecal incontinence         Past Surgical History:   Procedure Laterality Date    BREAST LUMPECTOMY WITH SENTINEL NODE BIOPSY Right 12/04/2024    Procedure: right breast Kathrine guided lumpectomy with sentinel lymph node biopsy;  Surgeon: Shi Kaur MD;  Location: [facility] OR;  Service: General;  Laterality: Right;    BREAST SURGERY Bilateral 12/04/2024    Procedure: RIGHT - ONCOPLASTIC RECONSTRUCTION LEFT - BREAST REDUCTION FOR SYMMETRY;  Surgeon: Flaquito Yoder MD;  Location: [facility];  Service: Plastics;  Laterality: Bilateral;    COLONOSCOPY N/A     HEMORRHOIDECTOMY  2018    HYSTERECTOMY N/A     OTHER SURGICAL HISTORY      LABIAL SURGERY TO REPLACE URETHRA D/T COMPLICATIOSN FROM PREVIOUS SURGERIES AND MESH    PELVIC FLOOR REPAIR N/A     RECTOCELE REPAIR N/A     TOTAL HIP ARTHROPLASTY Left 04/2023    TUBAL ABDOMINAL LIGATION       URETHRA SURGERY      X4        Current Outpatient Medications on File Prior to Visit   Medication Sig Dispense Refill    albuterol (PROVENTIL) (2.5 MG/3ML) 0.083% nebulizer solution Take 2.5 mg by nebulization 3 (Three) Times a Day.      albuterol sulfate HFA (Proventil HFA) 108 (90 Base) MCG/ACT inhaler Inhale 2 puffs Every 4 (Four) Hours As Needed for Wheezing. 8.5 g 1    Ascorbic Acid (Vitamin C) 500 MG capsule Take 500 mg by mouth Daily. HELD FOR OR      aspirin 81 MG EC tablet Take 1 tablet by mouth Daily. HELD FOR OR      atorvastatin (LIPITOR) 40 MG tablet Take 1 tablet by mouth Daily.      B Complex Vitamins (vitamin b complex) capsule capsule Take 1 capsule by mouth Daily. HELD FOR OR      Cholecalciferol 25 MCG (1000 UT) tablet Take 1 tablet by mouth Daily. HELD FOR OR      Continuous Blood Gluc Sensor (Dexcom G6 Sensor)       Continuous Blood Gluc Transmit (Dexcom G6 Transmitter) misc       dexAMETHasone (DECADRON) 4 MG tablet Take 2 tablets oral twice a day for 3 consecutive days beginning the day before chemotherapy and continue for 6 doses. 12 tablet 3    Diphenhydramine-Aluminum-Magnesium-Simethicone-Lidocaine-Nystatin Swish and swallow 5 mL Every 4 (Four) Hours As Needed (mucositis). Recipe is Benadryl Elixir 60cc Maalox 200 cc Viscous Lidocaine 30cc Nystatin 120cc 410 mL 1    glipizide (GLUCOTROL XL) 2.5 MG 24 hr tablet Take 1 tablet by mouth Daily.      hydrOXYzine pamoate (VISTARIL) 25 MG capsule Take 1 capsule by mouth 3 (Three) Times a Day As Needed for Itching.      Insulin Glargine (BASAGLAR KWIKPEN SC) Inject 60 Units under the skin into the appropriate area as directed Daily.      ipratropium-albuterol (DUO-NEB) 0.5-2.5 mg/3 ml nebulizer Take 3 mL by nebulization Every 4 (Four) Hours As Needed for Wheezing. 90 mL 1    lisinopril (PRINIVIL,ZESTRIL) 10 MG tablet Take 1 tablet by mouth Daily.      Loratadine (Claritin) 10 MG capsule Take  by mouth.      nicotine (NICODERM CQ) 21 MG/24HR patch  Place 1 patch on the skin as directed by provider Daily.      ondansetron (ZOFRAN) 8 MG tablet Take 1 tablet by mouth 3 (Three) Times a Day As Needed for Nausea or Vomiting. 30 tablet 5    Trelegy Ellipta 100-62.5-25 MCG/ACT inhaler Inhale 1 puff Daily.       Current Facility-Administered Medications on File Prior to Visit   Medication Dose Route Frequency Provider Last Rate Last Admin    [DISCONTINUED] sodium chloride 0.9 % flush 10 mL  10 mL Intravenous PRN Alexey Vallejo MD   10 mL at 25 1520        ALLERGIES:    Allergies   Allergen Reactions    Erythromycin Rash     ALL MYCINS BREAK HER OUT    Nitrofurantoin Headache and Rash     CAUSES FEVER, HEADACHES    Codeine Headache     headaches        Social History     Socioeconomic History    Marital status:     Number of children: 2   Tobacco Use    Smoking status: Former     Current packs/day: 0.00     Average packs/day: 1 pack/day for 49.7 years (49.7 ttl pk-yrs)     Types: Cigarettes     Start date: 1974     Quit date: 10/1/2023     Years since quittin.3     Passive exposure: Past    Smokeless tobacco: Never    Tobacco comments:     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   Vaping Use    Vaping status: Never Used   Substance and Sexual Activity    Alcohol use: Never    Drug use: Never    Sexual activity: Not Currently     Partners: Male     Birth control/protection: Post-menopausal, Hysterectomy        Family History   Problem Relation Age of Onset    Heart disease Mother     Hypertension Mother     Diabetes Mother     Cancer Mother         Bladder cancer Breast Cancer mastectomy    Breast cancer Mother     Asthma Mother     Dementia Mother     Hyperlipidemia Father     Cancer Father     Diabetes Father         [facility]  [facility]    Colon cancer Father     Lung cancer Father     Alcohol abuse Sister     Cancer Sister         breast, cancer double mastectomy    Cancer Paternal Aunt     Asthma  "Maternal Grandmother     Alcohol abuse Maternal Grandfather     Cancer Paternal Grandmother     Malig Hyperthermia Neg Hx         Review of Systems   Constitutional:  Positive for fatigue.   HENT:  Positive for congestion, ear pain (ear fullness), postnasal drip, rhinorrhea, sinus pressure and sinus pain.    Respiratory:  Positive for cough and shortness of breath.    Cardiovascular: Negative.    Gastrointestinal: Negative.    Genitourinary: Negative.    Musculoskeletal: Negative.    Skin: Negative.    Neurological: Negative.    Hematological: Negative.    Psychiatric/Behavioral:  The patient is nervous/anxious.         Objective     Vitals:    01/29/25 0843   BP: 114/65   Pulse: 103   Resp: 16   Temp: 99.6 °F (37.6 °C)   TempSrc: Infrared   SpO2: 93%   Weight: 99.8 kg (220 lb)   Height: 177.8 cm (70\")   PainSc: 0-No pain           1/29/2025     8:49 AM   Current Status   ECOG score 0       Physical Exam    CONSTITUTIONAL: pleasant well-developed adult woman  HEENT: no icterus, EOMI, belgica TM clear without erythema, no thrush, moist membranes, postnasal drip noted  LYMPH: no cervical or supraclavicular lad  CV: RRR, S1S2, no murmur  RESP: cta bilat, no wheezing, no rales, 02 by NC  BREAST:  bilateral breast scars (reduction/reconstruction)  GI: soft, nontender, no splenomegaly, +BS  MUSC: no edema, normal gait  NEURO: alert and oriented x3, normal strength  PSYCH: normal mood and affect    RECENT LABS:  Hematology WBC   Date Value Ref Range Status   01/28/2025 14.82 (H) 3.40 - 10.80 10*3/mm3 Final     RBC   Date Value Ref Range Status   01/28/2025 5.01 3.77 - 5.28 10*6/mm3 Final     Hemoglobin   Date Value Ref Range Status   01/28/2025 14.4 12.0 - 15.9 g/dL Final     Hematocrit   Date Value Ref Range Status   01/28/2025 44.9 34.0 - 46.6 % Final     Platelets   Date Value Ref Range Status   01/28/2025 377 140 - 450 10*3/mm3 Final        Lab Results   Component Value Date    GLUCOSE 196 (H) 01/28/2025    BUN 15 " 01/28/2025    CREATININE 0.64 01/28/2025     (L) 01/28/2025    K 4.6 01/28/2025    CL 99 01/28/2025    CALCIUM 9.7 01/28/2025    PROTEINTOT 6.6 01/28/2025    ALBUMIN 4.1 01/28/2025    ALT 22 01/28/2025    AST 21 01/28/2025    ALKPHOS 105 01/28/2025    BILITOT 0.4 01/28/2025    GLOB 2.5 01/28/2025    AGRATIO 1.6 01/28/2025    BCR 23.4 01/28/2025    ANIONGAP 9.7 01/28/2025    EGFR 99.4 01/28/2025     Breast MRI:IMPRESSION:   1. Suspicious right breast mass for which right diagnostic mammogram and ultrasound are recommended.   2.  Questionable prominent right axillary lymph node for which right diagnostic axillary ultrasound is recommended.   3.  No MRI evidence of left breast malignancy.     CT chest 4/8/2024-  IMPRESSION:  Impression:     1. Stable 4 mm noncalcified nodule within the left lower lobe. No other acute cardiopulmonary disease. Repeat noncontrast CT scan of the chest would be recommended in 12 months to document continued stability.       Assessment & Plan   *[id number] lobular cancer of the right breast triple negative Ki-67 5%  abnormal screening mammography on 9/11/2024 showing scattered fibroglandular densities and a focal asymmetry in the lateral right breast  diagnostic mammogram and ultrasound were performed 9/13/2024 showing at the 7 o'clock position of the right breast mass 1.1 x 1.1 x 0.8 cm in size.    ultrasound biopsy was performed on 9/13/2024 showed invasive lobular carcinoma with pleomorphic features grade 2 (3+2+1) no lymph-vascular invasion or DCIS.  Tumor was negative for ER NE and HER2 1+ with Ki-67 5%.  Biopsy of the right axillary lymph node was negative  Bilateral breast MRI 9/11/2024-scattered fibroglandular tissue, irregular mass inferior lateral right breast 7:00 11 cm from the nipple 1.0 x 1.0 x 0.8 cm and a questionable low right axillary lymph node.  12/4/2024 - right lumpectomy sentinel lymph node procedure.  Pathology showed invasive lobular carcinoma grade 2 measuring  1.3 cm with margins negative for invasive carcinoma.  3 axillary lymph nodes were negative for invasive carcinoma.  Inv[employer]ae 9 gene genetic panel was performed and negative.  Initiation of TC on 1/8/25  ER visit with normal ANC and mucositis.  She was discharged.  Following day she called into the office and was given Nystatin swish and has had improvement. She also had struggled with diarrhea however once given the protocol had improvement in symptoms.  She did see PCP and has started insulin to help with her elevated glucose.  We also reduced her home dexamethasone to 4mg BID the day before, day of and day after Taxotere.  PCP also prescribed hydroxyzine with improvement in her anxiety.  1/29/2025 Returns for C2 TC with Neulasta.  Feeling better with medication adjustments as above. She is having symptoms of sinus infection and has a history of this. Will proceed with Cefdinir for her sinus infection and proceed with treatment today.      *Lung nodule under surveillance by pulmonary medicine    *Comorbidities-COPD with chronic hypoxic respiratory failure on O2 2 L by nasal cannula, diabetes, hypertension, hyperlipidemia    Oncology plan/recommendations:  Proceed with TC chemotherapy 2/4 today with Neulasta  Weekly cbc RN review  Weekly PICC flush and dressing change  Cefdinir twice daily x 7 days  3 wk md/np visit lab and TC#2

## 2025-02-04 ENCOUNTER — DOCUMENTATION (OUTPATIENT)
Dept: RADIATION ONCOLOGY | Facility: HOSPITAL | Age: 65
End: 2025-02-04
Payer: COMMERCIAL

## 2025-02-04 ENCOUNTER — INFUSION (OUTPATIENT)
Dept: ONCOLOGY | Facility: HOSPITAL | Age: 65
End: 2025-02-04
Payer: COMMERCIAL

## 2025-02-04 VITALS
OXYGEN SATURATION: 92 % | SYSTOLIC BLOOD PRESSURE: 126 MMHG | DIASTOLIC BLOOD PRESSURE: 70 MMHG | HEART RATE: 92 BPM | TEMPERATURE: 98 F

## 2025-02-04 DIAGNOSIS — Z45.2 FITTING AND ADJUSTMENT OF VASCULAR CATHETER: Primary | ICD-10-CM

## 2025-02-04 DIAGNOSIS — C50.919 MALIGNANT NEOPLASM OF FEMALE BREAST, UNSPECIFIED ESTROGEN RECEPTOR STATUS, UNSPECIFIED LATERALITY, UNSPECIFIED SITE OF BREAST: ICD-10-CM

## 2025-02-04 PROCEDURE — 96523 IRRIG DRUG DELIVERY DEVICE: CPT

## 2025-02-04 PROCEDURE — G0463 HOSPITAL OUTPT CLINIC VISIT: HCPCS

## 2025-02-04 RX ORDER — SODIUM CHLORIDE 0.9 % (FLUSH) 0.9 %
10 SYRINGE (ML) INJECTION AS NEEDED
Status: DISCONTINUED | OUTPATIENT
Start: 2025-02-04 | End: 2025-02-04 | Stop reason: HOSPADM

## 2025-02-04 RX ORDER — SODIUM CHLORIDE 0.9 % (FLUSH) 0.9 %
10 SYRINGE (ML) INJECTION AS NEEDED
OUTPATIENT
Start: 2025-02-04

## 2025-02-04 RX ADMIN — Medication 10 ML: at 14:48

## 2025-02-04 NOTE — NURSING NOTE
Pt here for PICC line dressing change. She reports nasal congestion and post nasal drip.  She reports a fever of 101.0 this Saturday but has been running temp max of 99 since then.  She has one more day of omnicef that was ordered last week.   A green top tube was obtained rather than a purple top tube for CBC.  Pt left infusion center before I was made aware.  Per Dr Vallejo, jaden for patient not to return for CBC today.   Pt called at home to alert her of the wrong lab tube drawn and a cbc will not result to her.   She was instructed to call office if she does spike a fever of 100.4 of higher or any other issues.  Pt V/U.

## 2025-02-11 ENCOUNTER — INFUSION (OUTPATIENT)
Dept: ONCOLOGY | Facility: HOSPITAL | Age: 65
End: 2025-02-11
Payer: COMMERCIAL

## 2025-02-11 VITALS
SYSTOLIC BLOOD PRESSURE: 107 MMHG | TEMPERATURE: 93 F | OXYGEN SATURATION: 91 % | DIASTOLIC BLOOD PRESSURE: 81 MMHG | HEART RATE: 89 BPM

## 2025-02-11 DIAGNOSIS — C50.919 MALIGNANT NEOPLASM OF FEMALE BREAST, UNSPECIFIED ESTROGEN RECEPTOR STATUS, UNSPECIFIED LATERALITY, UNSPECIFIED SITE OF BREAST: Primary | ICD-10-CM

## 2025-02-11 DIAGNOSIS — Z45.2 FITTING AND ADJUSTMENT OF VASCULAR CATHETER: ICD-10-CM

## 2025-02-11 LAB
BASOPHILS # BLD AUTO: 0.05 10*3/MM3 (ref 0–0.2)
BASOPHILS NFR BLD AUTO: 0.2 % (ref 0–1.5)
DEPRECATED RDW RBC AUTO: 52 FL (ref 37–54)
EOSINOPHIL # BLD AUTO: 0.15 10*3/MM3 (ref 0–0.4)
EOSINOPHIL NFR BLD AUTO: 0.7 % (ref 0.3–6.2)
ERYTHROCYTE [DISTWIDTH] IN BLOOD BY AUTOMATED COUNT: 15.4 % (ref 12.3–15.4)
HCT VFR BLD AUTO: 44.2 % (ref 34–46.6)
HGB BLD-MCNC: 13.7 G/DL (ref 12–15.9)
IMM GRANULOCYTES # BLD AUTO: 1.56 10*3/MM3 (ref 0–0.05)
IMM GRANULOCYTES NFR BLD AUTO: 7.1 % (ref 0–0.5)
LYMPHOCYTES # BLD AUTO: 2.94 10*3/MM3 (ref 0.7–3.1)
LYMPHOCYTES NFR BLD AUTO: 13.3 % (ref 19.6–45.3)
MCH RBC QN AUTO: 28.8 PG (ref 26.6–33)
MCHC RBC AUTO-ENTMCNC: 31 G/DL (ref 31.5–35.7)
MCV RBC AUTO: 92.9 FL (ref 79–97)
MONOCYTES # BLD AUTO: 1.04 10*3/MM3 (ref 0.1–0.9)
MONOCYTES NFR BLD AUTO: 4.7 % (ref 5–12)
NEUTROPHILS NFR BLD AUTO: 16.36 10*3/MM3 (ref 1.7–7)
NEUTROPHILS NFR BLD AUTO: 74 % (ref 42.7–76)
PLATELET # BLD AUTO: 269 10*3/MM3 (ref 140–450)
PMV BLD AUTO: 9.7 FL (ref 6–12)
RBC # BLD AUTO: 4.76 10*6/MM3 (ref 3.77–5.28)
WBC NRBC COR # BLD AUTO: 22.1 10*3/MM3 (ref 3.4–10.8)

## 2025-02-11 PROCEDURE — 85025 COMPLETE CBC W/AUTO DIFF WBC: CPT | Performed by: INTERNAL MEDICINE

## 2025-02-11 PROCEDURE — 36591 DRAW BLOOD OFF VENOUS DEVICE: CPT

## 2025-02-11 RX ORDER — SODIUM CHLORIDE 0.9 % (FLUSH) 0.9 %
10 SYRINGE (ML) INJECTION AS NEEDED
OUTPATIENT
Start: 2025-02-11

## 2025-02-11 RX ORDER — SODIUM CHLORIDE 0.9 % (FLUSH) 0.9 %
10 SYRINGE (ML) INJECTION AS NEEDED
Status: DISCONTINUED | OUTPATIENT
Start: 2025-02-11 | End: 2025-02-11 | Stop reason: HOSPADM

## 2025-02-11 RX ADMIN — Medication 10 ML: at 15:08

## 2025-02-11 NOTE — NURSING NOTE
Pt here for PICC line dressing change.  Pt reports post nasal drip and hoarse voice for the past two weeks. Pt reports soreness at back of mouth/throat and is using laurent's magic mouthwash.  Throat with some redness and tongue appears fissured.  No white patches noted.  WBC/ANC increased today.  Pt did receive neulasta on body on 1/29. Pt denies fevers at home.  Pt with bilat lower extremity edema which she reports she has chronic leg edema and takes lasix at home although it is not listed on her medication list.  Dr Vallejo aware of lab values today. Pt instructed to call office if she develops a temperature of 100.5 or higher or for any questions or problems.  Pt V/U.

## 2025-02-18 ENCOUNTER — PATIENT OUTREACH (OUTPATIENT)
Dept: OTHER | Facility: HOSPITAL | Age: 65
End: 2025-02-18
Payer: COMMERCIAL

## 2025-02-18 NOTE — PROGRESS NOTES
Called Ms. Nath to see how she was doing. She stated she is managing through her chemo treatments but has had many different symptoms. She stated her oncologist has been helpful with treating those symptoms and she has no needs at this time. She has an infusion tomorrow and her last one will be in three weeks. After that she will start radiation at [place] since it is closer to home. Otherwise, she has no needs at this time. She was thankful for the call and will reach out if any questions or needs arise.

## 2025-02-19 ENCOUNTER — INFUSION (OUTPATIENT)
Dept: ONCOLOGY | Facility: HOSPITAL | Age: 65
End: 2025-02-19
Payer: COMMERCIAL

## 2025-02-19 ENCOUNTER — OFFICE VISIT (OUTPATIENT)
Dept: ONCOLOGY | Facility: CLINIC | Age: 65
End: 2025-02-19
Payer: COMMERCIAL

## 2025-02-19 VITALS
BODY MASS INDEX: 30.49 KG/M2 | HEIGHT: 70 IN | OXYGEN SATURATION: 95 % | TEMPERATURE: 98.7 F | WEIGHT: 213 LBS | DIASTOLIC BLOOD PRESSURE: 70 MMHG | SYSTOLIC BLOOD PRESSURE: 111 MMHG | HEART RATE: 87 BPM | RESPIRATION RATE: 16 BRPM

## 2025-02-19 DIAGNOSIS — Z79.899 HIGH RISK MEDICATION USE: ICD-10-CM

## 2025-02-19 DIAGNOSIS — C50.919 MALIGNANT NEOPLASM OF FEMALE BREAST, UNSPECIFIED ESTROGEN RECEPTOR STATUS, UNSPECIFIED LATERALITY, UNSPECIFIED SITE OF BREAST: ICD-10-CM

## 2025-02-19 DIAGNOSIS — Z45.2 FITTING AND ADJUSTMENT OF VASCULAR CATHETER: ICD-10-CM

## 2025-02-19 DIAGNOSIS — C50.919 MALIGNANT NEOPLASM OF FEMALE BREAST, UNSPECIFIED ESTROGEN RECEPTOR STATUS, UNSPECIFIED LATERALITY, UNSPECIFIED SITE OF BREAST: Primary | ICD-10-CM

## 2025-02-19 DIAGNOSIS — Z79.899 HIGH RISK MEDICATION USE: Primary | ICD-10-CM

## 2025-02-19 DIAGNOSIS — R09.82 POSTNASAL DRIP: ICD-10-CM

## 2025-02-19 LAB
ALBUMIN SERPL-MCNC: 3.7 G/DL (ref 3.5–5.2)
ALBUMIN/GLOB SERPL: 1.6 G/DL
ALP SERPL-CCNC: 103 U/L (ref 39–117)
ALT SERPL W P-5'-P-CCNC: 12 U/L (ref 1–33)
ANION GAP SERPL CALCULATED.3IONS-SCNC: 8.2 MMOL/L (ref 5–15)
AST SERPL-CCNC: 13 U/L (ref 1–32)
BASOPHILS # BLD AUTO: 0.03 10*3/MM3 (ref 0–0.2)
BASOPHILS NFR BLD AUTO: 0.2 % (ref 0–1.5)
BILIRUB SERPL-MCNC: 0.3 MG/DL (ref 0–1.2)
BUN SERPL-MCNC: 11 MG/DL (ref 8–23)
BUN/CREAT SERPL: 19.6 (ref 7–25)
CALCIUM SPEC-SCNC: 9.1 MG/DL (ref 8.6–10.5)
CHLORIDE SERPL-SCNC: 101 MMOL/L (ref 98–107)
CO2 SERPL-SCNC: 27.8 MMOL/L (ref 22–29)
CREAT SERPL-MCNC: 0.56 MG/DL (ref 0.57–1)
DEPRECATED RDW RBC AUTO: 52.8 FL (ref 37–54)
EGFRCR SERPLBLD CKD-EPI 2021: 102.7 ML/MIN/1.73
EOSINOPHIL # BLD AUTO: 0.02 10*3/MM3 (ref 0–0.4)
EOSINOPHIL NFR BLD AUTO: 0.2 % (ref 0.3–6.2)
ERYTHROCYTE [DISTWIDTH] IN BLOOD BY AUTOMATED COUNT: 15.7 % (ref 12.3–15.4)
GLOBULIN UR ELPH-MCNC: 2.3 GM/DL
GLUCOSE SERPL-MCNC: 175 MG/DL (ref 65–99)
HCT VFR BLD AUTO: 44 % (ref 34–46.6)
HGB BLD-MCNC: 14 G/DL (ref 12–15.9)
IMM GRANULOCYTES # BLD AUTO: 0.01 10*3/MM3 (ref 0–0.05)
IMM GRANULOCYTES NFR BLD AUTO: 0.1 % (ref 0–0.5)
LYMPHOCYTES # BLD AUTO: 1.86 10*3/MM3 (ref 0.7–3.1)
LYMPHOCYTES NFR BLD AUTO: 15 % (ref 19.6–45.3)
MCH RBC QN AUTO: 29.2 PG (ref 26.6–33)
MCHC RBC AUTO-ENTMCNC: 31.8 G/DL (ref 31.5–35.7)
MCV RBC AUTO: 91.9 FL (ref 79–97)
MONOCYTES # BLD AUTO: 0.69 10*3/MM3 (ref 0.1–0.9)
MONOCYTES NFR BLD AUTO: 5.6 % (ref 5–12)
NEUTROPHILS NFR BLD AUTO: 78.9 % (ref 42.7–76)
NEUTROPHILS NFR BLD AUTO: 9.76 10*3/MM3 (ref 1.7–7)
PLATELET # BLD AUTO: 305 10*3/MM3 (ref 140–450)
PMV BLD AUTO: 9.7 FL (ref 6–12)
POTASSIUM SERPL-SCNC: 3.9 MMOL/L (ref 3.5–5.2)
PROT SERPL-MCNC: 6 G/DL (ref 6–8.5)
RBC # BLD AUTO: 4.79 10*6/MM3 (ref 3.77–5.28)
SODIUM SERPL-SCNC: 137 MMOL/L (ref 136–145)
WBC NRBC COR # BLD AUTO: 12.37 10*3/MM3 (ref 3.4–10.8)

## 2025-02-19 PROCEDURE — 96375 TX/PRO/DX INJ NEW DRUG ADDON: CPT

## 2025-02-19 PROCEDURE — 63710000001 DEXAMETHASONE PER 0.25 MG: Performed by: NURSE PRACTITIONER

## 2025-02-19 PROCEDURE — 96413 CHEMO IV INFUSION 1 HR: CPT

## 2025-02-19 PROCEDURE — 63710000001 DIPHENHYDRAMINE PER 50 MG: Performed by: NURSE PRACTITIONER

## 2025-02-19 PROCEDURE — 80053 COMPREHEN METABOLIC PANEL: CPT | Performed by: INTERNAL MEDICINE

## 2025-02-19 PROCEDURE — 96417 CHEMO IV INFUS EACH ADDL SEQ: CPT

## 2025-02-19 PROCEDURE — 25010000002 CYCLOPHOSPHAMIDE 2 GM/10ML SOLUTION 10 ML VIAL: Performed by: NURSE PRACTITIONER

## 2025-02-19 PROCEDURE — 25010000002 PALONOSETRON 0.25 MG/5ML SOLUTION PREFILLED SYRINGE: Performed by: NURSE PRACTITIONER

## 2025-02-19 PROCEDURE — 25010000002 PEGFILGRASTIM 6 MG/0.6ML PREFILLED SYRINGE KIT: Performed by: NURSE PRACTITIONER

## 2025-02-19 PROCEDURE — 25010000002 DOCETAXEL 20 MG/ML SOLUTION 1 ML VIAL: Performed by: NURSE PRACTITIONER

## 2025-02-19 PROCEDURE — 96377 APPLICATON ON-BODY INJECTOR: CPT

## 2025-02-19 PROCEDURE — 85025 COMPLETE CBC W/AUTO DIFF WBC: CPT | Performed by: INTERNAL MEDICINE

## 2025-02-19 PROCEDURE — 25810000003 SODIUM CHLORIDE 0.9 % SOLUTION 250 ML FLEX CONT: Performed by: NURSE PRACTITIONER

## 2025-02-19 PROCEDURE — 25010000002 DOCETAXEL 20 MG/ML SOLUTION 4 ML VIAL: Performed by: NURSE PRACTITIONER

## 2025-02-19 RX ORDER — HYDROCORTISONE SODIUM SUCCINATE 100 MG/2ML
100 INJECTION INTRAMUSCULAR; INTRAVENOUS AS NEEDED
Status: CANCELLED | OUTPATIENT
Start: 2025-02-19

## 2025-02-19 RX ORDER — FAMOTIDINE 10 MG/ML
20 INJECTION, SOLUTION INTRAVENOUS ONCE
Status: COMPLETED | OUTPATIENT
Start: 2025-02-19 | End: 2025-02-19

## 2025-02-19 RX ORDER — DIPHENHYDRAMINE HCL 25 MG
25 CAPSULE ORAL ONCE
Status: CANCELLED | OUTPATIENT
Start: 2025-02-19

## 2025-02-19 RX ORDER — SODIUM CHLORIDE 9 MG/ML
20 INJECTION, SOLUTION INTRAVENOUS ONCE
Status: CANCELLED | OUTPATIENT
Start: 2025-02-19

## 2025-02-19 RX ORDER — PALONOSETRON 0.05 MG/ML
0.25 INJECTION, SOLUTION INTRAVENOUS ONCE
Status: CANCELLED | OUTPATIENT
Start: 2025-02-19

## 2025-02-19 RX ORDER — PALONOSETRON 0.05 MG/ML
0.25 INJECTION, SOLUTION INTRAVENOUS ONCE
Status: COMPLETED | OUTPATIENT
Start: 2025-02-19 | End: 2025-02-19

## 2025-02-19 RX ORDER — DIPHENHYDRAMINE HYDROCHLORIDE 50 MG/ML
50 INJECTION INTRAMUSCULAR; INTRAVENOUS AS NEEDED
Status: CANCELLED | OUTPATIENT
Start: 2025-02-19

## 2025-02-19 RX ORDER — SODIUM CHLORIDE 0.9 % (FLUSH) 0.9 %
10 SYRINGE (ML) INJECTION AS NEEDED
OUTPATIENT
Start: 2025-02-19

## 2025-02-19 RX ORDER — FAMOTIDINE 10 MG/ML
20 INJECTION, SOLUTION INTRAVENOUS ONCE
Status: CANCELLED | OUTPATIENT
Start: 2025-02-19

## 2025-02-19 RX ORDER — DIPHENHYDRAMINE HCL 25 MG
25 CAPSULE ORAL ONCE
Status: COMPLETED | OUTPATIENT
Start: 2025-02-19 | End: 2025-02-19

## 2025-02-19 RX ORDER — FAMOTIDINE 10 MG/ML
20 INJECTION, SOLUTION INTRAVENOUS AS NEEDED
Status: CANCELLED | OUTPATIENT
Start: 2025-02-19

## 2025-02-19 RX ORDER — SODIUM CHLORIDE 0.9 % (FLUSH) 0.9 %
10 SYRINGE (ML) INJECTION AS NEEDED
Status: DISCONTINUED | OUTPATIENT
Start: 2025-02-19 | End: 2025-02-19 | Stop reason: HOSPADM

## 2025-02-19 RX ORDER — DEXAMETHASONE 4 MG/1
4 TABLET ORAL ONCE
Status: COMPLETED | OUTPATIENT
Start: 2025-02-19 | End: 2025-02-19

## 2025-02-19 RX ADMIN — DEXAMETHASONE 4 MG: 4 TABLET ORAL at 10:18

## 2025-02-19 RX ADMIN — CYCLOPHOSPHAMIDE 1320 MG: 2 INJECTION INTRAVENOUS at 12:19

## 2025-02-19 RX ADMIN — Medication 10 ML: at 12:52

## 2025-02-19 RX ADMIN — PALONOSETRON 0.25 MG: 0.25 INJECTION, SOLUTION INTRAVENOUS at 10:23

## 2025-02-19 RX ADMIN — FAMOTIDINE 20 MG: 10 INJECTION INTRAVENOUS at 10:23

## 2025-02-19 RX ADMIN — PEGFILGRASTIM 6 MG: KIT SUBCUTANEOUS at 12:20

## 2025-02-19 RX ADMIN — DIPHENHYDRAMINE HYDROCHLORIDE 25 MG: 25 CAPSULE ORAL at 10:23

## 2025-02-19 RX ADMIN — SODIUM CHLORIDE 165 MG: 9 INJECTION, SOLUTION INTRAVENOUS at 11:09

## 2025-02-19 NOTE — PROGRESS NOTES
Subjective     REASON FOR CONSULTATION:  breast cancer  Provide an opinion on any further workup or treatment                             REQUESTING PHYSICIAN:  Vincent    RECORDS OBTAINED:  Records of the patients history including those obtained from the referring provider were reviewed and summarized in detail.    HISTORY OF PRESENT ILLNESS:  The patient is a 64 y.o. year old female who is here for an opinion about the above issue.    History of Present Illness   This is a 64-year-old woman with COPD/chronic hypoxic respiratory failure on O2 2 L by nasal cannula, hyperlipidemia, hypertension, type 2 diabetes referred for adjuvant recommendations of breast cancer.  The patient had abnormal screening mammography on 9/11/2024 showing scattered fibroglandular densities and a focal asymmetry in the lateral right breast.  A follow-up diagnostic mammogram and ultrasound were performed 9/13/2024 showing at the 7 o'clock position of the right breast mass 1.1 x 1.1 x 0.8 cm in size.  An ultrasound biopsy was performed on 9/13/2024 showed invasive lobular carcinoma with pleomorphic features grade 2 (3+2+1) no lymph-vascular invasion or DCIS.  Tumor was negative for ER SD and HER2 1+ with Ki-67 5%.  Biopsy of the right axillary lymph node was negative.  Bilateral breast MRI 9/11/2024-scattered fibroglandular tissue, irregular mass inferior lateral right breast 7:00 11 cm from the nipple 1.0 x 1.0 x 0.8 cm and a questionable low right axillary lymph node.    She was taken to the operating room on 12/4/2024 and underwent a right lumpectomy sentinel lymph node procedure.  Pathology showed invasive lobular carcinoma grade 2 measuring 1.3 cm with margins negative for invasive carcinoma.  3 axillary lymph nodes were negative for invasive carcinoma.    The patient's sister and mother had breast cancer. [employer] 9 gene genetic panel was performed and negative.    She returns today for follow up due for cycle 3 TC.  She is now taking  hydroxyzine which is helping with her anxiety and is takin insulin to help her glucose   she has had some hoarseness over the past 3 weeks though reports the past couple of days it seemed to have been improving to where she is not having to strain to speak.  She has been having some bilateral ankle edema for some time and she reports she have to her lisinopril and that is improving.  She does see her primary care tomorrow.  Blood pressure is well-controlled today.  She denies fevers or chills.  She continues to utilize the mouth rinse and is not currently struggling with mouth sores.  She has a swollen feeling to her hands bilaterally and states at times she has trouble dropping items.  She denies numbness and tingling however and has plan to ice her hands well today as she did not the previous cycle to see if this helps.  She does have pre-existing numbness and tingling in her feet and states it may be slightly worsened intermittently.  Today she states it is close to baseline.  She does have morning postnasal drip with cough that improves as the day goes on.  She occasionally has wheezing at this time but also improves as the day goes on.    Past Medical History:   Diagnosis Date   • Anemia    • Anxiety    • Arthritis    • Asthma 10/01    Rsv   • Breast cancer, right    • Chronic hypoxic respiratory failure     2023, 2024 COVID AND RSV - FOLLOWED BY DR SAVAGE   • COPD (chronic obstructive pulmonary disease)    • Depression    • Diabetes mellitus    • Fissure, anal    • GERD (gastroesophageal reflux disease)    • Hemorrhoid    • History of anemia    • History of COVID-19     COVID PNEUMONIA  9/2023, 10/2024   • Hyperlipidemia    • Hypertension    • On home O2     2L NC   • Psoriasis    • Pulmonary nodule     4 MM LEFT LOWER LOBE NONCALCIFIED PER CT REPORT   • RSV infection 10/01/2023    ADMITTED TO [facility]   • Sleep apnea    • Urinary and fecal incontinence         Past Surgical History:   Procedure Laterality  Date   • BREAST LUMPECTOMY WITH SENTINEL NODE BIOPSY Right 12/04/2024    Procedure: right breast Kathrine guided lumpectomy with sentinel lymph node biopsy;  Surgeon: Shi Kaur MD;  Location: [facility] MAIN OR;  Service: General;  Laterality: Right;   • BREAST SURGERY Bilateral 12/04/2024    Procedure: RIGHT - ONCOPLASTIC RECONSTRUCTION LEFT - BREAST REDUCTION FOR SYMMETRY;  Surgeon: Flaquito Yoder MD;  Location: [facility] MAIN OR;  Service: Plastics;  Laterality: Bilateral;   • COLONOSCOPY N/A    • HEMORRHOIDECTOMY  2018   • HYSTERECTOMY N/A    • OTHER SURGICAL HISTORY      LABIAL SURGERY TO REPLACE URETHRA D/T COMPLICATIOSN FROM PREVIOUS SURGERIES AND MESH   • PELVIC FLOOR REPAIR N/A    • RECTOCELE REPAIR N/A    • TOTAL HIP ARTHROPLASTY Left 04/2023   • TUBAL ABDOMINAL LIGATION     • URETHRA SURGERY      X4        Current Outpatient Medications on File Prior to Visit   Medication Sig Dispense Refill   • albuterol (PROVENTIL) (2.5 MG/3ML) 0.083% nebulizer solution Take 2.5 mg by nebulization 3 (Three) Times a Day.     • Ascorbic Acid (Vitamin C) 500 MG capsule Take 500 mg by mouth Daily. HELD FOR OR     • aspirin 81 MG EC tablet Take 1 tablet by mouth Daily. HELD FOR OR     • atorvastatin (LIPITOR) 40 MG tablet Take 1 tablet by mouth Daily.     • B Complex Vitamins (vitamin b complex) capsule capsule Take 1 capsule by mouth Daily. HELD FOR OR     • Cholecalciferol 25 MCG (1000 UT) tablet Take 1 tablet by mouth Daily. HELD FOR OR     • Continuous Blood Gluc Sensor (Dexcom G6 Sensor)      • Continuous Blood Gluc Transmit (Dexcom G6 Transmitter) misc      • dexAMETHasone (DECADRON) 4 MG tablet Take 2 tablets oral twice a day for 3 consecutive days beginning the day before chemotherapy and continue for 6 doses. 12 tablet 3   • Diphenhydramine-Aluminum-Magnesium-Simethicone-Lidocaine-Nystatin Swish and swallow 5 mL Every 4 (Four) Hours As Needed (mucositis). Recipe is Benadryl Elixir 60cc Maalox 200 cc Viscous  Lidocaine 30cc Nystatin 120cc 410 mL 1   • glipizide (GLUCOTROL XL) 2.5 MG 24 hr tablet Take 1 tablet by mouth Daily.     • hydrOXYzine pamoate (VISTARIL) 25 MG capsule Take 1 capsule by mouth 3 (Three) Times a Day As Needed for Itching.     • Insulin Glargine (BASAGLAR KWIKPEN SC) Inject 60 Units under the skin into the appropriate area as directed Daily.     • Insulin Lispro (humaLOG) 100 UNIT/ML injection Inject 4-16 Units under the skin into the appropriate area as directed As Needed for High Blood Sugar. Sliding scale takes 4 -16 units: 141-180:4 units, 181-220: 6 units, 221-260: 8 units, 261-300: 10 units, 301-350: 12 units, 350-400: 14 units, greater 400: 16 units    Pt takes if blood glucose is greater than 200     • ipratropium-albuterol (DUO-NEB) 0.5-2.5 mg/3 ml nebulizer Take 3 mL by nebulization Every 4 (Four) Hours As Needed for Wheezing. 90 mL 1   • lisinopril (PRINIVIL,ZESTRIL) 10 MG tablet Take 1 tablet by mouth Daily.     • Loratadine (Claritin) 10 MG capsule Take  by mouth.     • nicotine (NICODERM CQ) 21 MG/24HR patch Place 1 patch on the skin as directed by provider Daily.     • ondansetron (ZOFRAN) 8 MG tablet Take 1 tablet by mouth 3 (Three) Times a Day As Needed for Nausea or Vomiting. 30 tablet 5   • Trelegy Ellipta 100-62.5-25 MCG/ACT inhaler Inhale 1 puff Daily.     • albuterol sulfate HFA (Proventil HFA) 108 (90 Base) MCG/ACT inhaler Inhale 2 puffs Every 4 (Four) Hours As Needed for Wheezing. 8.5 g 1     No current facility-administered medications on file prior to visit.        ALLERGIES:    Allergies   Allergen Reactions   • Erythromycin Rash     ALL MYCINS BREAK HER OUT   • Nitrofurantoin Headache and Rash     CAUSES FEVER, HEADACHES   • Codeine Headache     headaches        Social History     Socioeconomic History   • Marital status:    • Number of children: 2   Tobacco Use   • Smoking status: Former     Current packs/day: 0.00     Average packs/day: 1 pack/day for 49.7 years  "(49.7 ttl pk-yrs)     Types: Cigarettes     Start date: 1974     Quit date: 10/1/2023     Years since quittin.3     Passive exposure: Past   • Smokeless tobacco: Never   • Tobacco comments:     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   Vaping Use   • Vaping status: Never Used   Substance and Sexual Activity   • Alcohol use: Never   • Drug use: Never   • Sexual activity: Not Currently     Partners: Male     Birth control/protection: Post-menopausal, Hysterectomy        Family History   Problem Relation Age of Onset   • Heart disease Mother    • Hypertension Mother    • Diabetes Mother    • Cancer Mother         Bladder cancer Breast Cancer mastectomy   • Breast cancer Mother    • Asthma Mother    • Dementia Mother    • Hyperlipidemia Father    • Cancer Father    • Diabetes Father         [facility]  [facility] later   • Colon cancer Father    • Lung cancer Father    • Alcohol abuse Sister    • Cancer Sister         breast, cancer double mastectomy   • Cancer Paternal Aunt    • Asthma Maternal Grandmother    • Alcohol abuse Maternal Grandfather    • Cancer Paternal Grandmother    • Malig Hyperthermia Neg Hx         Review of Systems   Constitutional:  Positive for fatigue.   HENT:  Positive for congestion, postnasal drip and rhinorrhea.    Respiratory:  Positive for cough, shortness of breath and wheezing (in am).    Cardiovascular:  Positive for leg swelling.   Gastrointestinal: Negative.    Genitourinary: Negative.    Musculoskeletal: Negative.    Skin: Negative.    Neurological: Negative.    Hematological: Negative.    Psychiatric/Behavioral:  The patient is nervous/anxious.         Objective     Vitals:    25 0903   BP: 111/70   Pulse: 87   Resp: 16   Temp: 98.7 °F (37.1 °C)   TempSrc: Infrared   SpO2: 95%   Weight: 96.6 kg (213 lb)   Height: 177.8 cm (70\")   PainSc: 0-No pain           2025     9:10 AM   Current Status   ECOG score 0       Physical Exam  "   CONSTITUTIONAL: pleasant well-developed adult woman  HEENT: no icterus, EOMI, no thrush, moist membranes, postnasal drip noted  LYMPH: no cervical or supraclavicular lad  CV: RRR, S1S2, no murmur  RESP: cta bilat, faint expiratory wheezing left side and RUQ, 02 by NC  BREAST:  bilateral breast scars (reduction/reconstruction)  GI: soft, nontender, no splenomegaly, +BS  MUSC: no edema, normal gait  NEURO: alert and oriented x3, normal strength  PSYCH: normal mood and affect    RECENT LABS:  Hematology WBC   Date Value Ref Range Status   02/19/2025 12.37 (H) 3.40 - 10.80 10*3/mm3 Final     RBC   Date Value Ref Range Status   02/19/2025 4.79 3.77 - 5.28 10*6/mm3 Final     Hemoglobin   Date Value Ref Range Status   02/19/2025 14.0 12.0 - 15.9 g/dL Final     Hematocrit   Date Value Ref Range Status   02/19/2025 44.0 34.0 - 46.6 % Final     Platelets   Date Value Ref Range Status   02/19/2025 305 140 - 450 10*3/mm3 Final        Lab Results   Component Value Date    GLUCOSE 196 (H) 01/28/2025    BUN 15 01/28/2025    CREATININE 0.64 01/28/2025     (L) 01/28/2025    K 4.6 01/28/2025    CL 99 01/28/2025    CALCIUM 9.7 01/28/2025    PROTEINTOT 6.6 01/28/2025    ALBUMIN 4.1 01/28/2025    ALT 22 01/28/2025    AST 21 01/28/2025    ALKPHOS 105 01/28/2025    BILITOT 0.4 01/28/2025    GLOB 2.5 01/28/2025    AGRATIO 1.6 01/28/2025    BCR 23.4 01/28/2025    ANIONGAP 9.7 01/28/2025    EGFR 99.4 01/28/2025     Breast MRI:IMPRESSION:   1. Suspicious right breast mass for which right diagnostic mammogram and ultrasound are recommended.   2.  Questionable prominent right axillary lymph node for which right diagnostic axillary ultrasound is recommended.   3.  No MRI evidence of left breast malignancy.     CT chest 4/8/2024-  IMPRESSION:  Impression:     1. Stable 4 mm noncalcified nodule within the left lower lobe. No other acute cardiopulmonary disease. Repeat noncontrast CT scan of the chest would be recommended in 12 months to  document continued stability.       Assessment & Plan   *[id number] lobular cancer of the right breast triple negative Ki-67 5%  abnormal screening mammography on 9/11/2024 showing scattered fibroglandular densities and a focal asymmetry in the lateral right breast  diagnostic mammogram and ultrasound were performed 9/13/2024 showing at the 7 o'clock position of the right breast mass 1.1 x 1.1 x 0.8 cm in size.    ultrasound biopsy was performed on 9/13/2024 showed invasive lobular carcinoma with pleomorphic features grade 2 (3+2+1) no lymph-vascular invasion or DCIS.  Tumor was negative for ER NM and HER2 1+ with Ki-67 5%.  Biopsy of the right axillary lymph node was negative  Bilateral breast MRI 9/11/2024-scattered fibroglandular tissue, irregular mass inferior lateral right breast 7:00 11 cm from the nipple 1.0 x 1.0 x 0.8 cm and a questionable low right axillary lymph node.  12/4/2024 - right lumpectomy sentinel lymph node procedure.  Pathology showed invasive lobular carcinoma grade 2 measuring 1.3 cm with margins negative for invasive carcinoma.  3 axillary lymph nodes were negative for invasive carcinoma.  [employer] 9 gene genetic panel was performed and negative.  Initiation of TC on 1/8/25  ER visit with normal ANC and mucositis.  She was discharged.  Following day she called into the office and was given Nystatin swish and has had improvement. She also had struggled with diarrhea however once given the protocol had improvement in symptoms.  She did see PCP and has started insulin to help with her elevated glucose.  We also reduced her home dexamethasone to 4mg BID the day before, day of and day after Taxotere.  PCP also prescribed hydroxyzine with improvement in her anxiety.  1/29/2025 Returns for C2 TC with Neulasta.  Feeling better with medication adjustments as above. She is having symptoms of sinus infection and has a history of this. Will proceed with Cefdinir for her sinus infection and proceed with  treatment today.    2/19/2025 returns today for cycle 3 TC with Neulasta.  Patient reports has been hoarse the past 3 weeks but states over the past couple of days it seems to be improving and her voice is stronger today.  Will continue to monitor this.  She reported intermittent worsening of neuropathy in her feet but this is not consistent will continue to monitor.  She also noticed decreased  strength at times recently though not noted on exam today.  She is icing her hands for the Taxotere infusion and denies any neuropathy.  Will proceed at same dose as previous.    *Lung nodule under surveillance by pulmonary medicine    *Comorbidities-COPD with chronic hypoxic respiratory failure on O2 2 L by nasal cannula, diabetes, hypertension, hyperlipidemia    *Bilateral lower extremity edema-patient reduced her lisinopril by half dose and states her edema is improving.  She is following up with primary care tomorrow regarding the medication change.  Blood pressure is well-controlled today  BP: 111/70     *COPD: Patient was wheezing on exam today.  She does have albuterol inhaler with her was asked to take this in the exam room with improvement in wheezing and cough.  She is struggling with postnasal drip as well and will trial initiation of Flonase.    Oncology plan/recommendations:  Proceed with TC chemotherapy 3/4 today with Neulasta  Weekly cbc RN review  Follow-up with PCP regarding lisinopril dose change  Continue albuterol inhaler every 4 hours as needed for wheezing  Trial Flonase daily for postnasal drip  Weekly PICC flush and dressing change  3 wk md/np visit lab and TC#4

## 2025-02-21 ENCOUNTER — PATIENT ROUNDING (BHMG ONLY) (OUTPATIENT)
Dept: ONCOLOGY | Facility: CLINIC | Age: 65
End: 2025-02-21
Payer: COMMERCIAL

## 2025-02-26 ENCOUNTER — INFUSION (OUTPATIENT)
Dept: ONCOLOGY | Facility: HOSPITAL | Age: 65
End: 2025-02-26
Payer: COMMERCIAL

## 2025-02-26 ENCOUNTER — LAB (OUTPATIENT)
Dept: LAB | Facility: HOSPITAL | Age: 65
End: 2025-02-26
Payer: COMMERCIAL

## 2025-02-26 VITALS — TEMPERATURE: 98.5 F | HEART RATE: 83 BPM | SYSTOLIC BLOOD PRESSURE: 116 MMHG | DIASTOLIC BLOOD PRESSURE: 73 MMHG

## 2025-02-26 DIAGNOSIS — Z79.899 HIGH RISK MEDICATION USE: Primary | ICD-10-CM

## 2025-02-26 DIAGNOSIS — Z45.2 FITTING AND ADJUSTMENT OF VASCULAR CATHETER: ICD-10-CM

## 2025-02-26 DIAGNOSIS — C50.919 MALIGNANT NEOPLASM OF FEMALE BREAST, UNSPECIFIED ESTROGEN RECEPTOR STATUS, UNSPECIFIED LATERALITY, UNSPECIFIED SITE OF BREAST: ICD-10-CM

## 2025-02-26 LAB
BASOPHILS # BLD AUTO: 0.07 10*3/MM3 (ref 0–0.2)
BASOPHILS NFR BLD AUTO: 1.9 % (ref 0–1.5)
DEPRECATED RDW RBC AUTO: 50.4 FL (ref 37–54)
EOSINOPHIL # BLD AUTO: 0.04 10*3/MM3 (ref 0–0.4)
EOSINOPHIL NFR BLD AUTO: 1.1 % (ref 0.3–6.2)
ERYTHROCYTE [DISTWIDTH] IN BLOOD BY AUTOMATED COUNT: 15.2 % (ref 12.3–15.4)
HCT VFR BLD AUTO: 39.6 % (ref 34–46.6)
HGB BLD-MCNC: 12.7 G/DL (ref 12–15.9)
IMM GRANULOCYTES # BLD AUTO: 0.12 10*3/MM3 (ref 0–0.05)
IMM GRANULOCYTES NFR BLD AUTO: 3.2 % (ref 0–0.5)
LYMPHOCYTES # BLD AUTO: 1.42 10*3/MM3 (ref 0.7–3.1)
LYMPHOCYTES NFR BLD AUTO: 38 % (ref 19.6–45.3)
MCH RBC QN AUTO: 29.1 PG (ref 26.6–33)
MCHC RBC AUTO-ENTMCNC: 32.1 G/DL (ref 31.5–35.7)
MCV RBC AUTO: 90.8 FL (ref 79–97)
MONOCYTES # BLD AUTO: 0.74 10*3/MM3 (ref 0.1–0.9)
MONOCYTES NFR BLD AUTO: 19.8 % (ref 5–12)
NEUTROPHILS NFR BLD AUTO: 1.35 10*3/MM3 (ref 1.7–7)
NEUTROPHILS NFR BLD AUTO: 36 % (ref 42.7–76)
NRBC BLD AUTO-RTO: 0.8 /100 WBC (ref 0–0.2)
PLATELET # BLD AUTO: 230 10*3/MM3 (ref 140–450)
PMV BLD AUTO: 10.3 FL (ref 6–12)
RBC # BLD AUTO: 4.36 10*6/MM3 (ref 3.77–5.28)
WBC NRBC COR # BLD AUTO: 3.74 10*3/MM3 (ref 3.4–10.8)

## 2025-02-26 PROCEDURE — 85025 COMPLETE CBC W/AUTO DIFF WBC: CPT | Performed by: INTERNAL MEDICINE

## 2025-02-26 PROCEDURE — 36592 COLLECT BLOOD FROM PICC: CPT

## 2025-02-26 RX ORDER — SODIUM CHLORIDE 0.9 % (FLUSH) 0.9 %
10 SYRINGE (ML) INJECTION AS NEEDED
Status: DISCONTINUED | OUTPATIENT
Start: 2025-02-26 | End: 2025-02-26 | Stop reason: HOSPADM

## 2025-02-26 RX ORDER — SODIUM CHLORIDE 0.9 % (FLUSH) 0.9 %
10 SYRINGE (ML) INJECTION AS NEEDED
OUTPATIENT
Start: 2025-02-26

## 2025-02-26 NOTE — NURSING NOTE
Pt here for PICC line dressing change and wants to not continue treatment.  She reports feeling bad for one week with shortness of air, cough, pain, fatigue and no appetite and mouth and throat pain. Oxygen 88 % of room air.  Pt forgot oxygen today.  Oxygen placed at 2 lpm n/c with increase in saturation to 93%.  Blood drawn from port.  CBC stable.   Pt has laurent's mouthwash to help with mouth pain.  She is planning to see her pulmonologist after this appointment. Per Dr Vallejo, PICC line dc'd intact.  Vaseline gauze, 4X4 and tegaderm placed over old insertion site. Pt remained in office for thirty minutes.  No bleeding observed.  Pt discharged stable.

## 2025-03-12 ENCOUNTER — LAB (OUTPATIENT)
Dept: LAB | Facility: HOSPITAL | Age: 65
End: 2025-03-12
Payer: COMMERCIAL

## 2025-03-12 ENCOUNTER — APPOINTMENT (OUTPATIENT)
Dept: ONCOLOGY | Facility: HOSPITAL | Age: 65
End: 2025-03-12
Payer: COMMERCIAL

## 2025-03-12 ENCOUNTER — OFFICE VISIT (OUTPATIENT)
Dept: ONCOLOGY | Facility: CLINIC | Age: 65
End: 2025-03-12
Payer: COMMERCIAL

## 2025-03-12 VITALS
WEIGHT: 216.4 LBS | SYSTOLIC BLOOD PRESSURE: 108 MMHG | RESPIRATION RATE: 16 BRPM | HEIGHT: 70 IN | DIASTOLIC BLOOD PRESSURE: 74 MMHG | OXYGEN SATURATION: 96 % | TEMPERATURE: 97.3 F | BODY MASS INDEX: 30.98 KG/M2 | HEART RATE: 88 BPM

## 2025-03-12 DIAGNOSIS — C50.919 MALIGNANT NEOPLASM OF FEMALE BREAST, UNSPECIFIED ESTROGEN RECEPTOR STATUS, UNSPECIFIED LATERALITY, UNSPECIFIED SITE OF BREAST: ICD-10-CM

## 2025-03-12 DIAGNOSIS — Z79.899 HIGH RISK MEDICATION USE: Primary | ICD-10-CM

## 2025-03-12 DIAGNOSIS — Z79.899 HIGH RISK MEDICATION USE: ICD-10-CM

## 2025-03-12 LAB
ALBUMIN SERPL-MCNC: 3.7 G/DL (ref 3.5–5.2)
ALBUMIN/GLOB SERPL: 1.6 G/DL
ALP SERPL-CCNC: 95 U/L (ref 39–117)
ALT SERPL W P-5'-P-CCNC: 12 U/L (ref 1–33)
ANION GAP SERPL CALCULATED.3IONS-SCNC: 7.5 MMOL/L (ref 5–15)
AST SERPL-CCNC: 13 U/L (ref 1–32)
BASOPHILS # BLD AUTO: 0.04 10*3/MM3 (ref 0–0.2)
BASOPHILS NFR BLD AUTO: 0.5 % (ref 0–1.5)
BILIRUB SERPL-MCNC: 0.4 MG/DL (ref 0–1.2)
BUN SERPL-MCNC: 9 MG/DL (ref 8–23)
BUN/CREAT SERPL: 16.7 (ref 7–25)
CALCIUM SPEC-SCNC: 9.1 MG/DL (ref 8.6–10.5)
CHLORIDE SERPL-SCNC: 104 MMOL/L (ref 98–107)
CO2 SERPL-SCNC: 30.5 MMOL/L (ref 22–29)
CREAT SERPL-MCNC: 0.54 MG/DL (ref 0.57–1)
DEPRECATED RDW RBC AUTO: 53.2 FL (ref 37–54)
EGFRCR SERPLBLD CKD-EPI 2021: 103.6 ML/MIN/1.73
EOSINOPHIL # BLD AUTO: 0.06 10*3/MM3 (ref 0–0.4)
EOSINOPHIL NFR BLD AUTO: 0.7 % (ref 0.3–6.2)
ERYTHROCYTE [DISTWIDTH] IN BLOOD BY AUTOMATED COUNT: 16.3 % (ref 12.3–15.4)
GLOBULIN UR ELPH-MCNC: 2.3 GM/DL
GLUCOSE SERPL-MCNC: 136 MG/DL (ref 65–99)
HCT VFR BLD AUTO: 44.9 % (ref 34–46.6)
HGB BLD-MCNC: 14.1 G/DL (ref 12–15.9)
IMM GRANULOCYTES # BLD AUTO: 0.03 10*3/MM3 (ref 0–0.05)
IMM GRANULOCYTES NFR BLD AUTO: 0.3 % (ref 0–0.5)
LYMPHOCYTES # BLD AUTO: 1.76 10*3/MM3 (ref 0.7–3.1)
LYMPHOCYTES NFR BLD AUTO: 20 % (ref 19.6–45.3)
MCH RBC QN AUTO: 29.1 PG (ref 26.6–33)
MCHC RBC AUTO-ENTMCNC: 31.4 G/DL (ref 31.5–35.7)
MCV RBC AUTO: 92.6 FL (ref 79–97)
MONOCYTES # BLD AUTO: 0.77 10*3/MM3 (ref 0.1–0.9)
MONOCYTES NFR BLD AUTO: 8.7 % (ref 5–12)
NEUTROPHILS NFR BLD AUTO: 6.16 10*3/MM3 (ref 1.7–7)
NEUTROPHILS NFR BLD AUTO: 69.8 % (ref 42.7–76)
NRBC BLD AUTO-RTO: 0 /100 WBC (ref 0–0.2)
PLATELET # BLD AUTO: 277 10*3/MM3 (ref 140–450)
PMV BLD AUTO: 9.3 FL (ref 6–12)
POTASSIUM SERPL-SCNC: 4.3 MMOL/L (ref 3.5–5.2)
PROT SERPL-MCNC: 6 G/DL (ref 6–8.5)
RBC # BLD AUTO: 4.85 10*6/MM3 (ref 3.77–5.28)
SODIUM SERPL-SCNC: 142 MMOL/L (ref 136–145)
WBC NRBC COR # BLD AUTO: 8.82 10*3/MM3 (ref 3.4–10.8)

## 2025-03-12 PROCEDURE — 36415 COLL VENOUS BLD VENIPUNCTURE: CPT

## 2025-03-12 PROCEDURE — 85025 COMPLETE CBC W/AUTO DIFF WBC: CPT | Performed by: NURSE PRACTITIONER

## 2025-03-12 PROCEDURE — 80053 COMPREHEN METABOLIC PANEL: CPT | Performed by: NURSE PRACTITIONER

## 2025-03-12 NOTE — PROGRESS NOTES
Subjective     REASON FOR CONSULTATION:  breast cancer  Provide an opinion on any further workup or treatment                             REQUESTING PHYSICIAN:  Vincent    RECORDS OBTAINED:  Records of the patients history including those obtained from the referring provider were reviewed and summarized in detail.    HISTORY OF PRESENT ILLNESS:  The patient is a 64 y.o. year old female who is here for an opinion about the above issue.    History of Present Illness   This is a 64-year-old woman with COPD/chronic hypoxic respiratory failure on O2 2 L by nasal cannula, hyperlipidemia, hypertension, type 2 diabetes referred for adjuvant recommendations of breast cancer.  The patient had abnormal screening mammography on 9/11/2024 showing scattered fibroglandular densities and a focal asymmetry in the lateral right breast.  A follow-up diagnostic mammogram and ultrasound were performed 9/13/2024 showing at the 7 o'clock position of the right breast mass 1.1 x 1.1 x 0.8 cm in size.  An ultrasound biopsy was performed on 9/13/2024 showed invasive lobular carcinoma with pleomorphic features grade 2 (3+2+1) no lymph-vascular invasion or DCIS.  Tumor was negative for ER UT and HER2 1+ with Ki-67 5%.  Biopsy of the right axillary lymph node was negative.  Bilateral breast MRI 9/11/2024-scattered fibroglandular tissue, irregular mass inferior lateral right breast 7:00 11 cm from the nipple 1.0 x 1.0 x 0.8 cm and a questionable low right axillary lymph node.    She was taken to the operating room on 12/4/2024 and underwent a right lumpectomy sentinel lymph node procedure.  Pathology showed invasive lobular carcinoma grade 2 measuring 1.3 cm with margins negative for invasive carcinoma.  3 axillary lymph nodes were negative for invasive carcinoma.    The patient's sister and mother had breast cancer. [employer] 9 gene genetic panel was performed and negative.    She initiated Taxotere Cytoxan 1/8/2025 and completed 3 cycles and  thereafter decided to stop treatment due to side effects.    She returns today for toxicity check reporting she became more mentally foggy after the third cycle and found herself being very confused with even doing things such as cooking.  She was weak and concerned that she would not be able to return back to work when this is all finished.  She has had glucose issues and mouth sores with hoarse voice.  She also had some neuropathy symptoms though those have improved.  She reports the hoarseness is improved at this point.  She denies any recent fevers, chills, or new pain.    She has seen radiation oncology at [place] [facility] and is set to begin in 4 weeks.    Past Medical History:   Diagnosis Date    Anemia     Anxiety     Arthritis     Asthma 10/01    Rsv    Breast cancer, right     Chronic hypoxic respiratory failure     2023, 2024 COVID AND RSV - FOLLOWED BY DR SAVAGE    COPD (chronic obstructive pulmonary disease)     Depression     Diabetes mellitus     Fissure, anal     GERD (gastroesophageal reflux disease)     Hemorrhoid     History of anemia     History of COVID-19     COVID PNEUMONIA  9/2023, 10/2024    Hyperlipidemia     Hypertension     On home O2     2L NC    Psoriasis     Pulmonary nodule     4 MM LEFT LOWER LOBE NONCALCIFIED PER CT REPORT    RSV infection 10/01/2023    ADMITTED TO [facility]    Sleep apnea     Urinary and fecal incontinence         Past Surgical History:   Procedure Laterality Date    BREAST LUMPECTOMY WITH SENTINEL NODE BIOPSY Right 12/04/2024    Procedure: right breast Kathrine guided lumpectomy with sentinel lymph node biopsy;  Surgeon: Shi Kaur MD;  Location: [facility];  Service: General;  Laterality: Right;    BREAST SURGERY Bilateral 12/04/2024    Procedure: RIGHT - ONCOPLASTIC RECONSTRUCTION LEFT - BREAST REDUCTION FOR SYMMETRY;  Surgeon: Flaquito Yoder MD;  Location: [facility] OR;  Service: Plastics;  Laterality: Bilateral;    COLONOSCOPY N/A     HEMORRHOIDECTOMY   2018    HYSTERECTOMY N/A     OTHER SURGICAL HISTORY      LABIAL SURGERY TO REPLACE URETHRA D/T COMPLICATIOSN FROM PREVIOUS SURGERIES AND MESH    PELVIC FLOOR REPAIR N/A     RECTOCELE REPAIR N/A     TOTAL HIP ARTHROPLASTY Left 04/2023    TUBAL ABDOMINAL LIGATION      URETHRA SURGERY      X4        Current Outpatient Medications on File Prior to Visit   Medication Sig Dispense Refill    albuterol (PROVENTIL) (2.5 MG/3ML) 0.083% nebulizer solution Take 2.5 mg by nebulization 3 (Three) Times a Day.      Ascorbic Acid (Vitamin C) 500 MG capsule Take 500 mg by mouth Daily. HELD FOR OR      aspirin 81 MG EC tablet Take 1 tablet by mouth Daily. HELD FOR OR      atorvastatin (LIPITOR) 40 MG tablet Take 1 tablet by mouth Daily.      B Complex Vitamins (vitamin b complex) capsule capsule Take 1 capsule by mouth Daily. HELD FOR OR      Cholecalciferol 25 MCG (1000 UT) tablet Take 1 tablet by mouth Daily. HELD FOR OR      Continuous Blood Gluc Sensor (Dexcom G6 Sensor)       Continuous Blood Gluc Transmit (Dexcom G6 Transmitter) misc       dexAMETHasone (DECADRON) 4 MG tablet Take 2 tablets oral twice a day for 3 consecutive days beginning the day before chemotherapy and continue for 6 doses. 12 tablet 3    Diphenhydramine-Aluminum-Magnesium-Simethicone-Lidocaine-Nystatin Swish and swallow 5 mL Every 4 (Four) Hours As Needed (mucositis). Recipe is Benadryl Elixir 60cc Maalox 200 cc Viscous Lidocaine 30cc Nystatin 120cc 410 mL 1    glipizide (GLUCOTROL XL) 2.5 MG 24 hr tablet Take 1 tablet by mouth Daily.      hydrOXYzine pamoate (VISTARIL) 25 MG capsule Take 1 capsule by mouth 3 (Three) Times a Day As Needed for Itching.      Insulin Glargine (BASAGLAR KWIKPEN SC) Inject 60 Units under the skin into the appropriate area as directed Daily.      Insulin Lispro (humaLOG) 100 UNIT/ML injection Inject 4-16 Units under the skin into the appropriate area as directed As Needed for High Blood Sugar. Sliding scale takes 4 -16 units:  141-180:4 units, 181-220: 6 units, 221-260: 8 units, 261-300: 10 units, 301-350: 12 units, 350-400: 14 units, greater 400: 16 units    Pt takes if blood glucose is greater than 200      ipratropium-albuterol (DUO-NEB) 0.5-2.5 mg/3 ml nebulizer Take 3 mL by nebulization Every 4 (Four) Hours As Needed for Wheezing. 90 mL 1    lisinopril (PRINIVIL,ZESTRIL) 10 MG tablet Take 1 tablet by mouth Daily.      Loratadine (Claritin) 10 MG capsule Take  by mouth.      nicotine (NICODERM CQ) 21 MG/24HR patch Place 1 patch on the skin as directed by provider Daily.      ondansetron (ZOFRAN) 8 MG tablet Take 1 tablet by mouth 3 (Three) Times a Day As Needed for Nausea or Vomiting. 30 tablet 5    Trelegy Ellipta 100-62.5-25 MCG/ACT inhaler Inhale 1 puff Daily.       No current facility-administered medications on file prior to visit.        ALLERGIES:    Allergies   Allergen Reactions    Erythromycin Rash     ALL MYCINS BREAK HER OUT    Nitrofurantoin Headache and Rash     CAUSES FEVER, HEADACHES    Codeine Headache     headaches        Social History     Socioeconomic History    Marital status:     Number of children: 2   Tobacco Use    Smoking status: Former     Current packs/day: 0.00     Average packs/day: 1 pack/day for 49.7 years (49.7 ttl pk-yrs)     Types: Cigarettes     Start date: 1974     Quit date: 10/1/2023     Years since quittin.4     Passive exposure: Past    Smokeless tobacco: Never    Tobacco comments:     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   Vaping Use    Vaping status: Never Used   Substance and Sexual Activity    Alcohol use: Never    Drug use: Never    Sexual activity: Not Currently     Partners: Male     Birth control/protection: Post-menopausal, Hysterectomy        Family History   Problem Relation Age of Onset    Heart disease Mother     Hypertension Mother     Diabetes Mother     Cancer Mother         Bladder cancer Breast Cancer mastectomy    Breast cancer Mother     Asthma Mother      "Dementia Mother     Hyperlipidemia Father     Cancer Father     Diabetes Father         [facility]    Colon cancer Father     Lung cancer Father     Alcohol abuse Sister     Cancer Sister         breast, cancer double mastectomy    Cancer Paternal Aunt     Asthma Maternal Grandmother     Alcohol abuse Maternal Grandfather     Cancer Paternal Grandmother     Malig Hyperthermia Neg Hx         Review of Systems   Constitutional:  Positive for fatigue.   HENT:  Positive for congestion, postnasal drip and rhinorrhea.    Respiratory:  Negative for cough, shortness of breath and wheezing.    Cardiovascular:  Negative for leg swelling.   Gastrointestinal: Negative.    Genitourinary: Negative.    Musculoskeletal: Negative.    Skin: Negative.    Neurological: Negative.    Hematological: Negative.    Psychiatric/Behavioral:  The patient is nervous/anxious.         Objective     Vitals:    25 0756   BP: 108/74   Pulse: 88   Resp: 16   Temp: 97.3 °F (36.3 °C)   TempSrc: Infrared   SpO2: 96%  Comment: on oxygen   Weight: 98.2 kg (216 lb 6.4 oz)   Height: 177.8 cm (70\")   PainSc: 0-No pain           3/12/2025     7:59 AM   Current Status   ECOG score 0       Physical Exam    CONSTITUTIONAL: pleasant well-developed adult woman  HEENT: no icterus, EOMI, no thrush, moist membranes, postnasal drip noted  LYMPH: no cervical or supraclavicular lad  CV: RRR, S1S2, no murmur  RESP: cta bilat, no wheezing, 02 by NC  BREAST:  bilateral breast scars (reduction/reconstruction)  GI: soft, nontender, no splenomegaly, +BS  MUSC: no edema, normal gait  NEURO: alert and oriented x3, normal strength  PSYCH: normal mood and affect    RECENT LABS:  Hematology WBC   Date Value Ref Range Status   2025 8.82 3.40 - 10.80 10*3/mm3 Final     RBC   Date Value Ref Range Status   2025 4.85 3.77 - 5.28 10*6/mm3 Final     Hemoglobin   Date Value Ref Range Status   2025 14.1 12.0 - 15.9 " g/dL Final     Hematocrit   Date Value Ref Range Status   03/12/2025 44.9 34.0 - 46.6 % Final     Platelets   Date Value Ref Range Status   03/12/2025 277 140 - 450 10*3/mm3 Final        Lab Results   Component Value Date    GLUCOSE 175 (H) 02/19/2025    BUN 11 02/19/2025    CREATININE 0.56 (L) 02/19/2025     02/19/2025    K 3.9 02/19/2025     02/19/2025    CALCIUM 9.1 02/19/2025    PROTEINTOT 6.0 02/19/2025    ALBUMIN 3.7 02/19/2025    ALT 12 02/19/2025    AST 13 02/19/2025    ALKPHOS 103 02/19/2025    BILITOT 0.3 02/19/2025    GLOB 2.3 02/19/2025    AGRATIO 1.6 02/19/2025    BCR 19.6 02/19/2025    ANIONGAP 8.2 02/19/2025    EGFR 102.7 02/19/2025     Breast MRI:IMPRESSION:   1. Suspicious right breast mass for which right diagnostic mammogram and ultrasound are recommended.   2.  Questionable prominent right axillary lymph node for which right diagnostic axillary ultrasound is recommended.   3.  No MRI evidence of left breast malignancy.     CT chest 4/8/2024-  IMPRESSION:  Impression:     1. Stable 4 mm noncalcified nodule within the left lower lobe. No other acute cardiopulmonary disease. Repeat noncontrast CT scan of the chest would be recommended in 12 months to document continued stability.       Assessment & Plan   *[id number] lobular cancer of the right breast triple negative Ki-67 5%  abnormal screening mammography on 9/11/2024 showing scattered fibroglandular densities and a focal asymmetry in the lateral right breast  diagnostic mammogram and ultrasound were performed 9/13/2024 showing at the 7 o'clock position of the right breast mass 1.1 x 1.1 x 0.8 cm in size.    ultrasound biopsy was performed on 9/13/2024 showed invasive lobular carcinoma with pleomorphic features grade 2 (3+2+1) no lymph-vascular invasion or DCIS.  Tumor was negative for ER KS and HER2 1+ with Ki-67 5%.  Biopsy of the right axillary lymph node was negative  Bilateral breast MRI 9/11/2024-scattered fibroglandular tissue,  irregular mass inferior lateral right breast 7:00 11 cm from the nipple 1.0 x 1.0 x 0.8 cm and a questionable low right axillary lymph node.  12/4/2024 - right lumpectomy sentinel lymph node procedure.  Pathology showed invasive lobular carcinoma grade 2 measuring 1.3 cm with margins negative for invasive carcinoma.  3 axillary lymph nodes were negative for invasive carcinoma.  Inv[employer]ae 9 gene genetic panel was performed and negative.  Initiation of TC on 1/8/25  ER visit with normal ANC and mucositis.  She was discharged.  Following day she called into the office and was given Nystatin swish and has had improvement. She also had struggled with diarrhea however once given the protocol had improvement in symptoms.  She did see PCP and has started insulin to help with her elevated glucose.  We also reduced her home dexamethasone to 4mg BID the day before, day of and day after Taxotere.  PCP also prescribed hydroxyzine with improvement in her anxiety.  1/29/2025 Returns for C2 TC with Neulasta.  Feeling better with medication adjustments as above. She is having symptoms of sinus infection and has a history of this. Will proceed with Cefdinir for her sinus infection and proceed with treatment today.    2/19/2025 returns today for cycle 3 TC with Neulasta.  Patient reports has been hoarse the past 3 weeks but states over the past couple of days it seems to be improving and her voice is stronger today.  Will continue to monitor this.  She reported intermittent worsening of neuropathy in her feet but this is not consistent will continue to monitor.  She also noticed decreased  strength at times recently though not noted on exam today.  She is icing her hands for the Taxotere infusion and denies any neuropathy.  Will proceed at same dose as previous.  Patient returned 2/26/2025 and stated that she was done with chemotherapy and did not want to proceed with any additional treatments.  At that time her PICC line was  discontinued.  3/12/2025 for follow-up reports that she is improving.  She is slowly regaining some energy.  She had neuropathy and it is now improved.  She does have some changes in her mouth but states the hoarseness is improved.  She still is mentally foggy but it is slowly dissipating.  She is already arranged radiation through [place] [facility] and expects to begin that in approximately 4 weeks.    *Lung nodule under surveillance by pulmonary medicine    *Comorbidities-COPD with chronic hypoxic respiratory failure on O2 2 L by nasal cannula, diabetes, hypertension, hyperlipidemia    *Bilateral lower extremity edema-patient reduced her lisinopril by half dose and thereafter self stopped.  Blood pressures currently maintained.  Encouraged follow-up with PCP.    *COPD: Controlled today.  Continues on home oxygen.    Oncology plan/recommendations:  Patient completed 3 out of 4 planned cycles of TC chemotherapy  Radiation will be under the direction of [facility] and patient anticipates to start in 4 to 5 weeks  Follow-up with PCP regarding lisinopril dose change  Follow-up in 10 to 12 weeks with Dr. Vallejo repeat CBC and CMP.

## 2025-04-04 ENCOUNTER — TRANSCRIBE ORDERS (OUTPATIENT)
Dept: ADMINISTRATIVE | Facility: HOSPITAL | Age: 65
End: 2025-04-04
Payer: COMMERCIAL

## 2025-04-04 DIAGNOSIS — R60.0 EDEMA OF FOOT: Primary | ICD-10-CM

## 2025-04-08 ENCOUNTER — HOSPITAL ENCOUNTER (OUTPATIENT)
Dept: ULTRASOUND IMAGING | Facility: HOSPITAL | Age: 65
Discharge: HOME | End: 2025-04-08
Admitting: PHYSICIAN ASSISTANT
Payer: COMMERCIAL

## 2025-04-08 DIAGNOSIS — R60.0 EDEMA OF FOOT: ICD-10-CM

## 2025-04-08 PROCEDURE — 93971 EXTREMITY STUDY: CPT

## 2025-04-18 ENCOUNTER — HOSPITAL ENCOUNTER (OUTPATIENT)
Dept: CT IMAGING | Facility: HOSPITAL | Age: 65
Discharge: HOME | End: 2025-04-18
Payer: COMMERCIAL

## 2025-05-07 ENCOUNTER — TRANSCRIBE ORDERS (OUTPATIENT)
Dept: ADMINISTRATIVE | Facility: HOSPITAL | Age: 65
End: 2025-05-07
Payer: COMMERCIAL

## 2025-05-07 DIAGNOSIS — R91.1 LUNG NODULE: Primary | ICD-10-CM

## 2025-05-14 NOTE — PROGRESS NOTES
Subjective     REASON FOR CONSULTATION:  breast cancer  Provide an opinion on any further workup or treatment                             REQUESTING PHYSICIAN:  Vincent    RECORDS OBTAINED:  Records of the patients history including those obtained from the referring provider were reviewed and summarized in detail.    HISTORY OF PRESENT ILLNESS:  The patient is a 64 y.o. year old female who is here for an opinion about the above issue.    History of Present Illness   This is a 64-year-old woman with COPD/chronic hypoxic respiratory failure on O2 2 L by nasal cannula, hyperlipidemia, hypertension, type 2 diabetes referred for adjuvant recommendations of breast cancer.  The patient had abnormal screening mammography on 9/11/2024 showing scattered fibroglandular densities and a focal asymmetry in the lateral right breast.  A follow-up diagnostic mammogram and ultrasound were performed 9/13/2024 showing at the 7 o'clock position of the right breast mass 1.1 x 1.1 x 0.8 cm in size.  An ultrasound biopsy was performed on 9/13/2024 showed invasive lobular carcinoma with pleomorphic features grade 2 (3+2+1) no lymph-vascular invasion or DCIS.  Tumor was negative for ER WA and HER2 1+ with Ki-67 5%.  Biopsy of the right axillary lymph node was negative.  Bilateral breast MRI 9/11/2024-scattered fibroglandular tissue, irregular mass inferior lateral right breast 7:00 11 cm from the nipple 1.0 x 1.0 x 0.8 cm and a questionable low right axillary lymph node.    She was taken to the operating room on 12/4/2024 and underwent a right lumpectomy sentinel lymph node procedure.  Pathology showed invasive lobular carcinoma grade 2 measuring 1.3 cm with margins negative for invasive carcinoma.  3 axillary lymph nodes were negative for invasive carcinoma.    The patient's sister and mother had breast cancer [employer] 9 gene genetic panel was performed and negative.    The completed 3 cycles of adjuvant TC chemotherapy (fourth cycle not  given because of side effects and patient choice) and radiation therapy at [place].  She is back to work and feeling well.    Past Medical History:   Diagnosis Date    Anemia     Anxiety     Arthritis     Asthma 10/01    Rsv    Breast cancer, right     Chronic hypoxic respiratory failure     2023, 2024 COVID AND RSV - FOLLOWED BY DR SAVAGE    COPD (chronic obstructive pulmonary disease)     Depression     Diabetes mellitus     Fissure, anal     GERD (gastroesophageal reflux disease)     Hemorrhoid     History of anemia     History of COVID-19     COVID PNEUMONIA  9/2023, 10/2024    Hyperlipidemia     Hypertension     On home O2     2L NC    Psoriasis     Pulmonary nodule     4 MM LEFT LOWER LOBE NONCALCIFIED PER CT REPORT    RSV infection 10/01/2023    ADMITTED TO [facility]    Sleep apnea     Urinary and fecal incontinence         Past Surgical History:   Procedure Laterality Date    BREAST LUMPECTOMY WITH SENTINEL NODE BIOPSY Right 12/04/2024    Procedure: right breast Kathrine guided lumpectomy with sentinel lymph node biopsy;  Surgeon: Shi Kaur MD;  Location: [facility];  Service: General;  Laterality: Right;    BREAST SURGERY Bilateral 12/04/2024    Procedure: RIGHT - ONCOPLASTIC RECONSTRUCTION LEFT - BREAST REDUCTION FOR SYMMETRY;  Surgeon: Flaquito Yoder MD;  Location: [facility];  Service: Plastics;  Laterality: Bilateral;    COLONOSCOPY N/A     HEMORRHOIDECTOMY  2018    HYSTERECTOMY N/A     OTHER SURGICAL HISTORY      LABIAL SURGERY TO REPLACE URETHRA D/T COMPLICATIOSN FROM PREVIOUS SURGERIES AND MESH    PELVIC FLOOR REPAIR N/A     RECTOCELE REPAIR N/A     TOTAL HIP ARTHROPLASTY Left 04/2023    TUBAL ABDOMINAL LIGATION      URETHRA SURGERY      X4        Current Outpatient Medications on File Prior to Visit   Medication Sig Dispense Refill    albuterol (PROVENTIL) (2.5 MG/3ML) 0.083% nebulizer solution Take 2.5 mg by nebulization 3 (Three) Times a Day.      Ascorbic Acid (Vitamin C) 500  MG capsule Take 500 mg by mouth Daily. HELD FOR OR      atorvastatin (LIPITOR) 40 MG tablet Take 1 tablet by mouth Daily.      B Complex Vitamins (vitamin b complex) capsule capsule Take 1 capsule by mouth Daily. HELD FOR OR      Cholecalciferol 25 MCG (1000 UT) tablet Take 1 tablet by mouth Daily. HELD FOR OR      Continuous Blood Gluc Sensor (Dexcom G6 Sensor)       Continuous Blood Gluc Transmit (Dexcom G6 Transmitter) misc       glipizide (GLUCOTROL XL) 2.5 MG 24 hr tablet Take 1 tablet by mouth Daily.      Insulin Glargine (BASAGLAR KWIKPEN SC) Inject 60 Units under the skin into the appropriate area as directed Daily.      ipratropium-albuterol (DUO-NEB) 0.5-2.5 mg/3 ml nebulizer Take 3 mL by nebulization Every 4 (Four) Hours As Needed for Wheezing. 90 mL 1    nicotine (NICODERM CQ) 21 MG/24HR patch Place 1 patch on the skin as directed by provider Daily.      Trelegy Ellipta 100-62.5-25 MCG/ACT inhaler Inhale 1 puff Daily.      [DISCONTINUED] aspirin 81 MG EC tablet Take 1 tablet by mouth Daily. HELD FOR OR (Patient not taking: Reported on 3/12/2025)      [DISCONTINUED] dexAMETHasone (DECADRON) 4 MG tablet Take 2 tablets oral twice a day for 3 consecutive days beginning the day before chemotherapy and continue for 6 doses. (Patient not taking: Reported on 3/12/2025) 12 tablet 3    [DISCONTINUED] Diphenhydramine-Aluminum-Magnesium-Simethicone-Lidocaine-Nystatin Swish and swallow 5 mL Every 4 (Four) Hours As Needed (mucositis). Recipe is Benadryl Elixir 60cc Maalox 200 cc Viscous Lidocaine 30cc Nystatin 120cc 410 mL 1    [DISCONTINUED] hydrOXYzine pamoate (VISTARIL) 25 MG capsule Take 1 capsule by mouth 3 (Three) Times a Day As Needed for Itching. (Patient not taking: Reported on 3/12/2025)      [DISCONTINUED] Insulin Lispro (humaLOG) 100 UNIT/ML injection Inject 4-16 Units under the skin into the appropriate area as directed As Needed for High Blood Sugar. Sliding scale takes 4 -16 units: 141-180:4 units,  181-220: 6 units, 221-260: 8 units, 261-300: 10 units, 301-350: 12 units, 350-400: 14 units, greater 400: 16 units    Pt takes if blood glucose is greater than 200      [DISCONTINUED] lisinopril (PRINIVIL,ZESTRIL) 10 MG tablet Take 1 tablet by mouth Daily. (Patient not taking: Reported on 3/12/2025)      [DISCONTINUED] Loratadine (Claritin) 10 MG capsule Take  by mouth. (Patient not taking: Reported on 3/12/2025)      [DISCONTINUED] ondansetron (ZOFRAN) 8 MG tablet Take 1 tablet by mouth 3 (Three) Times a Day As Needed for Nausea or Vomiting. (Patient not taking: Reported on 3/12/2025) 30 tablet 5     No current facility-administered medications on file prior to visit.        ALLERGIES:    Allergies   Allergen Reactions    Erythromycin Rash     ALL MYCINS BREAK HER OUT    Nitrofurantoin Headache and Rash     CAUSES FEVER, HEADACHES    Codeine Headache     headaches        Social History     Socioeconomic History    Marital status:     Number of children: 2   Tobacco Use    Smoking status: Former     Current packs/day: 0.00     Average packs/day: 1 pack/day for 49.7 years (49.7 ttl pk-yrs)     Types: Cigarettes     Start date: 1974     Quit date: 10/1/2023     Years since quittin.6     Passive exposure: Past    Smokeless tobacco: Never    Tobacco comments:     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   Vaping Use    Vaping status: Never Used   Substance and Sexual Activity    Alcohol use: Never    Drug use: Never    Sexual activity: Not Currently     Partners: Male     Birth control/protection: Post-menopausal, Hysterectomy        Family History   Problem Relation Age of Onset    Heart disease Mother     Hypertension Mother     Diabetes Mother     Cancer Mother         Bladder cancer Breast Cancer mastectomy    Breast cancer Mother     Asthma Mother     Dementia Mother     Hyperlipidemia Father     Cancer Father     Diabetes Father         [facility]     "Colon cancer Father     Lung cancer Father     Alcohol abuse Sister     Cancer Sister         breast, cancer double mastectomy    Cancer Paternal Aunt     Asthma Maternal Grandmother     Alcohol abuse Maternal Grandfather     Cancer Paternal Grandmother     Malig Hyperthermia Neg Hx         Review of Systems   Constitutional: Negative.    HENT: Negative.     Respiratory:  Negative for shortness of breath.    Cardiovascular: Negative.    Gastrointestinal: Negative.    Genitourinary: Negative.    Musculoskeletal: Negative.    Skin: Negative.    Neurological: Negative.    Hematological: Negative.    Psychiatric/Behavioral: Negative.          Objective     Vitals:    05/20/25 1310   BP: 134/69   Pulse: 80   Resp: 16   Temp: 97.3 °F (36.3 °C)   TempSrc: Infrared   SpO2: 97%   Weight: 98.4 kg (217 lb)   Height: 177.8 cm (70\")   PainSc: 0-No pain             5/20/2025     1:34 PM   Current Status   ECOG score 0       Physical Exam    CONSTITUTIONAL: pleasant well-developed adult woman  HEENT: no icterus, no thrush, moist membranes  LYMPH: no cervical or supraclavicular lad  CV: RRR, S1S2, no murmur  RESP: cta bilat, no wheezing, no rales, 02 by NC  BREAST: Deferred  GI: soft, nontender, no splenomegaly, +BS  MUSC: no edema, normal gait  NEURO: alert and oriented x3, normal strength  PSYCH: normal mood and affect    RECENT LABS:  Hematology WBC   Date Value Ref Range Status   05/20/2025 6.21 3.40 - 10.80 10*3/mm3 Final     RBC   Date Value Ref Range Status   05/20/2025 5.44 (H) 3.77 - 5.28 10*6/mm3 Final     Hemoglobin   Date Value Ref Range Status   05/20/2025 16.0 (H) 12.0 - 15.9 g/dL Final     Hematocrit   Date Value Ref Range Status   05/20/2025 50.4 (H) 34.0 - 46.6 % Final     Platelets   Date Value Ref Range Status   05/20/2025 275 140 - 450 10*3/mm3 Final        Lab Results   Component Value Date    GLUCOSE 136 (H) 03/12/2025    BUN 9 03/12/2025    CREATININE 0.54 (L) 03/12/2025     03/12/2025    K 4.3 " 03/12/2025     03/12/2025    CALCIUM 9.1 03/12/2025    PROTEINTOT 6.0 03/12/2025    ALBUMIN 3.7 03/12/2025    ALT 12 03/12/2025    AST 13 03/12/2025    ALKPHOS 95 03/12/2025    BILITOT 0.4 03/12/2025    GLOB 2.3 03/12/2025    AGRATIO 1.6 03/12/2025    BCR 16.7 03/12/2025    ANIONGAP 7.5 03/12/2025    EGFR 103.6 03/12/2025     Breast MRI:IMPRESSION:   1. Suspicious right breast mass for which right diagnostic mammogram and ultrasound are recommended.   2.  Questionable prominent right axillary lymph node for which right diagnostic axillary ultrasound is recommended.   3.  No MRI evidence of left breast malignancy.     CT chest 4/8/2024-  IMPRESSION:  Impression:     1. Stable 4 mm noncalcified nodule within the left lower lobe. No other acute cardiopulmonary disease. Repeat noncontrast CT scan of the chest would be recommended in 12 months to document continued stability.       Assessment & Plan   *[id number] lobular cancer of the right breast triple negative Ki-67 5%  abnormal screening mammography on 9/11/2024 showing scattered fibroglandular densities and a focal asymmetry in the lateral right breast  diagnostic mammogram and ultrasound were performed 9/13/2024 showing at the 7 o'clock position of the right breast mass 1.1 x 1.1 x 0.8 cm in size.    ultrasound biopsy was performed on 9/13/2024 showed invasive lobular carcinoma with pleomorphic features grade 2 (3+2+1) no lymph-vascular invasion or DCIS.  Tumor was negative for ER DE and HER2 1+ with Ki-67 5%.  Biopsy of the right axillary lymph node was negative  Bilateral breast MRI 9/11/2024-scattered fibroglandular tissue, irregular mass inferior lateral right breast 7:00 11 cm from the nipple 1.0 x 1.0 x 0.8 cm and a questionable low right axillary lymph node.  12/4/2024 - right lumpectomy sentinel lymph node procedure.  Pathology showed invasive lobular carcinoma grade 2 measuring 1.3 cm with margins negative for invasive carcinoma.  3 axillary lymph  nodes were negative for invasive carcinoma.  Inv[employer]ae 9 gene genetic panel was performed and negative.  Completed 3 cycles of adjuvant TC chemotherapy (final cycle not given side effects and patient choice) 2/19/2025 followed by radiation therapy    *Lung nodule under surveillance by pulmonary medicine    *Comorbidities-COPD with chronic hypoxic respiratory failure on O2 2 L by nasal cannula, diabetes, hypertension, hyperlipidemia    Oncology plan/recommendations:  3 to 4-month follow-up CBC CMP exam MD/NP

## 2025-05-15 ENCOUNTER — HOSPITAL ENCOUNTER (OUTPATIENT)
Dept: OCCUPATIONAL THERAPY | Facility: HOSPITAL | Age: 65
Setting detail: THERAPIES SERIES
Discharge: HOME | End: 2025-05-15
Payer: COMMERCIAL

## 2025-05-15 DIAGNOSIS — C50.919 MALIGNANT NEOPLASM OF FEMALE BREAST, UNSPECIFIED ESTROGEN RECEPTOR STATUS, UNSPECIFIED LATERALITY, UNSPECIFIED SITE OF BREAST: Primary | ICD-10-CM

## 2025-05-15 PROCEDURE — 97535 SELF CARE MNGMENT TRAINING: CPT

## 2025-05-15 NOTE — THERAPY TREATMENT NOTE
Outpatient Occupational Therapy Lymphedema Treatment Note  [facility]     Patient Name: Magda Nath  : 1960  MRN: [id number]  Today's Date: 5/15/2025      Visit Date: 05/15/2025    Patient Active Problem List   Diagnosis    Gastroesophageal reflux disease without esophagitis    Type 2 diabetes mellitus without complication    Obstructive sleep apnea, adult    COPD exacerbation    Acute hypoxic respiratory failure    External hemorrhoids    Internal hemorrhoids    Malignant neoplasm of female breast    Fitting and adjustment of vascular catheter        Past Medical History:   Diagnosis Date    Anemia     Anxiety     Arthritis     Asthma 10/01    Rsv    Breast cancer, right     Chronic hypoxic respiratory failure     ,  COVID AND RSV - FOLLOWED BY DR SAVAGE    COPD (chronic obstructive pulmonary disease)     Depression     Diabetes mellitus     Fissure, anal     GERD (gastroesophageal reflux disease)     Hemorrhoid     History of anemia     History of COVID-19     COVID PNEUMONIA  2023, 10/2024    Hyperlipidemia     Hypertension     On home O2     2L NC    Psoriasis     Pulmonary nodule     4 MM LEFT LOWER LOBE NONCALCIFIED PER CT REPORT    RSV infection 10/01/2023    [facility]    Sleep apnea     Urinary and fecal incontinence         Past Surgical History:   Procedure Laterality Date    BREAST LUMPECTOMY WITH SENTINEL NODE BIOPSY Right 2024    Procedure: right breast Kathrine guided lumpectomy with sentinel lymph node biopsy;  Surgeon: Shi Kaur MD;  Location: [facility];  Service: General;  Laterality: Right;    BREAST SURGERY Bilateral 2024    Procedure: RIGHT - ONCOPLASTIC RECONSTRUCTION LEFT - BREAST REDUCTION FOR SYMMETRY;  Surgeon: Flaquito Yoder MD;  Location: [facility];  Service: Plastics;  Laterality: Bilateral;    COLONOSCOPY N/A     HEMORRHOIDECTOMY  2018    HYSTERECTOMY N/A     OTHER SURGICAL HISTORY      LABIAL SURGERY TO REPLACE  URETHRA D/T COMPLICATIOSN FROM PREVIOUS SURGERIES AND MESH    PELVIC FLOOR REPAIR N/A     RECTOCELE REPAIR N/A     TOTAL HIP ARTHROPLASTY Left 04/2023    TUBAL ABDOMINAL LIGATION      URETHRA SURGERY      X4         Visit Dx:      ICD-10-CM ICD-9-CM   1. Malignant neoplasm of female breast, unspecified estrogen receptor status, unspecified laterality, unspecified site of breast  C50.919 174.9        Lymphedema       Row Name 05/15/25 0800             Subjective Pain    Able to rate subjective pain? yes  -JJ      Pre-Treatment Pain Level 0  -JJ      Post-Treatment Pain Level 0  -JJ         Subjective    Subjective Comments pt states no issues with edema in R UE, treatmetns are all complete and only complaint is swelling in R ankle which has been going on for several years, especially when on her feet a lot  -JJ         Lymphedema Assessment    Lymphedema Classification RUE:;at risk/stage 0  -JJ      Lymphedema Cancer Related Sx right;lumpectomy;reconstructive;sentinel node biopsy  -JJ      Lymph Nodes Removed # 3  -JJ      Positive Lymph Nodes # 0  -JJ      Stage of Cancer Stage I  -JJ         Posture/Observations    Posture- WNL Posture is WNL  -JJ         Compression/Skin Care    Compression/Skin Care Comments pt purchased R UE compression garment for radiation treatments. no current symptoms. pt is purchasing thigh high compression garments for B LE lymphedema  -JJ         L-Dex Bioimpedence Screening    L-Dex Measurement Extremity RUE  -JJ      L-Dex Patient Position Standing  -JJ      L-Dex UE Dominate Side Right  -JJ      L-Dex UE At Risk Side Right  -JJ      L-Dex UE Pre Surgical Value No  -JJ      L-Dex UE Score -1.2  -JJ                User Key  (r) = Recorded By, (t) = Taken By, (c) = Cosigned By      Initials Name Provider Type    Elise Lew, OTR Occupational Therapist                             OT Assessment/Plan       Row Name 05/15/25 4684          OT Assessment    Assessment Comments pt  with bioimpedence score today of -1.2 well within normal limits. pt states chemo, surgery and raditiation treatments are all completed. states no signs or symptoms of lymphedema in R UE. pt does co chronic B LE edema and is ordering thigh high compression garments for these symptoms. pt educated on care of skin and monitoring for symptoms in R UE  -JEYSON        OT Plan    OT Plan Comments pt to contact clinic with any further questions and concerns  -               User Key  (r) = Recorded By, (t) = Taken By, (c) = Cosigned By      Initials Name Provider Type    Elise Lew, AVNI Occupational Therapist                              Therapy Education  Given: HEP, Symptoms/condition management, Edema management  Program: Reinforced  How Provided: Verbal, Demonstration  Provided to: Patient  Level of Understanding: Verbalized                Time Calculation:   OT Start Time: 0800  OT Stop Time: 0838  OT Time Calculation (min): 38 min     Therapy Charges for Today       Code Description Service Date Service Provider Modifiers Qty    [id number] HC OT SELF CARE/MGMT/TRAIN EA 15 MIN 5/15/2025 Elise Torres OTR GO 1                        AVNI Arzola  5/15/2025

## 2025-05-20 ENCOUNTER — LAB (OUTPATIENT)
Dept: LAB | Facility: HOSPITAL | Age: 65
End: 2025-05-20
Payer: COMMERCIAL

## 2025-05-20 ENCOUNTER — OFFICE VISIT (OUTPATIENT)
Dept: ONCOLOGY | Facility: CLINIC | Age: 65
End: 2025-05-20
Payer: COMMERCIAL

## 2025-05-20 VITALS
RESPIRATION RATE: 16 BRPM | TEMPERATURE: 97.3 F | WEIGHT: 217 LBS | BODY MASS INDEX: 31.07 KG/M2 | SYSTOLIC BLOOD PRESSURE: 134 MMHG | HEIGHT: 70 IN | HEART RATE: 80 BPM | DIASTOLIC BLOOD PRESSURE: 69 MMHG | OXYGEN SATURATION: 97 %

## 2025-05-20 DIAGNOSIS — C50.919 MALIGNANT NEOPLASM OF FEMALE BREAST, UNSPECIFIED ESTROGEN RECEPTOR STATUS, UNSPECIFIED LATERALITY, UNSPECIFIED SITE OF BREAST: ICD-10-CM

## 2025-05-20 DIAGNOSIS — C50.919 MALIGNANT NEOPLASM OF FEMALE BREAST, UNSPECIFIED ESTROGEN RECEPTOR STATUS, UNSPECIFIED LATERALITY, UNSPECIFIED SITE OF BREAST: Primary | ICD-10-CM

## 2025-05-20 LAB
ALBUMIN SERPL-MCNC: 4 G/DL (ref 3.5–5.2)
ALBUMIN/GLOB SERPL: 1.6 G/DL
ALP SERPL-CCNC: 95 U/L (ref 39–117)
ALT SERPL W P-5'-P-CCNC: 14 U/L (ref 1–33)
ANION GAP SERPL CALCULATED.3IONS-SCNC: 7.9 MMOL/L (ref 5–15)
AST SERPL-CCNC: 18 U/L (ref 1–32)
BASOPHILS # BLD AUTO: 0.04 10*3/MM3 (ref 0–0.2)
BASOPHILS NFR BLD AUTO: 0.6 % (ref 0–1.5)
BILIRUB SERPL-MCNC: 0.3 MG/DL (ref 0–1.2)
BUN SERPL-MCNC: 12 MG/DL (ref 8–23)
BUN/CREAT SERPL: 19.4 (ref 7–25)
CALCIUM SPEC-SCNC: 9.4 MG/DL (ref 8.6–10.5)
CHLORIDE SERPL-SCNC: 105 MMOL/L (ref 98–107)
CO2 SERPL-SCNC: 30.1 MMOL/L (ref 22–29)
CREAT SERPL-MCNC: 0.62 MG/DL (ref 0.57–1)
DEPRECATED RDW RBC AUTO: 50.2 FL (ref 37–54)
EGFRCR SERPLBLD CKD-EPI 2021: 100.2 ML/MIN/1.73
EOSINOPHIL # BLD AUTO: 0.4 10*3/MM3 (ref 0–0.4)
EOSINOPHIL NFR BLD AUTO: 6.4 % (ref 0.3–6.2)
ERYTHROCYTE [DISTWIDTH] IN BLOOD BY AUTOMATED COUNT: 14.7 % (ref 12.3–15.4)
GLOBULIN UR ELPH-MCNC: 2.5 GM/DL
GLUCOSE SERPL-MCNC: 116 MG/DL (ref 65–99)
HCT VFR BLD AUTO: 50.4 % (ref 34–46.6)
HGB BLD-MCNC: 16 G/DL (ref 12–15.9)
IMM GRANULOCYTES # BLD AUTO: 0.02 10*3/MM3 (ref 0–0.05)
IMM GRANULOCYTES NFR BLD AUTO: 0.3 % (ref 0–0.5)
LYMPHOCYTES # BLD AUTO: 1.52 10*3/MM3 (ref 0.7–3.1)
LYMPHOCYTES NFR BLD AUTO: 24.5 % (ref 19.6–45.3)
MCH RBC QN AUTO: 29.4 PG (ref 26.6–33)
MCHC RBC AUTO-ENTMCNC: 31.7 G/DL (ref 31.5–35.7)
MCV RBC AUTO: 92.6 FL (ref 79–97)
MONOCYTES # BLD AUTO: 0.51 10*3/MM3 (ref 0.1–0.9)
MONOCYTES NFR BLD AUTO: 8.2 % (ref 5–12)
NEUTROPHILS NFR BLD AUTO: 3.72 10*3/MM3 (ref 1.7–7)
NEUTROPHILS NFR BLD AUTO: 60 % (ref 42.7–76)
NRBC BLD AUTO-RTO: 0 /100 WBC (ref 0–0.2)
PLATELET # BLD AUTO: 275 10*3/MM3 (ref 140–450)
PMV BLD AUTO: 9.6 FL (ref 6–12)
POTASSIUM SERPL-SCNC: 4.3 MMOL/L (ref 3.5–5.2)
PROT SERPL-MCNC: 6.5 G/DL (ref 6–8.5)
RBC # BLD AUTO: 5.44 10*6/MM3 (ref 3.77–5.28)
SODIUM SERPL-SCNC: 143 MMOL/L (ref 136–145)
WBC NRBC COR # BLD AUTO: 6.21 10*3/MM3 (ref 3.4–10.8)

## 2025-05-20 PROCEDURE — 80053 COMPREHEN METABOLIC PANEL: CPT | Performed by: NURSE PRACTITIONER

## 2025-05-20 PROCEDURE — 85025 COMPLETE CBC W/AUTO DIFF WBC: CPT | Performed by: NURSE PRACTITIONER

## 2025-05-20 PROCEDURE — 36415 COLL VENOUS BLD VENIPUNCTURE: CPT

## 2025-07-03 ENCOUNTER — PATIENT OUTREACH (OUTPATIENT)
Dept: OTHER | Facility: HOSPITAL | Age: 65
End: 2025-07-03
Payer: COMMERCIAL

## 2025-07-03 NOTE — PROGRESS NOTES
Called Ms. Nath to see how she was doing. She stated finished radiation and is doing well. She stated she has no needs at this time. She was thankful for the call and will reach out if any questions or needs arise.     We briefly discussed survivorship and let her know a letter with the following information will be mailed to her along with [facility] survivorship booklet:    Regular Follow-Up Appointments: We recommend follow-up with your surgeon as directed  Mammograms: As directed by your provider  Ongoing Medications: If you are on hormone therapy or other medications, please continue as prescribed and let your medical oncologists know if you experience any side effects   Health Maintenance: We encourage a healthy diet, regular exercise, and emotional well-being    When to call:  Please contact us if you notice:  Any new or persistent symptoms  Lumps, swelling, or changes in your breast or chest area  Unexplained pain, fatigue or weight changes  Emotional challenges

## 2025-07-31 ENCOUNTER — TELEPHONE (OUTPATIENT)
Dept: SURGERY | Facility: CLINIC | Age: 65
End: 2025-07-31
Payer: COMMERCIAL

## 2025-07-31 NOTE — TELEPHONE ENCOUNTER
Attempted to contact patient to inform her 10/14 appointment has been reschedule to 10/7 at 10 am. Left voicemail. Advise to call back with any questions.

## 2025-08-04 ENCOUNTER — HOSPITAL ENCOUNTER (OUTPATIENT)
Dept: CT IMAGING | Facility: HOSPITAL | Age: 65
Discharge: HOME | End: 2025-08-04
Admitting: HOSPITALIST
Payer: COMMERCIAL

## 2025-08-04 DIAGNOSIS — R91.1 LUNG NODULE: ICD-10-CM

## 2025-08-04 PROCEDURE — 71250 CT THORAX DX C-: CPT

## 2025-08-21 ENCOUNTER — TRANSCRIBE ORDERS (OUTPATIENT)
Dept: CT IMAGING | Facility: HOSPITAL | Age: 65
End: 2025-08-21
Payer: COMMERCIAL

## 2025-08-21 DIAGNOSIS — R91.8 LUNG NODULES: Primary | ICD-10-CM

## 2025-08-28 ENCOUNTER — LAB (OUTPATIENT)
Dept: LAB | Facility: HOSPITAL | Age: 65
End: 2025-08-28
Payer: COMMERCIAL

## 2025-08-28 ENCOUNTER — OFFICE VISIT (OUTPATIENT)
Dept: ONCOLOGY | Facility: CLINIC | Age: 65
End: 2025-08-28
Payer: COMMERCIAL

## 2025-08-28 VITALS
BODY MASS INDEX: 29.59 KG/M2 | HEIGHT: 70 IN | RESPIRATION RATE: 16 BRPM | HEART RATE: 78 BPM | OXYGEN SATURATION: 93 % | TEMPERATURE: 97.5 F | WEIGHT: 206.7 LBS | SYSTOLIC BLOOD PRESSURE: 121 MMHG | DIASTOLIC BLOOD PRESSURE: 78 MMHG

## 2025-08-28 DIAGNOSIS — D58.2 ELEVATED HEMOGLOBIN: Primary | ICD-10-CM

## 2025-08-28 DIAGNOSIS — C50.919 MALIGNANT NEOPLASM OF FEMALE BREAST, UNSPECIFIED ESTROGEN RECEPTOR STATUS, UNSPECIFIED LATERALITY, UNSPECIFIED SITE OF BREAST: ICD-10-CM

## 2025-08-28 DIAGNOSIS — D58.2 ELEVATED HEMOGLOBIN: ICD-10-CM

## 2025-08-28 LAB
ALBUMIN SERPL-MCNC: 4 G/DL (ref 3.5–5.2)
ALBUMIN/GLOB SERPL: 1.7 G/DL
ALP SERPL-CCNC: 106 U/L (ref 39–117)
ALT SERPL W P-5'-P-CCNC: 16 U/L (ref 1–33)
ANION GAP SERPL CALCULATED.3IONS-SCNC: 9.2 MMOL/L (ref 5–15)
AST SERPL-CCNC: 18 U/L (ref 1–32)
BASOPHILS # BLD AUTO: 0.06 10*3/MM3 (ref 0–0.2)
BASOPHILS NFR BLD AUTO: 0.7 % (ref 0–1.5)
BILIRUB SERPL-MCNC: 0.5 MG/DL (ref 0–1.2)
BUN SERPL-MCNC: 12.1 MG/DL (ref 8–23)
BUN/CREAT SERPL: 18.9 (ref 7–25)
CALCIUM SPEC-SCNC: 9.2 MG/DL (ref 8.6–10.5)
CHLORIDE SERPL-SCNC: 102 MMOL/L (ref 98–107)
CO2 SERPL-SCNC: 27.8 MMOL/L (ref 22–29)
CREAT SERPL-MCNC: 0.64 MG/DL (ref 0.57–1)
DEPRECATED RDW RBC AUTO: 51.8 FL (ref 37–54)
EGFRCR SERPLBLD CKD-EPI 2021: 99.4 ML/MIN/1.73
EOSINOPHIL # BLD AUTO: 0.29 10*3/MM3 (ref 0–0.4)
EOSINOPHIL NFR BLD AUTO: 3.3 % (ref 0.3–6.2)
ERYTHROCYTE [DISTWIDTH] IN BLOOD BY AUTOMATED COUNT: 15.9 % (ref 12.3–15.4)
FERRITIN SERPL-MCNC: 49.3 NG/ML (ref 13–150)
GLOBULIN UR ELPH-MCNC: 2.3 GM/DL
GLUCOSE SERPL-MCNC: 149 MG/DL (ref 65–99)
HCT VFR BLD AUTO: 51.9 % (ref 34–46.6)
HGB BLD-MCNC: 16.7 G/DL (ref 12–15.9)
IMM GRANULOCYTES # BLD AUTO: 0.01 10*3/MM3 (ref 0–0.05)
IMM GRANULOCYTES NFR BLD AUTO: 0.1 % (ref 0–0.5)
IRON 24H UR-MRATE: 73 MCG/DL (ref 37–145)
IRON SATN MFR SERPL: 20 % (ref 20–50)
LYMPHOCYTES # BLD AUTO: 2.04 10*3/MM3 (ref 0.7–3.1)
LYMPHOCYTES NFR BLD AUTO: 23.3 % (ref 19.6–45.3)
MCH RBC QN AUTO: 29.1 PG (ref 26.6–33)
MCHC RBC AUTO-ENTMCNC: 32.2 G/DL (ref 31.5–35.7)
MCV RBC AUTO: 90.6 FL (ref 79–97)
MONOCYTES # BLD AUTO: 0.7 10*3/MM3 (ref 0.1–0.9)
MONOCYTES NFR BLD AUTO: 8 % (ref 5–12)
NEUTROPHILS NFR BLD AUTO: 5.64 10*3/MM3 (ref 1.7–7)
NEUTROPHILS NFR BLD AUTO: 64.6 % (ref 42.7–76)
NRBC BLD AUTO-RTO: 0 /100 WBC (ref 0–0.2)
PLATELET # BLD AUTO: 249 10*3/MM3 (ref 140–450)
PMV BLD AUTO: 9.9 FL (ref 6–12)
POTASSIUM SERPL-SCNC: 4.3 MMOL/L (ref 3.5–5.2)
PROT SERPL-MCNC: 6.3 G/DL (ref 6–8.5)
RBC # BLD AUTO: 5.73 10*6/MM3 (ref 3.77–5.28)
SODIUM SERPL-SCNC: 139 MMOL/L (ref 136–145)
TIBC SERPL-MCNC: 364 MCG/DL (ref 298–536)
TRANSFERRIN SERPL-MCNC: 244 MG/DL (ref 200–360)
WBC NRBC COR # BLD AUTO: 8.74 10*3/MM3 (ref 3.4–10.8)

## 2025-08-28 PROCEDURE — 82668 ASSAY OF ERYTHROPOIETIN: CPT | Performed by: NURSE PRACTITIONER

## 2025-08-28 PROCEDURE — 85025 COMPLETE CBC W/AUTO DIFF WBC: CPT | Performed by: INTERNAL MEDICINE

## 2025-08-28 PROCEDURE — 36415 COLL VENOUS BLD VENIPUNCTURE: CPT

## 2025-08-28 PROCEDURE — 80053 COMPREHEN METABOLIC PANEL: CPT | Performed by: INTERNAL MEDICINE

## 2025-08-28 PROCEDURE — 83540 ASSAY OF IRON: CPT | Performed by: INTERNAL MEDICINE

## 2025-08-28 PROCEDURE — 84466 ASSAY OF TRANSFERRIN: CPT | Performed by: INTERNAL MEDICINE

## 2025-08-28 PROCEDURE — 82728 ASSAY OF FERRITIN: CPT | Performed by: INTERNAL MEDICINE

## 2025-08-29 LAB — EPO SERPL-ACNC: 20.7 MIU/ML (ref 2.6–18.5)

## (undated) DEVICE — Device

## (undated) DEVICE — SYR LL TP 10ML STRL

## (undated) DEVICE — SHEATH GUIDE SCOUT SURG TPR 8.3TO3.2CM 264CM STRL

## (undated) DEVICE — CVR TRANSD CIV FLX TPR 11.9 TO 3.8X61CM

## (undated) DEVICE — PROXIMATE RH ROTATING HEAD SKIN STAPLERS (35 WIDE) CONTAINS 35 STAINLESS STEEL STAPLES: Brand: PROXIMATE

## (undated) DEVICE — PK UNIV COMPL 40

## (undated) DEVICE — GLV SURG SENSICARE PI MIC PF SZ6.5 LF STRL

## (undated) DEVICE — GLV SURG BIOGEL LTX PF 6 1/2

## (undated) DEVICE — TRAP FLD MINIVAC MEGADYNE 100ML

## (undated) DEVICE — APPL CHLORAPREP HI/LITE 26ML ORNG

## (undated) DEVICE — JACKSON-PRATT 100CC BULB RESERVOIR: Brand: CARDINAL HEALTH

## (undated) DEVICE — SYR LUERLOK 5CC

## (undated) DEVICE — BIOPATCH™ ANTIMICROBIAL DRESSING WITH CHLORHEXIDINE GLUCONATE IS A HYDROPHILLIC POLYURETHANE ABSORPTIVE FOAM WITH CHLORHEXIDINE GLUCONATE (CHG) WHICH INHIBITS BACTERIAL GROWTH UNDER THE DRESSING. THE DRESSING IS INTENDED TO BE USED TO ABSORB EXUDATE, COVER A WOUND CAUSED BY VASCULAR AND NONVASCULAR PERCUTANEOUS MEDICAL DEVICES DURING SURGERY, AS WELL AS REDUCE LOCAL INFECTION AND COLONIZATION OF MICROORGANISMS.: Brand: BIOPATCH

## (undated) DEVICE — EXTRCT STPL SKIN STRL BX/12

## (undated) DEVICE — SPONGE,LAP,18"X18",DLX,XR,ST,5/PK,40/PK: Brand: MEDLINE

## (undated) DEVICE — STPLR SKIN VISISTAT WD 35CT

## (undated) DEVICE — NDL HYPO PRECISIONGLIDE REG 25G 1 1/2

## (undated) DEVICE — SUT MNCRYL 3/0 PS2 18IN MCP497G

## (undated) DEVICE — LEGGINGS, PAIR, 31X48, STERILE: Brand: MEDLINE

## (undated) DEVICE — LAG MINOR PROCEDURE: Brand: MEDLINE INDUSTRIES, INC.

## (undated) DEVICE — CONTAINER,SPECIMEN,OR STERILE,4OZ: Brand: MEDLINE

## (undated) DEVICE — PENCL SMOKE/EVAC MEGADYNE TELESCP 10FT

## (undated) DEVICE — HYPODERMIC SAFETY NEEDLE: Brand: MONOJECT

## (undated) DEVICE — ELECTRD BLD EZ CLN MOD XLNG 2.75IN

## (undated) DEVICE — SUT MNCRYL PLS ANTIB UD 4/0 PS2 18IN

## (undated) DEVICE — STCKNT IMPERV 9X36IN STRL

## (undated) DEVICE — SUT SILK 2/0 FS BLK 18IN 685G

## (undated) DEVICE — BNDG,ELSTC,MATRIX,STRL,6"X5YD,LF,HOOK&LP: Brand: MEDLINE

## (undated) DEVICE — MARKER,SKIN,WI/RULER AND LABELS: Brand: MEDLINE

## (undated) DEVICE — SUT ETHLN 4/0 PS2 PLSTC 1667G